# Patient Record
Sex: FEMALE | Race: WHITE | Employment: OTHER | URBAN - METROPOLITAN AREA
[De-identification: names, ages, dates, MRNs, and addresses within clinical notes are randomized per-mention and may not be internally consistent; named-entity substitution may affect disease eponyms.]

---

## 2017-03-31 ENCOUNTER — OFFICE VISIT (OUTPATIENT)
Dept: INTERNAL MEDICINE CLINIC | Age: 72
End: 2017-03-31
Payer: MEDICARE

## 2017-03-31 VITALS
BODY MASS INDEX: 27.75 KG/M2 | WEIGHT: 147 LBS | SYSTOLIC BLOOD PRESSURE: 138 MMHG | DIASTOLIC BLOOD PRESSURE: 88 MMHG | HEIGHT: 61 IN

## 2017-03-31 DIAGNOSIS — Z12.31 ENCOUNTER FOR SCREENING MAMMOGRAM FOR BREAST CANCER: ICD-10-CM

## 2017-03-31 DIAGNOSIS — I21.4 NSTEMI (NON-ST ELEVATED MYOCARDIAL INFARCTION) (HCC): ICD-10-CM

## 2017-03-31 DIAGNOSIS — M81.0 OSTEOPOROSIS: ICD-10-CM

## 2017-03-31 DIAGNOSIS — E78.2 MIXED HYPERLIPIDEMIA: ICD-10-CM

## 2017-03-31 DIAGNOSIS — Z12.11 SCREENING FOR COLON CANCER: ICD-10-CM

## 2017-03-31 DIAGNOSIS — I10 ESSENTIAL HYPERTENSION: Primary | ICD-10-CM

## 2017-03-31 DIAGNOSIS — D12.6 TUBULOVILLOUS ADENOMA OF COLON: ICD-10-CM

## 2017-03-31 PROCEDURE — 99214 OFFICE O/P EST MOD 30 MIN: CPT | Performed by: INTERNAL MEDICINE

## 2017-03-31 RX ORDER — ATORVASTATIN CALCIUM 40 MG/1
40 TABLET, FILM COATED ORAL DAILY
Qty: 90 TABLET | Refills: 0 | Status: SHIPPED | OUTPATIENT
Start: 2017-03-31 | End: 2017-03-31 | Stop reason: CLARIF

## 2017-03-31 RX ORDER — CARVEDILOL 6.25 MG/1
12.5 TABLET ORAL 2 TIMES DAILY
Qty: 180 TABLET | Refills: 3 | Status: SHIPPED | OUTPATIENT
Start: 2017-03-31 | End: 2017-05-30 | Stop reason: SDUPTHER

## 2017-03-31 RX ORDER — ATORVASTATIN CALCIUM 40 MG/1
40 TABLET, FILM COATED ORAL DAILY
Qty: 90 TABLET | Refills: 0 | Status: SHIPPED | OUTPATIENT
Start: 2017-03-31 | End: 2017-03-31 | Stop reason: SDUPTHER

## 2017-03-31 ASSESSMENT — ENCOUNTER SYMPTOMS
RHINORRHEA: 0
FACIAL SWELLING: 0
CHOKING: 0
EYE ITCHING: 0
DIARRHEA: 0
ANAL BLEEDING: 0
SORE THROAT: 0
BACK PAIN: 1
EYE REDNESS: 0
ABDOMINAL PAIN: 0
VOICE CHANGE: 0
CHEST TIGHTNESS: 0
ABDOMINAL DISTENTION: 0
COLOR CHANGE: 0
CONSTIPATION: 0
NAUSEA: 0
VOMITING: 0
SHORTNESS OF BREATH: 0
BLOOD IN STOOL: 0
PHOTOPHOBIA: 0
TROUBLE SWALLOWING: 0
COUGH: 0
RECTAL PAIN: 0
APNEA: 0
EYE DISCHARGE: 0
EYE PAIN: 0
WHEEZING: 0
STRIDOR: 0
SINUS PRESSURE: 0

## 2017-03-31 ASSESSMENT — PATIENT HEALTH QUESTIONNAIRE - PHQ9
SUM OF ALL RESPONSES TO PHQ QUESTIONS 1-9: 0
1. LITTLE INTEREST OR PLEASURE IN DOING THINGS: 0
2. FEELING DOWN, DEPRESSED OR HOPELESS: 0
SUM OF ALL RESPONSES TO PHQ9 QUESTIONS 1 & 2: 0

## 2017-04-03 RX ORDER — CARVEDILOL 12.5 MG/1
12.5 TABLET ORAL 2 TIMES DAILY
Qty: 180 TABLET | Refills: 3 | Status: SHIPPED | OUTPATIENT
Start: 2017-04-03 | End: 2018-03-22 | Stop reason: SDUPTHER

## 2017-04-05 ENCOUNTER — TELEPHONE (OUTPATIENT)
Dept: GASTROENTEROLOGY | Age: 72
End: 2017-04-05

## 2017-04-06 ENCOUNTER — TELEPHONE (OUTPATIENT)
Dept: GASTROENTEROLOGY | Age: 72
End: 2017-04-06

## 2017-04-06 DIAGNOSIS — Z86.010 HX OF COLONIC POLYPS: Primary | ICD-10-CM

## 2017-04-22 ENCOUNTER — HOSPITAL ENCOUNTER (OUTPATIENT)
Age: 72
Setting detail: OUTPATIENT SURGERY
Discharge: HOME | End: 2017-04-22
Attending: INTERNAL MEDICINE | Admitting: INTERNAL MEDICINE
Payer: MEDICARE

## 2017-04-22 VITALS
OXYGEN SATURATION: 99 % | WEIGHT: 145 LBS | RESPIRATION RATE: 16 BRPM | SYSTOLIC BLOOD PRESSURE: 113 MMHG | DIASTOLIC BLOOD PRESSURE: 62 MMHG | TEMPERATURE: 97.5 F | BODY MASS INDEX: 27.38 KG/M2 | HEART RATE: 56 BPM | HEIGHT: 61 IN

## 2017-04-22 PROCEDURE — 7100000011 HC PHASE II RECOVERY - ADDTL 15 MIN: Performed by: INTERNAL MEDICINE

## 2017-04-22 PROCEDURE — 2580000003 HC RX 258: Performed by: INTERNAL MEDICINE

## 2017-04-22 PROCEDURE — 99153 MOD SED SAME PHYS/QHP EA: CPT | Performed by: INTERNAL MEDICINE

## 2017-04-22 PROCEDURE — 7100000010 HC PHASE II RECOVERY - FIRST 15 MIN: Performed by: INTERNAL MEDICINE

## 2017-04-22 PROCEDURE — 3609010400 HC COLONOSCOPY POLYPECTOMY HOT BIOPSY: Performed by: INTERNAL MEDICINE

## 2017-04-22 PROCEDURE — 99152 MOD SED SAME PHYS/QHP 5/>YRS: CPT | Performed by: INTERNAL MEDICINE

## 2017-04-22 PROCEDURE — 88305 TISSUE EXAM BY PATHOLOGIST: CPT

## 2017-04-22 PROCEDURE — 6360000002 HC RX W HCPCS: Performed by: INTERNAL MEDICINE

## 2017-04-22 RX ORDER — FENTANYL CITRATE 50 UG/ML
INJECTION, SOLUTION INTRAMUSCULAR; INTRAVENOUS PRN
Status: DISCONTINUED | OUTPATIENT
Start: 2017-04-22 | End: 2017-04-22 | Stop reason: HOSPADM

## 2017-04-22 RX ORDER — MIDAZOLAM HYDROCHLORIDE 1 MG/ML
INJECTION INTRAMUSCULAR; INTRAVENOUS PRN
Status: DISCONTINUED | OUTPATIENT
Start: 2017-04-22 | End: 2017-04-22 | Stop reason: HOSPADM

## 2017-04-22 RX ORDER — SODIUM CHLORIDE, SODIUM LACTATE, POTASSIUM CHLORIDE, CALCIUM CHLORIDE 600; 310; 30; 20 MG/100ML; MG/100ML; MG/100ML; MG/100ML
INJECTION, SOLUTION INTRAVENOUS CONTINUOUS
Status: DISCONTINUED | OUTPATIENT
Start: 2017-04-22 | End: 2017-04-22 | Stop reason: HOSPADM

## 2017-04-22 RX ADMIN — SODIUM CHLORIDE, POTASSIUM CHLORIDE, SODIUM LACTATE AND CALCIUM CHLORIDE: 600; 310; 30; 20 INJECTION, SOLUTION INTRAVENOUS at 07:05

## 2017-04-22 ASSESSMENT — PAIN SCALES - GENERAL
PAINLEVEL_OUTOF10: 0
PAINLEVEL_OUTOF10: 0

## 2017-04-22 ASSESSMENT — PAIN - FUNCTIONAL ASSESSMENT: PAIN_FUNCTIONAL_ASSESSMENT: 0-10

## 2017-04-27 DIAGNOSIS — M81.0 OSTEOPOROSIS: ICD-10-CM

## 2017-04-27 DIAGNOSIS — Z12.31 ENCOUNTER FOR SCREENING MAMMOGRAM FOR BREAST CANCER: ICD-10-CM

## 2017-04-27 LAB — SURGICAL PATHOLOGY REPORT: NORMAL

## 2017-05-15 PROBLEM — Z86.010 HISTORY OF COLON POLYPS: Status: ACTIVE | Noted: 2017-04-22

## 2017-05-15 PROBLEM — Z86.0100 HISTORY OF COLON POLYPS: Status: ACTIVE | Noted: 2017-04-22

## 2017-05-30 ENCOUNTER — OFFICE VISIT (OUTPATIENT)
Dept: GASTROENTEROLOGY | Age: 72
End: 2017-05-30
Payer: MEDICARE

## 2017-05-30 VITALS
HEIGHT: 61 IN | OXYGEN SATURATION: 96 % | DIASTOLIC BLOOD PRESSURE: 77 MMHG | TEMPERATURE: 97.7 F | SYSTOLIC BLOOD PRESSURE: 132 MMHG | HEART RATE: 67 BPM | WEIGHT: 143.1 LBS | BODY MASS INDEX: 27.02 KG/M2

## 2017-05-30 DIAGNOSIS — Z86.010 HISTORY OF COLON POLYPS: Primary | ICD-10-CM

## 2017-05-30 DIAGNOSIS — K60.2 ANAL FISSURE: ICD-10-CM

## 2017-05-30 PROCEDURE — 99214 OFFICE O/P EST MOD 30 MIN: CPT | Performed by: INTERNAL MEDICINE

## 2017-05-30 RX ORDER — ATORVASTATIN CALCIUM 40 MG/1
20 TABLET, FILM COATED ORAL DAILY
COMMUNITY
Start: 2017-03-31 | End: 2017-07-28 | Stop reason: DRUGHIGH

## 2017-05-30 ASSESSMENT — ENCOUNTER SYMPTOMS
BACK PAIN: 1
RECTAL PAIN: 0
ABDOMINAL PAIN: 0
TROUBLE SWALLOWING: 0
CHOKING: 0
CONSTIPATION: 0
VOICE CHANGE: 0
ALLERGIC/IMMUNOLOGIC NEGATIVE: 1
ANAL BLEEDING: 0
ABDOMINAL DISTENTION: 0
SINUS PRESSURE: 0
DIARRHEA: 0
GASTROINTESTINAL NEGATIVE: 1
NAUSEA: 0
BLOOD IN STOOL: 0
FACIAL SWELLING: 0
SHORTNESS OF BREATH: 0
RHINORRHEA: 0
VOMITING: 0
SORE THROAT: 0
COUGH: 0

## 2017-06-02 ENCOUNTER — OFFICE VISIT (OUTPATIENT)
Dept: INTERNAL MEDICINE CLINIC | Age: 72
End: 2017-06-02
Payer: MEDICARE

## 2017-06-02 ENCOUNTER — TELEPHONE (OUTPATIENT)
Dept: INTERNAL MEDICINE CLINIC | Age: 72
End: 2017-06-02

## 2017-06-02 VITALS
HEIGHT: 61 IN | DIASTOLIC BLOOD PRESSURE: 80 MMHG | WEIGHT: 144 LBS | SYSTOLIC BLOOD PRESSURE: 122 MMHG | BODY MASS INDEX: 27.19 KG/M2

## 2017-06-02 DIAGNOSIS — E78.2 MIXED HYPERLIPIDEMIA: ICD-10-CM

## 2017-06-02 DIAGNOSIS — M81.0 OSTEOPOROSIS: ICD-10-CM

## 2017-06-02 DIAGNOSIS — I25.10 CORONARY ARTERY DISEASE INVOLVING NATIVE CORONARY ARTERY OF NATIVE HEART WITHOUT ANGINA PECTORIS: ICD-10-CM

## 2017-06-02 DIAGNOSIS — I10 ESSENTIAL HYPERTENSION: ICD-10-CM

## 2017-06-02 DIAGNOSIS — D12.6 TUBULOVILLOUS ADENOMA OF COLON: Primary | ICD-10-CM

## 2017-06-02 DIAGNOSIS — L40.9 PSORIASIS: ICD-10-CM

## 2017-06-02 PROCEDURE — 99214 OFFICE O/P EST MOD 30 MIN: CPT | Performed by: INTERNAL MEDICINE

## 2017-06-02 ASSESSMENT — ENCOUNTER SYMPTOMS
CHEST TIGHTNESS: 0
CHOKING: 0
ANAL BLEEDING: 0
TROUBLE SWALLOWING: 0
COLOR CHANGE: 0
EYE REDNESS: 0
EYE PAIN: 0
DIARRHEA: 0
APNEA: 0
FACIAL SWELLING: 0
BLOOD IN STOOL: 0
EYE ITCHING: 0
CONSTIPATION: 0
ABDOMINAL PAIN: 0
EYE DISCHARGE: 0
STRIDOR: 0
NAUSEA: 0
SINUS PRESSURE: 0
SORE THROAT: 0
ABDOMINAL DISTENTION: 0
VOICE CHANGE: 0
PHOTOPHOBIA: 0
RECTAL PAIN: 0
VOMITING: 0
SHORTNESS OF BREATH: 0
WHEEZING: 0
COUGH: 0
BACK PAIN: 0
RHINORRHEA: 0

## 2017-07-19 ENCOUNTER — HOSPITAL ENCOUNTER (OUTPATIENT)
Dept: CARDIAC CATH/INVASIVE PROCEDURES | Age: 72
Discharge: HOME | End: 2017-07-20
Attending: INTERNAL MEDICINE | Admitting: INTERNAL MEDICINE
Payer: MEDICARE

## 2017-07-19 LAB
GFR NON-AFRICAN AMERICAN: >60 ML/MIN
GFR SERPL CREATININE-BSD FRML MDRD: >60 ML/MIN
GFR SERPL CREATININE-BSD FRML MDRD: ABNORMAL ML/MIN/{1.73_M2}
GLUCOSE BLD-MCNC: 97 MG/DL (ref 74–100)
PLATELET # BLD: 219 K/UL (ref 140–450)
POC CHLORIDE: 98 MMOL/L (ref 98–107)
POC CREATININE: 0.47 MG/DL (ref 0.51–1.19)
POC HEMATOCRIT: 40 % (ref 36–46)
POC HEMOGLOBIN: 13.6 G/DL (ref 12–16)
POC POTASSIUM: 3.7 MMOL/L (ref 3.5–4.5)
POC SODIUM: 134 MMOL/L (ref 138–146)
TROPONIN INTERP: NORMAL
TROPONIN INTERP: NORMAL
TROPONIN T: <0.03 NG/ML
TROPONIN T: <0.03 NG/ML

## 2017-07-19 PROCEDURE — C1769 GUIDE WIRE: HCPCS

## 2017-07-19 PROCEDURE — C1725 CATH, TRANSLUMIN NON-LASER: HCPCS

## 2017-07-19 PROCEDURE — 82565 ASSAY OF CREATININE: CPT

## 2017-07-19 PROCEDURE — 36415 COLL VENOUS BLD VENIPUNCTURE: CPT

## 2017-07-19 PROCEDURE — 7100000010 HC PHASE II RECOVERY - FIRST 15 MIN

## 2017-07-19 PROCEDURE — 93455 CORONARY ART/GRFT ANGIO S&I: CPT | Performed by: INTERNAL MEDICINE

## 2017-07-19 PROCEDURE — 7100000011 HC PHASE II RECOVERY - ADDTL 15 MIN

## 2017-07-19 PROCEDURE — 85049 AUTOMATED PLATELET COUNT: CPT

## 2017-07-19 PROCEDURE — 85014 HEMATOCRIT: CPT

## 2017-07-19 PROCEDURE — 6360000002 HC RX W HCPCS

## 2017-07-19 PROCEDURE — 92928 PRQ TCAT PLMT NTRAC ST 1 LES: CPT | Performed by: INTERNAL MEDICINE

## 2017-07-19 PROCEDURE — 2580000003 HC RX 258

## 2017-07-19 PROCEDURE — C1894 INTRO/SHEATH, NON-LASER: HCPCS

## 2017-07-19 PROCEDURE — C1887 CATHETER, GUIDING: HCPCS

## 2017-07-19 PROCEDURE — 84132 ASSAY OF SERUM POTASSIUM: CPT

## 2017-07-19 PROCEDURE — 1200000000 HC SEMI PRIVATE

## 2017-07-19 PROCEDURE — 84484 ASSAY OF TROPONIN QUANT: CPT

## 2017-07-19 PROCEDURE — 6370000000 HC RX 637 (ALT 250 FOR IP)

## 2017-07-19 PROCEDURE — 2580000003 HC RX 258: Performed by: INTERNAL MEDICINE

## 2017-07-19 PROCEDURE — 2500000003 HC RX 250 WO HCPCS

## 2017-07-19 PROCEDURE — C1874 STENT, COATED/COV W/DEL SYS: HCPCS

## 2017-07-19 PROCEDURE — 82947 ASSAY GLUCOSE BLOOD QUANT: CPT

## 2017-07-19 PROCEDURE — 6370000000 HC RX 637 (ALT 250 FOR IP): Performed by: INTERNAL MEDICINE

## 2017-07-19 PROCEDURE — 82435 ASSAY OF BLOOD CHLORIDE: CPT

## 2017-07-19 PROCEDURE — 84295 ASSAY OF SERUM SODIUM: CPT

## 2017-07-19 RX ORDER — SODIUM CHLORIDE 9 MG/ML
INJECTION, SOLUTION INTRAVENOUS CONTINUOUS
Status: DISCONTINUED | OUTPATIENT
Start: 2017-07-19 | End: 2017-07-19

## 2017-07-19 RX ORDER — SODIUM CHLORIDE 0.9 % (FLUSH) 0.9 %
10 SYRINGE (ML) INJECTION EVERY 12 HOURS SCHEDULED
Status: DISCONTINUED | OUTPATIENT
Start: 2017-07-19 | End: 2017-07-20 | Stop reason: HOSPADM

## 2017-07-19 RX ORDER — HYDROCHLOROTHIAZIDE 25 MG/1
25 TABLET ORAL DAILY
Status: DISCONTINUED | OUTPATIENT
Start: 2017-07-19 | End: 2017-07-20 | Stop reason: HOSPADM

## 2017-07-19 RX ORDER — SODIUM CHLORIDE 9 MG/ML
INJECTION, SOLUTION INTRAVENOUS CONTINUOUS
Status: DISCONTINUED | OUTPATIENT
Start: 2017-07-19 | End: 2017-07-20 | Stop reason: HOSPADM

## 2017-07-19 RX ORDER — ATORVASTATIN CALCIUM 40 MG/1
40 TABLET, FILM COATED ORAL DAILY
Status: DISCONTINUED | OUTPATIENT
Start: 2017-07-19 | End: 2017-07-20 | Stop reason: HOSPADM

## 2017-07-19 RX ORDER — ACETAMINOPHEN 325 MG/1
650 TABLET ORAL EVERY 4 HOURS PRN
Status: DISCONTINUED | OUTPATIENT
Start: 2017-07-19 | End: 2017-07-20 | Stop reason: HOSPADM

## 2017-07-19 RX ORDER — MELATONIN
1000 DAILY
Status: DISCONTINUED | OUTPATIENT
Start: 2017-07-19 | End: 2017-07-20 | Stop reason: HOSPADM

## 2017-07-19 RX ORDER — LABETALOL HYDROCHLORIDE 5 MG/ML
10 INJECTION, SOLUTION INTRAVENOUS EVERY 30 MIN PRN
Status: DISCONTINUED | OUTPATIENT
Start: 2017-07-19 | End: 2017-07-20 | Stop reason: HOSPADM

## 2017-07-19 RX ORDER — SODIUM CHLORIDE 0.9 % (FLUSH) 0.9 %
10 SYRINGE (ML) INJECTION PRN
Status: DISCONTINUED | OUTPATIENT
Start: 2017-07-19 | End: 2017-07-20 | Stop reason: HOSPADM

## 2017-07-19 RX ORDER — ONDANSETRON 2 MG/ML
4 INJECTION INTRAMUSCULAR; INTRAVENOUS EVERY 6 HOURS PRN
Status: DISCONTINUED | OUTPATIENT
Start: 2017-07-19 | End: 2017-07-20 | Stop reason: HOSPADM

## 2017-07-19 RX ORDER — ASPIRIN 81 MG/1
81 TABLET ORAL DAILY
Status: DISCONTINUED | OUTPATIENT
Start: 2017-07-19 | End: 2017-07-20 | Stop reason: HOSPADM

## 2017-07-19 RX ORDER — CARVEDILOL 12.5 MG/1
12.5 TABLET ORAL 2 TIMES DAILY
Status: DISCONTINUED | OUTPATIENT
Start: 2017-07-19 | End: 2017-07-20 | Stop reason: HOSPADM

## 2017-07-19 RX ADMIN — Medication 10 ML: at 21:28

## 2017-07-19 RX ADMIN — CARVEDILOL 12.5 MG: 12.5 TABLET, FILM COATED ORAL at 23:43

## 2017-07-19 RX ADMIN — SODIUM CHLORIDE: 9 INJECTION, SOLUTION INTRAVENOUS at 12:55

## 2017-07-19 RX ADMIN — ATORVASTATIN CALCIUM 40 MG: 40 TABLET, FILM COATED ORAL at 23:43

## 2017-07-19 RX ADMIN — SODIUM CHLORIDE: 9 INJECTION, SOLUTION INTRAVENOUS at 21:29

## 2017-07-20 VITALS
OXYGEN SATURATION: 100 % | RESPIRATION RATE: 20 BRPM | HEART RATE: 67 BPM | DIASTOLIC BLOOD PRESSURE: 51 MMHG | SYSTOLIC BLOOD PRESSURE: 113 MMHG | WEIGHT: 141 LBS | TEMPERATURE: 97.9 F | HEIGHT: 61 IN | BODY MASS INDEX: 26.62 KG/M2

## 2017-07-20 PROCEDURE — 6370000000 HC RX 637 (ALT 250 FOR IP): Performed by: INTERNAL MEDICINE

## 2017-07-20 PROCEDURE — 2580000003 HC RX 258: Performed by: INTERNAL MEDICINE

## 2017-07-20 RX ORDER — NITROGLYCERIN 0.4 MG/1
0.4 TABLET SUBLINGUAL EVERY 5 MIN PRN
Qty: 25 TABLET | Refills: 3 | Status: SHIPPED | OUTPATIENT
Start: 2017-07-20 | End: 2018-07-03 | Stop reason: SDUPTHER

## 2017-07-20 RX ADMIN — HYDROCHLOROTHIAZIDE 25 MG: 25 TABLET ORAL at 08:21

## 2017-07-20 RX ADMIN — CARVEDILOL 12.5 MG: 12.5 TABLET, FILM COATED ORAL at 08:23

## 2017-07-20 RX ADMIN — Medication 10 ML: at 08:21

## 2017-07-20 RX ADMIN — TICAGRELOR 90 MG: 90 TABLET ORAL at 08:21

## 2017-07-20 RX ADMIN — ASPIRIN 81 MG: 81 TABLET, COATED ORAL at 08:21

## 2017-07-20 RX ADMIN — VITAMIN D, TAB 1000IU (100/BT) 1000 UNITS: 25 TAB at 08:21

## 2017-07-20 ASSESSMENT — PAIN SCALES - GENERAL: PAINLEVEL_OUTOF10: 0

## 2017-07-21 ENCOUNTER — HOSPITAL ENCOUNTER (OUTPATIENT)
Age: 72
Setting detail: OBSERVATION
Discharge: HOME | End: 2017-07-22
Attending: EMERGENCY MEDICINE | Admitting: INTERNAL MEDICINE
Payer: MEDICARE

## 2017-07-21 ENCOUNTER — APPOINTMENT (OUTPATIENT)
Dept: GENERAL RADIOLOGY | Age: 72
End: 2017-07-21
Payer: MEDICARE

## 2017-07-21 DIAGNOSIS — R55 SYNCOPE AND COLLAPSE: Primary | ICD-10-CM

## 2017-07-21 DIAGNOSIS — Z98.890 HISTORY OF LEFT HEART CATHETERIZATION: ICD-10-CM

## 2017-07-21 PROBLEM — I25.10 CAD (CORONARY ARTERY DISEASE): Status: ACTIVE | Noted: 2017-07-21

## 2017-07-21 PROBLEM — Z95.5 STENTED CORONARY ARTERY: Status: ACTIVE | Noted: 2017-07-21

## 2017-07-21 LAB
-: ABNORMAL
ABSOLUTE EOS #: 0.1 K/UL (ref 0–0.4)
ABSOLUTE LYMPH #: 2 K/UL (ref 1–4.8)
ABSOLUTE MONO #: 0.7 K/UL (ref 0.1–1.3)
ALBUMIN SERPL-MCNC: 4.3 G/DL (ref 3.5–5.2)
ALBUMIN/GLOBULIN RATIO: NORMAL (ref 1–2.5)
ALP BLD-CCNC: 81 U/L (ref 35–104)
ALT SERPL-CCNC: 14 U/L (ref 5–33)
AMORPHOUS: ABNORMAL
ANION GAP SERPL CALCULATED.3IONS-SCNC: 16 MMOL/L (ref 9–17)
ANION GAP SERPL CALCULATED.3IONS-SCNC: 18 MMOL/L (ref 9–17)
AST SERPL-CCNC: 18 U/L
BACTERIA: ABNORMAL
BASOPHILS # BLD: 0 %
BASOPHILS ABSOLUTE: 0 K/UL (ref 0–0.2)
BILIRUB SERPL-MCNC: 0.65 MG/DL (ref 0.3–1.2)
BILIRUBIN DIRECT: 0.13 MG/DL
BILIRUBIN URINE: NEGATIVE
BILIRUBIN, INDIRECT: 0.52 MG/DL (ref 0–1)
BUN BLDV-MCNC: 12 MG/DL (ref 8–23)
BUN BLDV-MCNC: 14 MG/DL (ref 8–23)
BUN/CREAT BLD: ABNORMAL (ref 9–20)
BUN/CREAT BLD: ABNORMAL (ref 9–20)
CALCIUM SERPL-MCNC: 9.5 MG/DL (ref 8.6–10.4)
CALCIUM SERPL-MCNC: 9.6 MG/DL (ref 8.6–10.4)
CASTS UA: ABNORMAL /LPF
CHLORIDE BLD-SCNC: 94 MMOL/L (ref 98–107)
CHLORIDE BLD-SCNC: 97 MMOL/L (ref 98–107)
CO2: 20 MMOL/L (ref 20–31)
CO2: 22 MMOL/L (ref 20–31)
COLOR: YELLOW
COMMENT UA: ABNORMAL
CREAT SERPL-MCNC: 0.55 MG/DL (ref 0.5–0.9)
CREAT SERPL-MCNC: 0.59 MG/DL (ref 0.5–0.9)
CRYSTALS, UA: ABNORMAL /HPF
DIFFERENTIAL TYPE: NORMAL
EKG ATRIAL RATE: 65 BPM
EKG P AXIS: 59 DEGREES
EKG P-R INTERVAL: 226 MS
EKG Q-T INTERVAL: 390 MS
EKG QRS DURATION: 94 MS
EKG QTC CALCULATION (BAZETT): 405 MS
EKG R AXIS: -15 DEGREES
EKG T AXIS: 100 DEGREES
EKG VENTRICULAR RATE: 65 BPM
EOSINOPHILS RELATIVE PERCENT: 1 %
EPITHELIAL CELLS UA: ABNORMAL /HPF
GFR AFRICAN AMERICAN: >60 ML/MIN
GFR AFRICAN AMERICAN: >60 ML/MIN
GFR NON-AFRICAN AMERICAN: >60 ML/MIN
GFR NON-AFRICAN AMERICAN: >60 ML/MIN
GFR SERPL CREATININE-BSD FRML MDRD: ABNORMAL ML/MIN/{1.73_M2}
GLOBULIN: NORMAL G/DL (ref 1.5–3.8)
GLUCOSE BLD-MCNC: 108 MG/DL (ref 70–99)
GLUCOSE BLD-MCNC: 114 MG/DL (ref 70–99)
GLUCOSE URINE: NEGATIVE
HCT VFR BLD CALC: 37.3 % (ref 36–46)
HCT VFR BLD CALC: 38 % (ref 36–46)
HCT VFR BLD CALC: 38.6 % (ref 36–46)
HCT VFR BLD CALC: 40.2 % (ref 36–46)
HEMOGLOBIN: 12.8 G/DL (ref 12–16)
HEMOGLOBIN: 12.9 G/DL (ref 12–16)
HEMOGLOBIN: 13.2 G/DL (ref 12–16)
HEMOGLOBIN: 13.7 G/DL (ref 12–16)
KETONES, URINE: NEGATIVE
LEUKOCYTE ESTERASE, URINE: ABNORMAL
LV EF: 55 %
LVEF MODALITY: NORMAL
LYMPHOCYTES # BLD: 19 %
MAGNESIUM: 2 MG/DL (ref 1.6–2.6)
MCH RBC QN AUTO: 28.4 PG (ref 26–34)
MCH RBC QN AUTO: 28.4 PG (ref 26–34)
MCHC RBC AUTO-ENTMCNC: 34.1 G/DL (ref 31–37)
MCHC RBC AUTO-ENTMCNC: 34.1 G/DL (ref 31–37)
MCV RBC AUTO: 83.2 FL (ref 80–100)
MCV RBC AUTO: 83.4 FL (ref 80–100)
MONOCYTES # BLD: 7 %
MUCUS: ABNORMAL
MYOGLOBIN: 30 NG/ML (ref 25–58)
MYOGLOBIN: 34 NG/ML (ref 25–58)
NITRITE, URINE: NEGATIVE
OTHER OBSERVATIONS UA: ABNORMAL
PDW BLD-RTO: 13.3 % (ref 11.5–14.9)
PDW BLD-RTO: 13.6 % (ref 11.5–14.9)
PH UA: 6.5 (ref 5–8)
PHOSPHORUS: 4.4 MG/DL (ref 2.6–4.5)
PLATELET # BLD: 201 K/UL (ref 150–450)
PLATELET # BLD: 213 K/UL (ref 150–450)
PLATELET ESTIMATE: NORMAL
PMV BLD AUTO: 7.3 FL (ref 6–12)
PMV BLD AUTO: 7.7 FL (ref 6–12)
POTASSIUM SERPL-SCNC: 4.3 MMOL/L (ref 3.7–5.3)
POTASSIUM SERPL-SCNC: 4.5 MMOL/L (ref 3.7–5.3)
PROTEIN UA: NEGATIVE
RBC # BLD: 4.64 M/UL (ref 4–5.2)
RBC # BLD: 4.83 M/UL (ref 4–5.2)
RBC # BLD: NORMAL 10*6/UL
RBC UA: ABNORMAL /HPF
RENAL EPITHELIAL, UA: ABNORMAL /HPF
SEG NEUTROPHILS: 73 %
SEGMENTED NEUTROPHILS ABSOLUTE COUNT: 7.6 K/UL (ref 1.3–9.1)
SODIUM BLD-SCNC: 132 MMOL/L (ref 135–144)
SODIUM BLD-SCNC: 135 MMOL/L (ref 135–144)
SPECIFIC GRAVITY UA: 1.01 (ref 1–1.03)
TOTAL PROTEIN: 7 G/DL (ref 6.4–8.3)
TRICHOMONAS: ABNORMAL
TROPONIN INTERP: NORMAL
TROPONIN INTERP: NORMAL
TROPONIN T: <0.03 NG/ML
TROPONIN T: <0.03 NG/ML
TSH SERPL DL<=0.05 MIU/L-ACNC: 1.14 MIU/L (ref 0.3–5)
TURBIDITY: CLEAR
URINE HGB: NEGATIVE
UROBILINOGEN, URINE: NORMAL
WBC # BLD: 10.4 K/UL (ref 3.5–11)
WBC # BLD: 8.5 K/UL (ref 3.5–11)
WBC # BLD: NORMAL 10*3/UL
WBC UA: ABNORMAL /HPF
YEAST: ABNORMAL

## 2017-07-21 PROCEDURE — G0378 HOSPITAL OBSERVATION PER HR: HCPCS

## 2017-07-21 PROCEDURE — 80076 HEPATIC FUNCTION PANEL: CPT

## 2017-07-21 PROCEDURE — 81001 URINALYSIS AUTO W/SCOPE: CPT

## 2017-07-21 PROCEDURE — 85014 HEMATOCRIT: CPT

## 2017-07-21 PROCEDURE — 83735 ASSAY OF MAGNESIUM: CPT

## 2017-07-21 PROCEDURE — 84100 ASSAY OF PHOSPHORUS: CPT

## 2017-07-21 PROCEDURE — 84443 ASSAY THYROID STIM HORMONE: CPT

## 2017-07-21 PROCEDURE — 93306 TTE W/DOPPLER COMPLETE: CPT

## 2017-07-21 PROCEDURE — 99220 PR INITIAL OBSERVATION CARE/DAY 70 MINUTES: CPT | Performed by: INTERNAL MEDICINE

## 2017-07-21 PROCEDURE — 2580000003 HC RX 258: Performed by: NURSE PRACTITIONER

## 2017-07-21 PROCEDURE — 85027 COMPLETE CBC AUTOMATED: CPT

## 2017-07-21 PROCEDURE — 36415 COLL VENOUS BLD VENIPUNCTURE: CPT

## 2017-07-21 PROCEDURE — 99285 EMERGENCY DEPT VISIT HI MDM: CPT

## 2017-07-21 PROCEDURE — 93926 LOWER EXTREMITY STUDY: CPT

## 2017-07-21 PROCEDURE — 84484 ASSAY OF TROPONIN QUANT: CPT

## 2017-07-21 PROCEDURE — 80048 BASIC METABOLIC PNL TOTAL CA: CPT

## 2017-07-21 PROCEDURE — 85025 COMPLETE CBC W/AUTO DIFF WBC: CPT

## 2017-07-21 PROCEDURE — 85018 HEMOGLOBIN: CPT

## 2017-07-21 PROCEDURE — 71020 XR CHEST STANDARD TWO VW: CPT

## 2017-07-21 PROCEDURE — 87086 URINE CULTURE/COLONY COUNT: CPT

## 2017-07-21 PROCEDURE — 83874 ASSAY OF MYOGLOBIN: CPT

## 2017-07-21 PROCEDURE — 93005 ELECTROCARDIOGRAM TRACING: CPT

## 2017-07-21 RX ORDER — ASCORBIC ACID 500 MG
500 TABLET ORAL DAILY
Status: DISCONTINUED | OUTPATIENT
Start: 2017-07-21 | End: 2017-07-22 | Stop reason: HOSPADM

## 2017-07-21 RX ORDER — CARVEDILOL 12.5 MG/1
12.5 TABLET ORAL 2 TIMES DAILY
Status: DISCONTINUED | OUTPATIENT
Start: 2017-07-21 | End: 2017-07-22 | Stop reason: HOSPADM

## 2017-07-21 RX ORDER — CALCIUM CARBONATE/VITAMIN D3 600 MG-10
1 TABLET ORAL DAILY
Status: DISCONTINUED | OUTPATIENT
Start: 2017-07-21 | End: 2017-07-22 | Stop reason: HOSPADM

## 2017-07-21 RX ORDER — ONDANSETRON 2 MG/ML
4 INJECTION INTRAMUSCULAR; INTRAVENOUS EVERY 6 HOURS PRN
Status: DISCONTINUED | OUTPATIENT
Start: 2017-07-21 | End: 2017-07-22 | Stop reason: HOSPADM

## 2017-07-21 RX ORDER — SODIUM CHLORIDE 0.9 % (FLUSH) 0.9 %
10 SYRINGE (ML) INJECTION PRN
Status: DISCONTINUED | OUTPATIENT
Start: 2017-07-21 | End: 2017-07-22 | Stop reason: HOSPADM

## 2017-07-21 RX ORDER — ACETAMINOPHEN 325 MG/1
650 TABLET ORAL EVERY 4 HOURS PRN
Status: DISCONTINUED | OUTPATIENT
Start: 2017-07-21 | End: 2017-07-22 | Stop reason: HOSPADM

## 2017-07-21 RX ORDER — ATORVASTATIN CALCIUM 40 MG/1
40 TABLET, FILM COATED ORAL DAILY
Status: DISCONTINUED | OUTPATIENT
Start: 2017-07-21 | End: 2017-07-22 | Stop reason: HOSPADM

## 2017-07-21 RX ORDER — POTASSIUM CHLORIDE 7.45 MG/ML
10 INJECTION INTRAVENOUS PRN
Status: DISCONTINUED | OUTPATIENT
Start: 2017-07-21 | End: 2017-07-22 | Stop reason: HOSPADM

## 2017-07-21 RX ORDER — IBUPROFEN 200 MG
CAPSULE ORAL DAILY
Status: DISCONTINUED | OUTPATIENT
Start: 2017-07-21 | End: 2017-07-21 | Stop reason: CLARIF

## 2017-07-21 RX ORDER — POTASSIUM CHLORIDE 20 MEQ/1
40 TABLET, EXTENDED RELEASE ORAL PRN
Status: DISCONTINUED | OUTPATIENT
Start: 2017-07-21 | End: 2017-07-22 | Stop reason: HOSPADM

## 2017-07-21 RX ORDER — BISACODYL 10 MG
10 SUPPOSITORY, RECTAL RECTAL DAILY PRN
Status: DISCONTINUED | OUTPATIENT
Start: 2017-07-21 | End: 2017-07-22 | Stop reason: HOSPADM

## 2017-07-21 RX ORDER — M-VIT,TX,IRON,MINS/CALC/FOLIC 27MG-0.4MG
1 TABLET ORAL DAILY
Status: DISCONTINUED | OUTPATIENT
Start: 2017-07-21 | End: 2017-07-22 | Stop reason: HOSPADM

## 2017-07-21 RX ORDER — SODIUM CHLORIDE 9 MG/ML
INJECTION, SOLUTION INTRAVENOUS CONTINUOUS
Status: DISCONTINUED | OUTPATIENT
Start: 2017-07-21 | End: 2017-07-21

## 2017-07-21 RX ORDER — POTASSIUM CHLORIDE 20MEQ/15ML
40 LIQUID (ML) ORAL PRN
Status: DISCONTINUED | OUTPATIENT
Start: 2017-07-21 | End: 2017-07-22 | Stop reason: HOSPADM

## 2017-07-21 RX ORDER — MELATONIN
1000 DAILY
Status: DISCONTINUED | OUTPATIENT
Start: 2017-07-21 | End: 2017-07-22 | Stop reason: HOSPADM

## 2017-07-21 RX ORDER — ASPIRIN 81 MG/1
81 TABLET ORAL DAILY
Status: DISCONTINUED | OUTPATIENT
Start: 2017-07-21 | End: 2017-07-22 | Stop reason: HOSPADM

## 2017-07-21 RX ORDER — SODIUM CHLORIDE 0.9 % (FLUSH) 0.9 %
10 SYRINGE (ML) INJECTION EVERY 12 HOURS SCHEDULED
Status: DISCONTINUED | OUTPATIENT
Start: 2017-07-21 | End: 2017-07-22 | Stop reason: HOSPADM

## 2017-07-21 RX ORDER — NITROGLYCERIN 0.4 MG/1
0.4 TABLET SUBLINGUAL EVERY 5 MIN PRN
Status: DISCONTINUED | OUTPATIENT
Start: 2017-07-21 | End: 2017-07-22 | Stop reason: HOSPADM

## 2017-07-21 RX ADMIN — Medication 10 ML: at 21:01

## 2017-07-21 RX ADMIN — ATORVASTATIN CALCIUM 40 MG: 40 TABLET, FILM COATED ORAL at 21:00

## 2017-07-21 RX ADMIN — Medication 500 MG: at 09:34

## 2017-07-21 RX ADMIN — SODIUM CHLORIDE: 9 INJECTION, SOLUTION INTRAVENOUS at 07:52

## 2017-07-21 RX ADMIN — CARVEDILOL 12.5 MG: 12.5 TABLET ORAL at 21:00

## 2017-07-21 RX ADMIN — Medication 1 TABLET: at 09:32

## 2017-07-21 RX ADMIN — MELATONIN 1000 UNITS: at 09:33

## 2017-07-21 RX ADMIN — CARVEDILOL 12.5 MG: 12.5 TABLET ORAL at 09:33

## 2017-07-21 RX ADMIN — Medication 1 TABLET: at 09:31

## 2017-07-21 RX ADMIN — Medication 10 ML: at 09:31

## 2017-07-21 RX ADMIN — ASPIRIN 81 MG: 81 TABLET ORAL at 09:34

## 2017-07-21 ASSESSMENT — ENCOUNTER SYMPTOMS
BLURRED VISION: 0
NAUSEA: 0
ABDOMINAL PAIN: 0
DOUBLE VISION: 0
SORE THROAT: 0
BACK PAIN: 0
COUGH: 0
VOMITING: 0
CONSTIPATION: 0
ORTHOPNEA: 0
DIARRHEA: 0
SPUTUM PRODUCTION: 0
EYE PAIN: 0
WHEEZING: 0
SHORTNESS OF BREATH: 0

## 2017-07-22 VITALS
TEMPERATURE: 97.3 F | SYSTOLIC BLOOD PRESSURE: 117 MMHG | WEIGHT: 140 LBS | RESPIRATION RATE: 18 BRPM | DIASTOLIC BLOOD PRESSURE: 67 MMHG | HEIGHT: 61 IN | HEART RATE: 82 BPM | OXYGEN SATURATION: 92 % | BODY MASS INDEX: 26.43 KG/M2

## 2017-07-22 LAB
ANION GAP SERPL CALCULATED.3IONS-SCNC: 13 MMOL/L (ref 9–17)
BUN BLDV-MCNC: 12 MG/DL (ref 8–23)
BUN/CREAT BLD: NORMAL (ref 9–20)
CALCIUM SERPL-MCNC: 9.4 MG/DL (ref 8.6–10.4)
CHLORIDE BLD-SCNC: 100 MMOL/L (ref 98–107)
CO2: 24 MMOL/L (ref 20–31)
CREAT SERPL-MCNC: 0.64 MG/DL (ref 0.5–0.9)
CULTURE: NORMAL
CULTURE: NORMAL
GFR AFRICAN AMERICAN: >60 ML/MIN
GFR NON-AFRICAN AMERICAN: >60 ML/MIN
GFR SERPL CREATININE-BSD FRML MDRD: NORMAL ML/MIN/{1.73_M2}
GFR SERPL CREATININE-BSD FRML MDRD: NORMAL ML/MIN/{1.73_M2}
GLUCOSE BLD-MCNC: 96 MG/DL (ref 70–99)
HCT VFR BLD CALC: 38 % (ref 36–46)
HCT VFR BLD CALC: 38.2 % (ref 36–46)
HEMOGLOBIN: 13.1 G/DL (ref 12–16)
HEMOGLOBIN: 13.1 G/DL (ref 12–16)
Lab: NORMAL
MCH RBC QN AUTO: 29.1 PG (ref 26–34)
MCHC RBC AUTO-ENTMCNC: 34.5 G/DL (ref 31–37)
MCV RBC AUTO: 84.5 FL (ref 80–100)
PDW BLD-RTO: 13.5 % (ref 11.5–14.9)
PLATELET # BLD: 194 K/UL (ref 150–450)
PMV BLD AUTO: 7.8 FL (ref 6–12)
POTASSIUM SERPL-SCNC: 4.6 MMOL/L (ref 3.7–5.3)
RBC # BLD: 4.5 M/UL (ref 4–5.2)
SODIUM BLD-SCNC: 137 MMOL/L (ref 135–144)
SPECIMEN DESCRIPTION: NORMAL
SPECIMEN DESCRIPTION: NORMAL
STATUS: NORMAL
WBC # BLD: 7.5 K/UL (ref 3.5–11)

## 2017-07-22 PROCEDURE — 80048 BASIC METABOLIC PNL TOTAL CA: CPT

## 2017-07-22 PROCEDURE — 36415 COLL VENOUS BLD VENIPUNCTURE: CPT

## 2017-07-22 PROCEDURE — 99217 PR OBSERVATION CARE DISCHARGE MANAGEMENT: CPT | Performed by: INTERNAL MEDICINE

## 2017-07-22 PROCEDURE — 85027 COMPLETE CBC AUTOMATED: CPT

## 2017-07-22 PROCEDURE — 2580000003 HC RX 258: Performed by: NURSE PRACTITIONER

## 2017-07-22 PROCEDURE — G0378 HOSPITAL OBSERVATION PER HR: HCPCS

## 2017-07-22 RX ADMIN — Medication 1 TABLET: at 09:32

## 2017-07-22 RX ADMIN — CARVEDILOL 12.5 MG: 12.5 TABLET ORAL at 09:32

## 2017-07-22 RX ADMIN — Medication 10 ML: at 09:39

## 2017-07-22 RX ADMIN — Medication 500 MG: at 09:32

## 2017-07-22 RX ADMIN — ASPIRIN 81 MG: 81 TABLET ORAL at 09:31

## 2017-07-22 RX ADMIN — MELATONIN 1000 UNITS: at 09:32

## 2017-07-22 ASSESSMENT — ENCOUNTER SYMPTOMS
BACK PAIN: 0
SHORTNESS OF BREATH: 0
VOMITING: 0
SORE THROAT: 0
WHEEZING: 0
SPUTUM PRODUCTION: 0
ABDOMINAL PAIN: 0
DIARRHEA: 0
ORTHOPNEA: 0
COUGH: 0
DOUBLE VISION: 0
BLURRED VISION: 0
NAUSEA: 0
CONSTIPATION: 0

## 2017-07-28 ENCOUNTER — OFFICE VISIT (OUTPATIENT)
Dept: INTERNAL MEDICINE CLINIC | Age: 72
End: 2017-07-28
Payer: MEDICARE

## 2017-07-28 VITALS
SYSTOLIC BLOOD PRESSURE: 120 MMHG | WEIGHT: 140 LBS | BODY MASS INDEX: 26.43 KG/M2 | HEIGHT: 61 IN | DIASTOLIC BLOOD PRESSURE: 68 MMHG

## 2017-07-28 DIAGNOSIS — I21.4 NSTEMI (NON-ST ELEVATED MYOCARDIAL INFARCTION) (HCC): Primary | ICD-10-CM

## 2017-07-28 DIAGNOSIS — R55 SYNCOPE AND COLLAPSE: ICD-10-CM

## 2017-07-28 DIAGNOSIS — I10 ESSENTIAL HYPERTENSION: ICD-10-CM

## 2017-07-28 DIAGNOSIS — E78.2 MIXED HYPERLIPIDEMIA: ICD-10-CM

## 2017-07-28 PROCEDURE — 99214 OFFICE O/P EST MOD 30 MIN: CPT | Performed by: INTERNAL MEDICINE

## 2017-07-28 RX ORDER — ATORVASTATIN CALCIUM 40 MG/1
20 TABLET, FILM COATED ORAL DAILY
Qty: 30 TABLET | Status: SHIPPED | COMMUNITY
Start: 2017-07-28 | End: 2018-01-15 | Stop reason: SDUPTHER

## 2017-07-28 ASSESSMENT — ENCOUNTER SYMPTOMS
SORE THROAT: 0
COUGH: 0
APNEA: 0
FACIAL SWELLING: 0
NAUSEA: 0
ABDOMINAL DISTENTION: 0
EYE PAIN: 0
RECTAL PAIN: 0
WHEEZING: 0
RHINORRHEA: 0
DIARRHEA: 0
EYE ITCHING: 0
SHORTNESS OF BREATH: 0
CHEST TIGHTNESS: 0
ANAL BLEEDING: 0
CONSTIPATION: 0
ABDOMINAL PAIN: 0
VOICE CHANGE: 0
CHOKING: 0
COLOR CHANGE: 0
BLOOD IN STOOL: 0
TROUBLE SWALLOWING: 0
STRIDOR: 0
BACK PAIN: 1
PHOTOPHOBIA: 0
EYE DISCHARGE: 0
VOMITING: 0
EYE REDNESS: 0
SINUS PRESSURE: 0

## 2017-09-15 ENCOUNTER — OFFICE VISIT (OUTPATIENT)
Dept: INTERNAL MEDICINE CLINIC | Age: 72
End: 2017-09-15
Payer: MEDICARE

## 2017-09-15 VITALS
WEIGHT: 141 LBS | DIASTOLIC BLOOD PRESSURE: 78 MMHG | HEIGHT: 61 IN | SYSTOLIC BLOOD PRESSURE: 120 MMHG | BODY MASS INDEX: 26.62 KG/M2

## 2017-09-15 DIAGNOSIS — E78.2 MIXED HYPERLIPIDEMIA: ICD-10-CM

## 2017-09-15 DIAGNOSIS — I10 ESSENTIAL HYPERTENSION: ICD-10-CM

## 2017-09-15 DIAGNOSIS — I25.10 CORONARY ARTERY DISEASE INVOLVING NATIVE CORONARY ARTERY OF NATIVE HEART WITHOUT ANGINA PECTORIS: Primary | ICD-10-CM

## 2017-09-15 PROCEDURE — 99213 OFFICE O/P EST LOW 20 MIN: CPT | Performed by: INTERNAL MEDICINE

## 2017-09-15 ASSESSMENT — ENCOUNTER SYMPTOMS
CHEST TIGHTNESS: 0
SINUS PRESSURE: 0
FACIAL SWELLING: 0
DIARRHEA: 0
EYE ITCHING: 0
CHOKING: 0
BLOOD IN STOOL: 0
STRIDOR: 0
RHINORRHEA: 0
EYE PAIN: 0
VOICE CHANGE: 0
BACK PAIN: 0
APNEA: 0
ANAL BLEEDING: 0
SORE THROAT: 0
RECTAL PAIN: 0
EYE REDNESS: 0
VOMITING: 0
TROUBLE SWALLOWING: 0
SHORTNESS OF BREATH: 0
ABDOMINAL DISTENTION: 0
CONSTIPATION: 0
PHOTOPHOBIA: 0
COLOR CHANGE: 0
EYE DISCHARGE: 0
COUGH: 0
WHEEZING: 0
NAUSEA: 0
ABDOMINAL PAIN: 0

## 2017-10-17 ENCOUNTER — HOSPITAL ENCOUNTER (OUTPATIENT)
Age: 72
Setting detail: SPECIMEN
Discharge: HOME | End: 2017-10-17
Payer: MEDICARE

## 2017-10-17 DIAGNOSIS — E78.2 MIXED HYPERLIPIDEMIA: ICD-10-CM

## 2017-10-17 LAB
CHOLESTEROL/HDL RATIO: 5.3
CHOLESTEROL: 208 MG/DL
HDLC SERPL-MCNC: 39 MG/DL
LDL CHOLESTEROL DIRECT: 84 MG/DL
LDL CHOLESTEROL: ABNORMAL MG/DL (ref 0–130)
TRIGL SERPL-MCNC: 516 MG/DL
VLDLC SERPL CALC-MCNC: ABNORMAL MG/DL (ref 1–30)

## 2017-11-13 ENCOUNTER — HOSPITAL ENCOUNTER (OUTPATIENT)
Dept: GENERAL RADIOLOGY | Facility: CLINIC | Age: 72
Discharge: HOME | End: 2017-11-13
Payer: MEDICARE

## 2017-11-13 ENCOUNTER — HOSPITAL ENCOUNTER (OUTPATIENT)
Facility: CLINIC | Age: 72
Discharge: HOME | End: 2017-11-13
Payer: MEDICARE

## 2017-11-13 ENCOUNTER — OFFICE VISIT (OUTPATIENT)
Dept: INTERNAL MEDICINE CLINIC | Age: 72
End: 2017-11-13
Payer: MEDICARE

## 2017-11-13 VITALS
SYSTOLIC BLOOD PRESSURE: 130 MMHG | BODY MASS INDEX: 26.62 KG/M2 | WEIGHT: 141 LBS | HEIGHT: 61 IN | DIASTOLIC BLOOD PRESSURE: 90 MMHG

## 2017-11-13 DIAGNOSIS — G89.29 CHRONIC BILATERAL THORACIC BACK PAIN: ICD-10-CM

## 2017-11-13 DIAGNOSIS — I25.10 CORONARY ARTERY DISEASE INVOLVING NATIVE CORONARY ARTERY OF NATIVE HEART WITHOUT ANGINA PECTORIS: ICD-10-CM

## 2017-11-13 DIAGNOSIS — M54.6 CHRONIC BILATERAL THORACIC BACK PAIN: Primary | ICD-10-CM

## 2017-11-13 DIAGNOSIS — I10 ESSENTIAL HYPERTENSION: ICD-10-CM

## 2017-11-13 DIAGNOSIS — M54.6 CHRONIC BILATERAL THORACIC BACK PAIN: ICD-10-CM

## 2017-11-13 DIAGNOSIS — G89.29 CHRONIC BILATERAL THORACIC BACK PAIN: Primary | ICD-10-CM

## 2017-11-13 PROCEDURE — 72072 X-RAY EXAM THORAC SPINE 3VWS: CPT

## 2017-11-13 PROCEDURE — 99214 OFFICE O/P EST MOD 30 MIN: CPT | Performed by: INTERNAL MEDICINE

## 2017-11-13 PROCEDURE — 71020 XR CHEST STANDARD TWO VW: CPT

## 2017-11-13 RX ORDER — MELOXICAM 15 MG/1
15 TABLET ORAL DAILY
Qty: 30 TABLET | Refills: 3 | Status: SHIPPED | OUTPATIENT
Start: 2017-11-13 | End: 2018-03-27

## 2017-11-13 ASSESSMENT — ENCOUNTER SYMPTOMS
EYE ITCHING: 0
RHINORRHEA: 0
NAUSEA: 0
RECTAL PAIN: 0
EYE DISCHARGE: 0
DIARRHEA: 0
APNEA: 0
SHORTNESS OF BREATH: 0
ANAL BLEEDING: 0
WHEEZING: 0
STRIDOR: 0
COLOR CHANGE: 0
PHOTOPHOBIA: 0
EYE REDNESS: 0
VOMITING: 0
CONSTIPATION: 0
ABDOMINAL PAIN: 0
SINUS PRESSURE: 0
CHOKING: 0
TROUBLE SWALLOWING: 0
EYE PAIN: 0
ABDOMINAL DISTENTION: 0
VOICE CHANGE: 0
BLOOD IN STOOL: 0
CHEST TIGHTNESS: 0
BACK PAIN: 1
COUGH: 0
FACIAL SWELLING: 0
SORE THROAT: 0

## 2017-11-13 NOTE — PROGRESS NOTES
Subjective: Maritza Hinojosa is a 74 y.o. female who presents today for follow up and management of her chronic medical conditions as noted below. HPI:    Maritza Hinojosa is c/o of   Chief Complaint   Patient presents with    Back Pain     mid back pain that is moving around -started a few weeks ago under her left breast     Shortness of Breath     comes and goes    . Lt back    tender   no worsening with inspiration         Past Medical History:   Diagnosis Date    Diverticulosis of colon     Full dentures     History of colon polyps 04/22/2017    Hyperlipidemia     Hypertension     Osteoarthritis     Osteoarthritis of hand     Osteoarthritis of lower leg, localized     Osteoporosis     Psoriasis     ON TOP & BACK OF  HEAD    Sinus congestion     IN AM    Tubulovillous adenoma of colon     Wears glasses        Past Surgical History:   Procedure Laterality Date    COLONOSCOPY  6/14/2013    diverticulosis, tubulovillous adenoma, questionable prominent mucosa anorectal area, focus of superficial denudation consistent with focal active colitis    COLONOSCOPY  10/25/2013    residual polyp lower right colon, another polyp in the cecum, biopsy benign    COLONOSCOPY  04/22/2017    Total colonoscopy and biopsy and cauterization of polyps, pathology-sessile serrated adenoma x3    CORONARY ARTERY BYPASS GRAFT  12/22/15    OP CABG X 5- Dr Nathan Finch Bilateral     knee, LT. KNEE X 2     KNEE ARTHROPLASTY Right 12/8/14    FL COLSC FLX W/REMOVAL LESION BY HOT BX FORCEPS N/A 4/22/2017    COLONOSCOPY POLYPECTOMY HOT BIOPSY performed by Errol Bumpers, MD at [address] [place] [address] ([facility])       Family History   Problem Relation Age of Onset    Cancer Mother      lymphoma    Prostate Cancer Brother     Breast Cancer Sister        Social History     Social History    Marital status:       Spouse name: N/A    Number of children: N/A deviation present. No thyromegaly present. Cardiovascular: Normal rate, regular rhythm, normal heart sounds and intact distal pulses. Exam reveals no gallop and no friction rub. No murmur heard. Pulmonary/Chest: Effort normal and breath sounds normal. No respiratory distress. She has no wheezes. She has no rales. She exhibits no tenderness. Abdominal: Soft. Bowel sounds are normal. She exhibits no distension and no mass. There is no tenderness. There is no rebound and no guarding. Genitourinary: Rectal exam shows guaiac negative stool. No vaginal discharge found. Musculoskeletal: She exhibits tenderness. She exhibits no edema. Thoracic back: She exhibits tenderness. Lymphadenopathy:     She has no cervical adenopathy. Neurological: She is alert and oriented to person, place, and time. She has normal reflexes. No cranial nerve deficit. She exhibits normal muscle tone. Coordination normal.   Skin: Skin is warm and dry. No rash noted. She is not diaphoretic. No erythema. No pallor. Psychiatric: She has a normal mood and affect. Her behavior is normal. Judgment and thought content normal.         Results for orders placed or performed during the hospital encounter of 10/17/17   Lipid Panel   Result Value Ref Range    Cholesterol 208 (H) <200 mg/dL    HDL 39 (L) >40 mg/dL    LDL Cholesterol      0 - 130 mg/dL    Chol/HDL Ratio 5.3 (H) <5    Triglycerides 516 (H) <150 mg/dL    VLDL NOT REPORTED 1 - 30 mg/dL   LDL Cholesterol, Direct   Result Value Ref Range    LDL Direct 84 <100 mg/dL     No results found. Assessment:     1. Chronic bilateral thoracic back pain  XR CHEST STANDARD (2 VW)  Musculoskeletal  Check cxr  mobic        XR Spine Thoracic 3 VW   2. Essential hypertension   elevated from pain will follow cont same   3.  Coronary artery disease involving native coronary artery of native heart without angina pectoris   no angina      ddylipemia tg 500 t chol at goal    Plan:     Orders Placed This Encounter   Procedures    XR CHEST STANDARD (2 VW)     Standing Status:   Future     Standing Expiration Date:   11/13/2018     Order Specific Question:   Reason for exam:     Answer:   chest pain    XR Spine Thoracic 3 VW     Standing Status:   Future     Standing Expiration Date:   11/13/2018     Order Specific Question:   Reason for exam:     Answer:   pain       Orders Placed This Encounter   Medications    meloxicam (MOBIC) 15 MG tablet     Sig: Take 1 tablet by mouth daily     Dispense:  30 tablet     Refill:  3             Dulce received counseling on the following healthy behaviors: medication adherence  Reviewed prior labs and health maintenance. Continue current medications, diet and exercise. Discussed use, benefit, and side effects of prescribed medications. Barriers to medication compliance addressed. Patient given educational materials - see patient instructions. All patient questions answered. Patient voiced understanding. 1.  Patient given educational materials - see patient instructions  2. Discussed use, benefit, and side effects of prescribed medications. Barriers to medication compliance addressed. All patient questions answered. Pt voiced understanding. 3.  Reviewed prior labs and health maintenance  4. Continue current medications, diet and exercise.   5.  4 weeks

## 2017-11-13 NOTE — PROGRESS NOTES
Chronic Disease Visit Information    BP Readings from Last 3 Encounters:   09/15/17 120/78   07/28/17 120/68   07/22/17 117/67          Hemoglobin A1C (%)   Date Value   12/19/2015 5.7     LDL Cholesterol (mg/dL)   Date Value   10/17/2017          HDL (mg/dL)   Date Value   10/17/2017 39 (L)     BUN (mg/dL)   Date Value   07/22/2017 12     CREATININE (mg/dL)   Date Value   07/22/2017 0.64     Glucose (mg/dL)   Date Value   07/22/2017 96   03/06/2012 95            Have you changed or started any medications since your last visit including any over-the-counter medicines, vitamins, or herbal medicines? no   Are you having any side effects from any of your medications? -  no  Have you stopped taking any of your medications? Is so, why? -  no    Have you seen any other physician or provider since your last visit? No  Have you had any other diagnostic tests since your last visit? No  Have you been seen in the emergency room and/or had an admission to a hospital since we last saw you? No  Have you had your annual diabetic retinal (eye) exam? No  Have you had your routine dental cleaning in the past 6 months? no    Have you activated your [employer] account? If not, what are your barriers?  No:      Patient Care Team:  Fely Allen MD as PCP - Ricardo Miller MD as PCP - S Attributed Provider  Margarita Parham MD (Gastroenterology)  Dustin Hernandez MD as Referring Physician (Cardiology)  Jerrod Flynn MD as Surgeon (Cardiothoracic Surgery)         Medical History Review  Past Medical, Family, and Social History reviewed and does contribute to the patient presenting condition  Chief Complaint   Patient presents with    Back Pain     mid back pain that is moving around -started a few weeks ago under her left breast      Health Maintenance   Topic Date Due    DTaP/Tdap/Td vaccine (1 - Tdap) 02/17/1962    Flu vaccine (1) 09/01/2017    Pneumococcal low/med risk (1 of 2 - PCV13) 06/29/2018 (Originally 2/17/2008)    Colon cancer screen colonoscopy  04/22/2018    Breast cancer screen  04/25/2018    Lipid screen  10/17/2022    Zostavax vaccine  Addressed    DEXA (modify frequency per FRAX score)  Addressed

## 2018-01-15 RX ORDER — ATORVASTATIN CALCIUM 40 MG/1
20 TABLET, FILM COATED ORAL DAILY
Qty: 30 TABLET | Refills: 5 | Status: SHIPPED | OUTPATIENT
Start: 2018-01-15 | End: 2019-01-02 | Stop reason: SDUPTHER

## 2018-01-15 NOTE — TELEPHONE ENCOUNTER
Medication: atrovastatin  Last visit: 11/13/2017  Next visit: Visit date not found  Last refill: 7/28/2017  Pharmacy: [facility]

## 2018-03-22 RX ORDER — CARVEDILOL 12.5 MG/1
12.5 TABLET ORAL 2 TIMES DAILY
Qty: 180 TABLET | Refills: 3 | Status: SHIPPED | OUTPATIENT
Start: 2018-03-22 | End: 2019-01-10 | Stop reason: SDUPTHER

## 2018-03-27 ENCOUNTER — OFFICE VISIT (OUTPATIENT)
Dept: INTERNAL MEDICINE CLINIC | Age: 73
End: 2018-03-27
Payer: MEDICARE

## 2018-03-27 VITALS
SYSTOLIC BLOOD PRESSURE: 139 MMHG | DIASTOLIC BLOOD PRESSURE: 75 MMHG | WEIGHT: 147 LBS | HEIGHT: 61 IN | HEART RATE: 62 BPM | BODY MASS INDEX: 27.75 KG/M2

## 2018-03-27 DIAGNOSIS — I10 ESSENTIAL HYPERTENSION: ICD-10-CM

## 2018-03-27 DIAGNOSIS — I25.10 CORONARY ARTERY DISEASE INVOLVING NATIVE CORONARY ARTERY OF NATIVE HEART WITHOUT ANGINA PECTORIS: ICD-10-CM

## 2018-03-27 DIAGNOSIS — M47.816 SPONDYLOSIS OF LUMBAR REGION WITHOUT MYELOPATHY OR RADICULOPATHY: ICD-10-CM

## 2018-03-27 DIAGNOSIS — E78.2 MIXED HYPERLIPIDEMIA: Primary | ICD-10-CM

## 2018-03-27 DIAGNOSIS — Z12.11 SCREENING FOR COLON CANCER: ICD-10-CM

## 2018-03-27 PROCEDURE — 99214 OFFICE O/P EST MOD 30 MIN: CPT | Performed by: INTERNAL MEDICINE

## 2018-03-27 ASSESSMENT — ENCOUNTER SYMPTOMS
SORE THROAT: 0
EYE ITCHING: 0
CHEST TIGHTNESS: 0
VOICE CHANGE: 0
ABDOMINAL DISTENTION: 0
COUGH: 0
CONSTIPATION: 0
DIARRHEA: 0
PHOTOPHOBIA: 0
VOMITING: 0
RHINORRHEA: 0
TROUBLE SWALLOWING: 0
BLOOD IN STOOL: 0
COLOR CHANGE: 0
EYE PAIN: 0
WHEEZING: 0
FACIAL SWELLING: 0
RECTAL PAIN: 0
CHOKING: 0
BACK PAIN: 1
ANAL BLEEDING: 0
APNEA: 0
SHORTNESS OF BREATH: 0
EYE DISCHARGE: 0
SINUS PRESSURE: 0
EYE REDNESS: 0
ABDOMINAL PAIN: 0
STRIDOR: 0
NAUSEA: 0

## 2018-03-27 NOTE — PROGRESS NOTES
Cardiovascular: Negative for chest pain, palpitations and leg swelling. Gastrointestinal: Negative for abdominal distention, abdominal pain, anal bleeding, blood in stool, constipation, diarrhea, nausea, rectal pain and vomiting. Endocrine: Negative for cold intolerance, heat intolerance, polydipsia, polyphagia and polyuria. Genitourinary: Negative for decreased urine volume, difficulty urinating, dyspareunia, dysuria, enuresis, flank pain, frequency, genital sores, hematuria, menstrual problem, pelvic pain, urgency, vaginal bleeding and vaginal discharge. Musculoskeletal: Positive for back pain. Negative for arthralgias, gait problem, joint swelling, myalgias, neck pain and neck stiffness. Skin: Negative for color change, pallor, rash and wound. Neurological: Negative for dizziness, tremors, seizures, syncope, facial asymmetry, speech difficulty, weakness, light-headedness, numbness and headaches. Hematological: Negative for adenopathy. Does not bruise/bleed easily. Psychiatric/Behavioral: Positive for sleep disturbance. Negative for agitation, behavioral problems, confusion, decreased concentration and dysphoric mood. The patient is not nervous/anxious. Objective:     Physical Exam:      Physical Exam   Constitutional: She is oriented to person, place, and time. She appears well-developed and well-nourished. No distress. HENT:   Head: Normocephalic and atraumatic. Right Ear: External ear normal.   Left Ear: External ear normal.   Nose: Nose normal.   Mouth/Throat: Oropharynx is clear and moist. No oropharyngeal exudate. Eyes: Conjunctivae and EOM are normal. Pupils are equal, round, and reactive to light. Right eye exhibits no discharge. Left eye exhibits no discharge. No scleral icterus. Neck: Normal range of motion. Neck supple. No JVD present. No tracheal deviation present. No thyromegaly present. Cardiovascular: Normal rate, regular rhythm and intact distal pulses.   Exam reveals no gallop and no friction rub. Murmur heard. Pulmonary/Chest: Effort normal and breath sounds normal. No respiratory distress. She has no wheezes. She has no rales. She exhibits no tenderness. Abdominal: Soft. Bowel sounds are normal. She exhibits no distension and no mass. There is no tenderness. There is no rebound and no guarding. Genitourinary: Rectal exam shows guaiac negative stool. No vaginal discharge found. Musculoskeletal: She exhibits tenderness. She exhibits no edema. Lymphadenopathy:     She has no cervical adenopathy. Neurological: She is alert and oriented to person, place, and time. She has normal reflexes. No cranial nerve deficit. She exhibits normal muscle tone. Coordination normal.   Skin: Skin is warm and dry. No rash noted. She is not diaphoretic. No erythema. No pallor. Psychiatric: She has a normal mood and affect. Her behavior is normal. Judgment and thought content normal.         Results for orders placed or performed during the hospital encounter of 10/17/17   Lipid Panel   Result Value Ref Range    Cholesterol 208 (H) <200 mg/dL    HDL 39 (L) >40 mg/dL    LDL Cholesterol      0 - 130 mg/dL    Chol/HDL Ratio 5.3 (H) <5    Triglycerides 516 (H) <150 mg/dL    VLDL NOT REPORTED 1 - 30 mg/dL   LDL Cholesterol, Direct   Result Value Ref Range    LDL Direct 84 <100 mg/dL     No results found. Assessment:     1. Mixed hyperlipidemia   th > 500 on lipitor   Diet control rcheck 4 weeks if still > 200 add tricor   2. Essential hypertension   controlled cont same dcr nl    3. Coronary artery disease involving native coronary artery of native heart without angina pectoris   no angina,  Has stent   4.  Spondylosis of lumbar region without myelopathy or radiculopathy   stable cont same          Plan:      recheck lipids   ordered   Orders Placed This Encounter   Procedures    POCT Fecal Immunochemical Test (FIT)     Standing Status:   Future     Standing Expiration Date:

## 2018-04-05 DIAGNOSIS — Z12.11 SCREENING FOR COLON CANCER: ICD-10-CM

## 2018-04-05 LAB
CONTROL: PRESENT
HEMOCCULT STL QL: POSITIVE

## 2018-04-05 PROCEDURE — 82274 ASSAY TEST FOR BLOOD FECAL: CPT | Performed by: INTERNAL MEDICINE

## 2018-04-13 ENCOUNTER — TELEPHONE (OUTPATIENT)
Dept: INTERNAL MEDICINE CLINIC | Age: 73
End: 2018-04-13

## 2018-04-19 ENCOUNTER — HOSPITAL ENCOUNTER (OUTPATIENT)
Age: 73
Setting detail: SPECIMEN
Discharge: HOME | End: 2018-04-19
Payer: MEDICARE

## 2018-04-19 LAB
ALBUMIN SERPL-MCNC: 4.6 G/DL (ref 3.5–5.2)
ALBUMIN/GLOBULIN RATIO: 1.5 (ref 1–2.5)
ALP BLD-CCNC: 115 U/L (ref 35–104)
ALT SERPL-CCNC: 23 U/L (ref 5–33)
AST SERPL-CCNC: 24 U/L
BILIRUB SERPL-MCNC: 0.51 MG/DL (ref 0.3–1.2)
BILIRUBIN DIRECT: 0.11 MG/DL
BILIRUBIN, INDIRECT: 0.4 MG/DL (ref 0–1)
CHOLESTEROL/HDL RATIO: 4.3
CHOLESTEROL: 179 MG/DL
GLOBULIN: ABNORMAL G/DL (ref 1.5–3.8)
HDLC SERPL-MCNC: 42 MG/DL
LDL CHOLESTEROL: 71 MG/DL (ref 0–130)
TOTAL CK: 83 U/L (ref 26–192)
TOTAL PROTEIN: 7.7 G/DL (ref 6.4–8.3)
TRIGL SERPL-MCNC: 328 MG/DL
VLDLC SERPL CALC-MCNC: ABNORMAL MG/DL (ref 1–30)

## 2018-05-17 ENCOUNTER — TELEPHONE (OUTPATIENT)
Dept: GASTROENTEROLOGY | Age: 73
End: 2018-05-17

## 2018-05-17 DIAGNOSIS — Z86.010 HISTORY OF COLON POLYPS: ICD-10-CM

## 2018-05-17 DIAGNOSIS — D12.6 TUBULOVILLOUS ADENOMA OF COLON: Primary | ICD-10-CM

## 2018-06-12 ENCOUNTER — TELEPHONE (OUTPATIENT)
Dept: GASTROENTEROLOGY | Age: 73
End: 2018-06-12

## 2018-06-16 ENCOUNTER — HOSPITAL ENCOUNTER (OUTPATIENT)
Age: 73
Setting detail: OUTPATIENT SURGERY
Discharge: HOME | End: 2018-06-16
Attending: INTERNAL MEDICINE | Admitting: INTERNAL MEDICINE
Payer: MEDICARE

## 2018-06-16 VITALS
TEMPERATURE: 97.5 F | WEIGHT: 145 LBS | OXYGEN SATURATION: 98 % | RESPIRATION RATE: 16 BRPM | HEART RATE: 52 BPM | BODY MASS INDEX: 27.38 KG/M2 | HEIGHT: 61 IN | DIASTOLIC BLOOD PRESSURE: 47 MMHG | SYSTOLIC BLOOD PRESSURE: 110 MMHG

## 2018-06-16 PROCEDURE — 99152 MOD SED SAME PHYS/QHP 5/>YRS: CPT | Performed by: INTERNAL MEDICINE

## 2018-06-16 PROCEDURE — 7100000010 HC PHASE II RECOVERY - FIRST 15 MIN: Performed by: INTERNAL MEDICINE

## 2018-06-16 PROCEDURE — 88305 TISSUE EXAM BY PATHOLOGIST: CPT

## 2018-06-16 PROCEDURE — 99153 MOD SED SAME PHYS/QHP EA: CPT | Performed by: INTERNAL MEDICINE

## 2018-06-16 PROCEDURE — 7100000011 HC PHASE II RECOVERY - ADDTL 15 MIN: Performed by: INTERNAL MEDICINE

## 2018-06-16 PROCEDURE — 6360000002 HC RX W HCPCS: Performed by: INTERNAL MEDICINE

## 2018-06-16 PROCEDURE — 3609010400 HC COLONOSCOPY POLYPECTOMY HOT BIOPSY: Performed by: INTERNAL MEDICINE

## 2018-06-16 PROCEDURE — C1773 RET DEV, INSERTABLE: HCPCS | Performed by: INTERNAL MEDICINE

## 2018-06-16 PROCEDURE — 2580000003 HC RX 258: Performed by: INTERNAL MEDICINE

## 2018-06-16 RX ORDER — FENTANYL CITRATE 50 UG/ML
INJECTION, SOLUTION INTRAMUSCULAR; INTRAVENOUS PRN
Status: DISCONTINUED | OUTPATIENT
Start: 2018-06-16 | End: 2018-06-16 | Stop reason: HOSPADM

## 2018-06-16 RX ORDER — MIDAZOLAM HYDROCHLORIDE 1 MG/ML
INJECTION INTRAMUSCULAR; INTRAVENOUS PRN
Status: DISCONTINUED | OUTPATIENT
Start: 2018-06-16 | End: 2018-06-16 | Stop reason: HOSPADM

## 2018-06-16 RX ORDER — SODIUM CHLORIDE 9 MG/ML
INJECTION, SOLUTION INTRAVENOUS CONTINUOUS
Status: DISCONTINUED | OUTPATIENT
Start: 2018-06-16 | End: 2018-06-16 | Stop reason: HOSPADM

## 2018-06-16 RX ADMIN — SODIUM CHLORIDE: 9 INJECTION, SOLUTION INTRAVENOUS at 09:07

## 2018-06-16 ASSESSMENT — PAIN SCALES - GENERAL: PAINLEVEL_OUTOF10: 0

## 2018-06-16 ASSESSMENT — PAIN - FUNCTIONAL ASSESSMENT: PAIN_FUNCTIONAL_ASSESSMENT: 0-10

## 2018-06-19 LAB — SURGICAL PATHOLOGY REPORT: NORMAL

## 2018-07-03 ENCOUNTER — OFFICE VISIT (OUTPATIENT)
Dept: INTERNAL MEDICINE CLINIC | Age: 73
End: 2018-07-03
Payer: MEDICARE

## 2018-07-03 VITALS
BODY MASS INDEX: 26.62 KG/M2 | WEIGHT: 141 LBS | DIASTOLIC BLOOD PRESSURE: 70 MMHG | SYSTOLIC BLOOD PRESSURE: 132 MMHG | HEIGHT: 61 IN

## 2018-07-03 DIAGNOSIS — M47.814 OSTEOARTHRITIS OF THORACIC SPINE, UNSPECIFIED SPINAL OSTEOARTHRITIS COMPLICATION STATUS: ICD-10-CM

## 2018-07-03 DIAGNOSIS — I10 ESSENTIAL HYPERTENSION: Primary | ICD-10-CM

## 2018-07-03 DIAGNOSIS — E78.2 MIXED HYPERLIPIDEMIA: ICD-10-CM

## 2018-07-03 DIAGNOSIS — I21.4 NSTEMI (NON-ST ELEVATED MYOCARDIAL INFARCTION) (HCC): ICD-10-CM

## 2018-07-03 PROCEDURE — 99214 OFFICE O/P EST MOD 30 MIN: CPT | Performed by: INTERNAL MEDICINE

## 2018-07-03 RX ORDER — NITROGLYCERIN 0.4 MG/1
0.4 TABLET SUBLINGUAL EVERY 5 MIN PRN
Qty: 25 TABLET | Refills: 3 | Status: SHIPPED | OUTPATIENT
Start: 2018-07-03

## 2018-07-03 ASSESSMENT — ENCOUNTER SYMPTOMS
BACK PAIN: 1
ABDOMINAL DISTENTION: 0
APNEA: 0
RECTAL PAIN: 0
CONSTIPATION: 0
STRIDOR: 0
ABDOMINAL PAIN: 0
SORE THROAT: 0
RHINORRHEA: 0
WHEEZING: 0
SHORTNESS OF BREATH: 1
VOMITING: 0
PHOTOPHOBIA: 0
CHOKING: 0
ANAL BLEEDING: 0
EYE DISCHARGE: 0
VOICE CHANGE: 0
FACIAL SWELLING: 0
DIARRHEA: 0
CHEST TIGHTNESS: 0
TROUBLE SWALLOWING: 0
COUGH: 0
NAUSEA: 0
SINUS PRESSURE: 0
EYE ITCHING: 0
BLOOD IN STOOL: 0
EYE PAIN: 0
EYE REDNESS: 0
COLOR CHANGE: 0

## 2018-07-03 ASSESSMENT — PATIENT HEALTH QUESTIONNAIRE - PHQ9
2. FEELING DOWN, DEPRESSED OR HOPELESS: 0
SUM OF ALL RESPONSES TO PHQ9 QUESTIONS 1 & 2: 0
SUM OF ALL RESPONSES TO PHQ QUESTIONS 1-9: 0
1. LITTLE INTEREST OR PLEASURE IN DOING THINGS: 0

## 2018-07-03 NOTE — PROGRESS NOTES
Chronic Disease Visit Information    BP Readings from Last 3 Encounters:   07/03/18 132/70   06/16/18 (!) 110/47   03/27/18 139/75          Hemoglobin A1C (%)   Date Value   12/19/2015 5.7     LDL Cholesterol (mg/dL)   Date Value   04/19/2018 71     HDL (mg/dL)   Date Value   04/19/2018 42     BUN (mg/dL)   Date Value   07/22/2017 12     CREATININE (mg/dL)   Date Value   07/22/2017 0.64     Glucose (mg/dL)   Date Value   07/22/2017 96   03/06/2012 95            Have you changed or started any medications since your last visit including any over-the-counter medicines, vitamins, or herbal medicines? no   Are you having any side effects from any of your medications? -  no  Have you stopped taking any of your medications? Is so, why? -  no    Have you seen any other physician or provider since your last visit? Yes - Records Obtained  Have you had any other diagnostic tests since your last visit? No  Have you been seen in the emergency room and/or had an admission to a hospital since we last saw you? No  Have you had your annual diabetic retinal (eye) exam? No  Have you had your routine dental cleaning in the past 6 months? no    Have you activated your [employer] account? If not, what are your barriers?  Yes     Patient Care Team:  Scarlet Shore MD as PCP - Xochitl Amaral MD as PCP - S Attributed Provider  Alvino Steinberg MD (Gastroenterology)  Omer Gutierrez MD as Referring Physician (Cardiology)  Caterina Costa MD as Surgeon (Cardiothoracic Surgery)         Medical History Review  Past Medical, Family, and Social History reviewed and does contribute to the patient presenting condition  Chief Complaint   Patient presents with    Hypertension     management    Coronary Artery Disease     no cp sob with exertion      Health Maintenance   Topic Date Due    DTaP/Tdap/Td vaccine (1 - Tdap) 02/17/1962    Shingles Vaccine (1 of 2 - 2 Dose Series) 02/17/1993    Pneumococcal low/med risk (1 of 2 - PCV13) 02/17/2008    A1C test (Diabetic or Prediabetic)  12/19/2016    Flu vaccine (1) 09/01/2018    Colon cancer screen colonoscopy  06/16/2019    Lipid screen  04/19/2023    DEXA (modify frequency per FRAX score)  Addressed

## 2018-07-03 NOTE — PROGRESS NOTES
Subjective: Michele Bryson is a 74 y.o. female who presents today for follow up and management of her chronic medical conditions as noted below. HPI:    Michele Bryson is c/o of   Chief Complaint   Patient presents with    Hypertension     management    Coronary Artery Disease     no cp sob with exertion    .  No cp   sob   jt pains better   no fatigue    Past Medical History:   Diagnosis Date    Diverticulosis of colon     Full dentures     History of colon polyps 04/22/2017    Hyperlipidemia     Hypertension     Osteoarthritis     Osteoarthritis of hand     Osteoarthritis of lower leg, localized     Osteoporosis     Psoriasis     ON TOP & BACK OF  HEAD    Sinus congestion     IN AM    Tubulovillous adenoma of colon     Wears glasses        Past Surgical History:   Procedure Laterality Date    CARDIAC CATHETERIZATION      2 stents    COLONOSCOPY  6/14/2013    diverticulosis, tubulovillous adenoma, questionable prominent mucosa anorectal area, focus of superficial denudation consistent with focal active colitis    COLONOSCOPY  10/25/2013    residual polyp lower right colon, another polyp in the cecum, biopsy benign    COLONOSCOPY  04/22/2017    Total colonoscopy and biopsy and cauterization of polyps, pathology-sessile serrated adenoma x3    COLONOSCOPY N/A 6/16/2018    COLONOSCOPY POLYPECTOMY HOT BIOPSY performed by Francheska Bruner MD at [facility]  12/22/15    OP CABG X 5- Dr Patricio Whitney Bilateral     knee, LT. KNEE X 2     KNEE ARTHROPLASTY Right 12/8/14    MD COLSC FLX W/REMOVAL LESION BY HOT BX FORCEPS N/A 4/22/2017    COLONOSCOPY POLYPECTOMY HOT BIOPSY performed by Francheska Bruner MD at [facility] ([facility])       Family History   Problem Relation Age of Onset    Cancer Mother         lymphoma    Prostate Cancer Brother     Breast Cancer Sister        Social History Social History    Marital status:      Spouse name: N/A    Number of children: N/A    Years of education: N/A     Social History Main Topics    Smoking status: Never Smoker    Smokeless tobacco: Never Used    Alcohol use No    Drug use: No    Sexual activity: Not Asked     Other Topics Concern    None     Social History Narrative    None       Current Outpatient Prescriptions   Medication Sig Dispense Refill    carvedilol (COREG) 12.5 MG tablet Take 1 tablet by mouth 2 times daily 180 tablet 3    atorvastatin (LIPITOR) 40 MG tablet Take 0.5 tablets by mouth daily 30 tablet 5    ticagrelor (BRILINTA) 90 MG TABS tablet Take 1 tablet by mouth 2 times daily 60 tablet 12    nitroGLYCERIN (NITROSTAT) 0.4 MG SL tablet Place 1 tablet under the tongue every 5 minutes as needed for Chest pain up to max of 3 total doses. If no relief after 1 dose, call 911. 25 tablet 3    aspirin 81 MG EC tablet Take 1 tablet by mouth daily 30 tablet 3    Vitamin D (CHOLECALCIFEROL) 1000 UNITS CAPS capsule Take 1,000 Units by mouth daily.  Multiple Vitamins-Minerals (THERAPEUTIC MULTIVITAMIN-MINERALS) tablet Take 1 tablet by mouth daily.  Calcium Carbonate-Vitamin D (CALCIUM + D PO) Take 500 mg by mouth daily.  ascorbic acid (VITAMIN C) 500 MG tablet Take 500 mg by mouth daily.  acetaminophen (TYLENOL) 325 MG tablet Take 650 mg by mouth every 6 hours as needed. No current facility-administered medications for this visit. Review of Systems:    Review of Systems   Constitutional: Negative for activity change, appetite change, chills, diaphoresis, fatigue and fever. HENT: Negative for congestion, dental problem, drooling, ear discharge, ear pain, facial swelling, hearing loss, mouth sores, nosebleeds, postnasal drip, rhinorrhea, sinus pressure, sneezing, sore throat, tinnitus, trouble swallowing and voice change.     Eyes: Negative for photophobia, pain, discharge, redness, itching and visual disturbance. Respiratory: Positive for shortness of breath. Negative for apnea, cough, choking, chest tightness, wheezing and stridor. Cardiovascular: Negative for chest pain, palpitations and leg swelling. Gastrointestinal: Negative for abdominal distention, abdominal pain, anal bleeding, blood in stool, constipation, diarrhea, nausea, rectal pain and vomiting. Endocrine: Negative for cold intolerance, heat intolerance, polydipsia, polyphagia and polyuria. Genitourinary: Negative for decreased urine volume, difficulty urinating, dyspareunia, dysuria, enuresis, flank pain, frequency, genital sores, hematuria, menstrual problem, pelvic pain, urgency, vaginal bleeding and vaginal discharge. Musculoskeletal: Positive for arthralgias and back pain. Negative for gait problem, joint swelling, myalgias, neck pain and neck stiffness. Skin: Negative for color change, pallor, rash and wound. Neurological: Negative for dizziness, tremors, seizures, syncope, facial asymmetry, speech difficulty, weakness, light-headedness, numbness and headaches. Hematological: Negative for adenopathy. Does not bruise/bleed easily. Psychiatric/Behavioral: Positive for sleep disturbance. Negative for agitation, behavioral problems, confusion, decreased concentration and dysphoric mood. The patient is not nervous/anxious. Objective:     Physical Exam:      Physical Exam   Constitutional: She is oriented to person, place, and time. She appears well-developed and well-nourished. No distress. HENT:   Head: Normocephalic and atraumatic. Right Ear: External ear normal.   Left Ear: External ear normal.   Nose: Nose normal.   Mouth/Throat: Oropharynx is clear and moist. No oropharyngeal exudate. Eyes: Conjunctivae and EOM are normal. Pupils are equal, round, and reactive to light. Right eye exhibits no discharge. Left eye exhibits no discharge. No scleral icterus. Neck: Normal range of motion.  Neck supple. No JVD present. No tracheal deviation present. No thyromegaly present. Cardiovascular: Normal rate, regular rhythm and intact distal pulses. Exam reveals no gallop and no friction rub. Murmur heard. Pulmonary/Chest: Effort normal and breath sounds normal. No respiratory distress. She has no wheezes. She has no rales. She exhibits no tenderness. Abdominal: Soft. Bowel sounds are normal. She exhibits no distension and no mass. There is no tenderness. There is no rebound and no guarding. Genitourinary: Rectal exam shows guaiac negative stool. No vaginal discharge found. Musculoskeletal: She exhibits tenderness. She exhibits no edema. Lymphadenopathy:     She has no cervical adenopathy. Neurological: She is alert and oriented to person, place, and time. She has normal reflexes. No cranial nerve deficit. She exhibits normal muscle tone. Coordination normal.   Skin: Skin is warm and dry. No rash noted. She is not diaphoretic. No erythema. No pallor. Psychiatric: She has a normal mood and affect. Her behavior is normal. Judgment and thought content normal.         Results for orders placed or performed during the hospital encounter of 06/16/18   Surgical Pathology   Result Value Ref Range    Surgical Pathology Report       (NOTE)  [address] [facility]  [address]  [phone]  Fax: [phone]  SURGICAL PATHOLOGY REPORT    Patient Name: Santa Block  MR#: [id number]  Specimen #[id number]      Final Diagnosis  SPECIMEN \"A\":  RIGHT COLON, BIOPSY:        TUBULOVILLOUS ADENOMA    SPECIMEN \"B\":  TRANSVERSE COLON, BIOPSY:        TUBULAR ADENOMA      Marisa Biggs D.O.  **Electronically Signed Out**         tc/6/19/2018    Clinical Information  History colon polyps, tubulovillous adenoma colon; colonoscopy  polypectomy hot biopsy, diverticulosis, colon polyps; formalin time:   A-B) No formalin time on surgical slip    Source:  A: Right colon questions answered. Patient voiced understanding. 1.  Patient given educational materials - see patient instructions  2. Discussed use, benefit, and side effects of prescribed medications. Barriers to medication compliance addressed. All patient questions answered. Pt voiced understanding. 3.  Reviewed prior labs and health maintenance  4. Continue current medications, diet and exercise.   5.  3 m

## 2018-07-16 ENCOUNTER — OFFICE VISIT (OUTPATIENT)
Dept: GASTROENTEROLOGY | Age: 73
End: 2018-07-16
Payer: MEDICARE

## 2018-07-16 VITALS
SYSTOLIC BLOOD PRESSURE: 125 MMHG | HEART RATE: 58 BPM | WEIGHT: 142 LBS | DIASTOLIC BLOOD PRESSURE: 65 MMHG | BODY MASS INDEX: 26.83 KG/M2

## 2018-07-16 DIAGNOSIS — Z86.010 HISTORY OF COLON POLYPS: Primary | ICD-10-CM

## 2018-07-16 PROCEDURE — 99213 OFFICE O/P EST LOW 20 MIN: CPT | Performed by: INTERNAL MEDICINE

## 2018-07-16 ASSESSMENT — ENCOUNTER SYMPTOMS
ABDOMINAL PAIN: 0
SHORTNESS OF BREATH: 0
ANAL BLEEDING: 0
ALLERGIC/IMMUNOLOGIC NEGATIVE: 1
CHOKING: 0
BACK PAIN: 0
VOICE CHANGE: 0
COUGH: 0
NAUSEA: 0
RECTAL PAIN: 0
RHINORRHEA: 0
CONSTIPATION: 0
FACIAL SWELLING: 0
SORE THROAT: 0
ABDOMINAL DISTENTION: 0
SINUS PRESSURE: 0
GASTROINTESTINAL NEGATIVE: 1
DIARRHEA: 0
TROUBLE SWALLOWING: 0
VOMITING: 0
BLOOD IN STOOL: 0

## 2018-10-04 ENCOUNTER — OFFICE VISIT (OUTPATIENT)
Dept: INTERNAL MEDICINE CLINIC | Age: 73
End: 2018-10-04
Payer: MEDICARE

## 2018-10-04 ENCOUNTER — TELEPHONE (OUTPATIENT)
Dept: INTERNAL MEDICINE CLINIC | Age: 73
End: 2018-10-04

## 2018-10-04 VITALS
HEART RATE: 62 BPM | BODY MASS INDEX: 27 KG/M2 | DIASTOLIC BLOOD PRESSURE: 64 MMHG | SYSTOLIC BLOOD PRESSURE: 137 MMHG | HEIGHT: 61 IN | WEIGHT: 143 LBS

## 2018-10-04 DIAGNOSIS — E87.6 HYPOKALEMIA: ICD-10-CM

## 2018-10-04 DIAGNOSIS — I10 ESSENTIAL HYPERTENSION: Primary | ICD-10-CM

## 2018-10-04 DIAGNOSIS — I25.10 CORONARY ARTERY DISEASE INVOLVING NATIVE CORONARY ARTERY OF NATIVE HEART WITHOUT ANGINA PECTORIS: ICD-10-CM

## 2018-10-04 DIAGNOSIS — E78.2 MIXED HYPERLIPIDEMIA: ICD-10-CM

## 2018-10-04 PROCEDURE — 99214 OFFICE O/P EST MOD 30 MIN: CPT | Performed by: INTERNAL MEDICINE

## 2018-10-04 RX ORDER — TICAGRELOR 60 MG/1
1 TABLET ORAL DAILY
COMMUNITY
Start: 2018-09-25 | End: 2018-10-04

## 2018-10-04 RX ORDER — FENOFIBRATE 48 MG/1
1 TABLET, COATED ORAL DAILY
COMMUNITY
Start: 2018-09-06 | End: 2020-09-04 | Stop reason: SDUPTHER

## 2018-10-04 ASSESSMENT — ENCOUNTER SYMPTOMS
NAUSEA: 0
RECTAL PAIN: 0
TROUBLE SWALLOWING: 0
PHOTOPHOBIA: 0
BLOOD IN STOOL: 0
CHOKING: 0
EYE DISCHARGE: 0
STRIDOR: 0
VOICE CHANGE: 0
SHORTNESS OF BREATH: 0
CONSTIPATION: 0
VOMITING: 0
EYE PAIN: 0
FACIAL SWELLING: 0
EYE ITCHING: 0
SORE THROAT: 0
ABDOMINAL PAIN: 0
ABDOMINAL DISTENTION: 0
ANAL BLEEDING: 0
DIARRHEA: 0
CHEST TIGHTNESS: 0
SINUS PRESSURE: 0
RHINORRHEA: 0
COUGH: 0
BACK PAIN: 0
APNEA: 0
WHEEZING: 0
COLOR CHANGE: 0
EYE REDNESS: 0

## 2018-10-04 NOTE — PROGRESS NOTES
Negative for photophobia, pain, discharge, redness, itching and visual disturbance. Respiratory: Negative for apnea, cough, choking, chest tightness, shortness of breath, wheezing and stridor. Cardiovascular: Negative for chest pain, palpitations and leg swelling. Gastrointestinal: Negative for abdominal distention, abdominal pain, anal bleeding, blood in stool, constipation, diarrhea, nausea, rectal pain and vomiting. Endocrine: Negative for cold intolerance, heat intolerance, polydipsia, polyphagia and polyuria. Genitourinary: Negative for decreased urine volume, difficulty urinating, dyspareunia, dysuria, enuresis, flank pain, frequency, genital sores, hematuria, menstrual problem, pelvic pain, urgency, vaginal bleeding and vaginal discharge. Musculoskeletal: Positive for arthralgias. Negative for back pain, gait problem, joint swelling, myalgias, neck pain and neck stiffness. Skin: Negative for color change, pallor, rash and wound. Neurological: Negative for dizziness, tremors, seizures, syncope, facial asymmetry, speech difficulty, weakness, light-headedness, numbness and headaches. Hematological: Negative for adenopathy. Does not bruise/bleed easily. Psychiatric/Behavioral: Positive for sleep disturbance. Negative for agitation, behavioral problems, confusion, decreased concentration and dysphoric mood. The patient is not nervous/anxious. Objective:     Physical Exam:      Physical Exam   Constitutional: She is oriented to person, place, and time. She appears well-developed and well-nourished. No distress. HENT:   Head: Normocephalic and atraumatic. Right Ear: External ear normal.   Left Ear: External ear normal.   Nose: Nose normal.   Mouth/Throat: Oropharynx is clear and moist. No oropharyngeal exudate. Eyes: Pupils are equal, round, and reactive to light. Conjunctivae and EOM are normal. Right eye exhibits no discharge. Left eye exhibits no discharge.  No scleral

## 2018-10-04 NOTE — TELEPHONE ENCOUNTER
Patient contacted office in regards to Dr Nadine Lopez has her taking the Brilinta 60mg bid, please change

## 2018-11-06 ENCOUNTER — HOSPITAL ENCOUNTER (OUTPATIENT)
Age: 73
Setting detail: SPECIMEN
Discharge: HOME | End: 2018-11-06
Payer: MEDICARE

## 2018-11-06 LAB
ALBUMIN SERPL-MCNC: 4.7 G/DL (ref 3.5–5.2)
ALBUMIN/GLOBULIN RATIO: 1.7 (ref 1–2.5)
ALP BLD-CCNC: 73 U/L (ref 35–104)
ALT SERPL-CCNC: 19 U/L (ref 5–33)
AST SERPL-CCNC: 26 U/L
BILIRUB SERPL-MCNC: 0.4 MG/DL (ref 0.3–1.2)
BILIRUBIN DIRECT: 0.1 MG/DL
BILIRUBIN, INDIRECT: 0.3 MG/DL (ref 0–1)
CHOLESTEROL, FASTING: 169 MG/DL
CHOLESTEROL/HDL RATIO: 3.4
GLOBULIN: NORMAL G/DL (ref 1.5–3.8)
HDLC SERPL-MCNC: 49 MG/DL
LDL CHOLESTEROL: 86 MG/DL (ref 0–130)
TOTAL CK: 112 U/L (ref 26–192)
TOTAL PROTEIN: 7.5 G/DL (ref 6.4–8.3)
TRIGLYCERIDE, FASTING: 171 MG/DL
VLDLC SERPL CALC-MCNC: ABNORMAL MG/DL (ref 1–30)

## 2019-01-02 RX ORDER — ATORVASTATIN CALCIUM 20 MG/1
20 TABLET, FILM COATED ORAL DAILY
Qty: 90 TABLET | Refills: 3 | Status: SHIPPED | OUTPATIENT
Start: 2019-01-02 | End: 2019-10-08 | Stop reason: SDUPTHER

## 2019-01-10 DIAGNOSIS — I10 ESSENTIAL HYPERTENSION: Primary | ICD-10-CM

## 2019-01-10 RX ORDER — CARVEDILOL 12.5 MG/1
TABLET ORAL
Qty: 180 TABLET | Refills: 0 | Status: SHIPPED | OUTPATIENT
Start: 2019-01-10 | End: 2019-03-21 | Stop reason: SDUPTHER

## 2019-01-14 RX ORDER — ATORVASTATIN CALCIUM 40 MG/1
TABLET, FILM COATED ORAL
Qty: 30 TABLET | Refills: 5 | Status: SHIPPED | OUTPATIENT
Start: 2019-01-14 | End: 2019-06-03

## 2019-03-21 DIAGNOSIS — I10 ESSENTIAL HYPERTENSION: ICD-10-CM

## 2019-03-21 RX ORDER — CARVEDILOL 12.5 MG/1
12.5 TABLET ORAL 2 TIMES DAILY
Qty: 180 TABLET | Refills: 3 | Status: SHIPPED | OUTPATIENT
Start: 2019-03-21 | End: 2020-04-10 | Stop reason: SDUPTHER

## 2019-03-26 ENCOUNTER — HOSPITAL ENCOUNTER (OUTPATIENT)
Age: 74
Setting detail: SPECIMEN
Discharge: HOME | End: 2019-03-26
Payer: MEDICARE

## 2019-03-26 LAB
ALT SERPL-CCNC: 20 U/L (ref 5–33)
AST SERPL-CCNC: 22 U/L
CHOLESTEROL, FASTING: 152 MG/DL
CHOLESTEROL/HDL RATIO: 3.2
HDLC SERPL-MCNC: 48 MG/DL
LDL CHOLESTEROL: 74 MG/DL (ref 0–130)
TRIGLYCERIDE, FASTING: 148 MG/DL
VLDLC SERPL CALC-MCNC: NORMAL MG/DL (ref 1–30)

## 2019-06-03 ENCOUNTER — OFFICE VISIT (OUTPATIENT)
Dept: INTERNAL MEDICINE CLINIC | Age: 74
End: 2019-06-03
Payer: MEDICARE

## 2019-06-03 VITALS
SYSTOLIC BLOOD PRESSURE: 110 MMHG | HEIGHT: 61 IN | BODY MASS INDEX: 27.38 KG/M2 | DIASTOLIC BLOOD PRESSURE: 60 MMHG | WEIGHT: 145 LBS

## 2019-06-03 DIAGNOSIS — E78.2 MIXED HYPERLIPIDEMIA: ICD-10-CM

## 2019-06-03 DIAGNOSIS — M54.50 ACUTE BILATERAL LOW BACK PAIN WITHOUT SCIATICA: ICD-10-CM

## 2019-06-03 DIAGNOSIS — I10 ESSENTIAL HYPERTENSION: ICD-10-CM

## 2019-06-03 DIAGNOSIS — I25.10 CORONARY ARTERY DISEASE INVOLVING NATIVE CORONARY ARTERY OF NATIVE HEART WITHOUT ANGINA PECTORIS: Primary | ICD-10-CM

## 2019-06-03 PROCEDURE — 99214 OFFICE O/P EST MOD 30 MIN: CPT | Performed by: INTERNAL MEDICINE

## 2019-06-03 RX ORDER — TIZANIDINE 4 MG/1
4 TABLET ORAL 3 TIMES DAILY
Qty: 30 TABLET | Refills: 1 | Status: SHIPPED | OUTPATIENT
Start: 2019-06-03 | End: 2019-10-08 | Stop reason: ALTCHOICE

## 2019-06-03 RX ORDER — METHYLPREDNISOLONE 4 MG/1
TABLET ORAL
Qty: 1 KIT | Refills: 0 | Status: SHIPPED | OUTPATIENT
Start: 2019-06-03 | End: 2019-06-09

## 2019-06-03 RX ORDER — MELOXICAM 15 MG/1
15 TABLET ORAL DAILY
Qty: 30 TABLET | Refills: 3 | Status: SHIPPED | OUTPATIENT
Start: 2019-06-03 | End: 2019-10-08 | Stop reason: ALTCHOICE

## 2019-06-03 ASSESSMENT — ENCOUNTER SYMPTOMS
SORE THROAT: 0
ABDOMINAL PAIN: 0
CHEST TIGHTNESS: 0
EYE ITCHING: 0
ANAL BLEEDING: 0
PHOTOPHOBIA: 0
SINUS PRESSURE: 0
CHOKING: 0
VOMITING: 0
SHORTNESS OF BREATH: 0
DIARRHEA: 0
COUGH: 0
RHINORRHEA: 0
EYE DISCHARGE: 0
CONSTIPATION: 0
RECTAL PAIN: 0
FACIAL SWELLING: 0
BACK PAIN: 1
COLOR CHANGE: 0
EYE PAIN: 0
APNEA: 0
NAUSEA: 0
VOICE CHANGE: 0
STRIDOR: 0
TROUBLE SWALLOWING: 0
WHEEZING: 0
ABDOMINAL DISTENTION: 0
EYE REDNESS: 0
BLOOD IN STOOL: 0

## 2019-06-03 ASSESSMENT — PATIENT HEALTH QUESTIONNAIRE - PHQ9
2. FEELING DOWN, DEPRESSED OR HOPELESS: 0
1. LITTLE INTEREST OR PLEASURE IN DOING THINGS: 0
SUM OF ALL RESPONSES TO PHQ QUESTIONS 1-9: 0
SUM OF ALL RESPONSES TO PHQ QUESTIONS 1-9: 0
SUM OF ALL RESPONSES TO PHQ9 QUESTIONS 1 & 2: 0

## 2019-06-03 NOTE — PROGRESS NOTES
Socioeconomic History    Marital status:      Spouse name: None    Number of children: None    Years of education: None    Highest education level: None   Occupational History    None   Social Needs    Financial resource strain: None    Food insecurity:     Worry: None     Inability: None    Transportation needs:     Medical: None     Non-medical: None   Tobacco Use    Smoking status: Never Smoker    Smokeless tobacco: Never Used   Substance and Sexual Activity    Alcohol use: No     Alcohol/week: 0.0 oz    Drug use: No    Sexual activity: None   Lifestyle    Physical activity:     Days per week: None     Minutes per session: None    Stress: None   Relationships    Social connections:     Talks on phone: None     Gets together: None     Attends Judaism service: None     Active member of club or organization: None     Attends meetings of clubs or organizations: None     Relationship status: None    Intimate partner violence:     Fear of current or ex partner: None     Emotionally abused: None     Physically abused: None     Forced sexual activity: None   Other Topics Concern    None   Social History Narrative    None       Current Outpatient Medications   Medication Sig Dispense Refill    meloxicam (MOBIC) 15 MG tablet Take 1 tablet by mouth daily 30 tablet 3    tiZANidine (ZANAFLEX) 4 MG tablet Take 1 tablet by mouth 3 times daily 30 tablet 1    methylPREDNISolone (MEDROL, JO,) 4 MG tablet Take as directed 1 kit 0    carvedilol (COREG) 12.5 MG tablet Take 1 tablet by mouth 2 times daily 180 tablet 3    atorvastatin (LIPITOR) 20 MG tablet Take 1 tablet by mouth daily 90 tablet 3    fenofibrate (TRICOR) 48 MG tablet Take 1 tablet by mouth daily      ticagrelor (BRILINTA) 60 MG TABS tablet Take 60 mg by mouth 2 times daily      nitroGLYCERIN (NITROSTAT) 0.4 MG SL tablet Place 1 tablet under the tongue every 5 minutes as needed for Chest pain up to max of 3 total doses.  If no exhibits normal muscle tone. Coordination normal.   Skin: Skin is warm and dry. No rash noted. She is not diaphoretic. No erythema. No pallor. Psychiatric: She has a normal mood and affect. Her behavior is normal. Judgment and thought content normal.         Results for orders placed or performed during the hospital encounter of 03/26/19   ALT   Result Value Ref Range    ALT 20 5 - 33 U/L   AST   Result Value Ref Range    AST 22 <32 U/L   Lipid, Fasting   Result Value Ref Range    Cholesterol, Fasting 152 <200 mg/dL    HDL 48 >40 mg/dL    LDL Cholesterol 74 0 - 130 mg/dL    Chol/HDL Ratio 3.2 <5    Triglyceride, Fasting 148 <150 mg/dL    VLDL NOT REPORTED 1 - 30 mg/dL     No results found. Assessment:      Diagnosis Orders   1. Coronary artery disease involving native coronary artery of native heart without angina pectoris   stable no angina   2. Essential hypertension  Controlled cr nl conty same   3. Mixed hyperlipidemia   at goal    4. Acute bilateral low back pain without sciatica     Was lifting   on mobic/zanaflex/ medrol      hr 60  ? Vagal from pain    F/y 4 weeks    Plan:     No orders of the defined types were placed in this encounter. Orders Placed This Encounter   Medications    meloxicam (MOBIC) 15 MG tablet     Sig: Take 1 tablet by mouth daily     Dispense:  30 tablet     Refill:  3    tiZANidine (ZANAFLEX) 4 MG tablet     Sig: Take 1 tablet by mouth 3 times daily     Dispense:  30 tablet     Refill:  1    methylPREDNISolone (MEDROL, JO,) 4 MG tablet     Sig: Take as directed     Dispense:  1 kit     Refill:  0             Dulce received counseling on the following healthy behaviors: nutrition, exercise and medication adherence  Reviewed prior labs and health maintenance. Continue current medications, diet and exercise. Discussed use, benefit, and side effects of prescribed medications. Barriers to medication compliance addressed.    Patient given educational materials - see patient

## 2019-06-03 NOTE — PROGRESS NOTES
Chronic Disease Visit Information    BP Readings from Last 3 Encounters:   10/04/18 137/64   07/16/18 125/65   07/03/18 132/70          Hemoglobin A1C (%)   Date Value   12/19/2015 5.7     LDL Cholesterol (mg/dL)   Date Value   03/26/2019 74     HDL (mg/dL)   Date Value   03/26/2019 48     BUN (mg/dL)   Date Value   07/22/2017 12     CREATININE (mg/dL)   Date Value   07/22/2017 0.64     Glucose (mg/dL)   Date Value   07/22/2017 96   03/06/2012 95            Have you changed or started any medications since your last visit including any over-the-counter medicines, vitamins, or herbal medicines? no   Are you having any side effects from any of your medications? -  no  Have you stopped taking any of your medications? Is so, why? -  no    Have you seen any other physician or provider since your last visit? No  Have you had any other diagnostic tests since your last visit? Yes - Records Obtained  Have you been seen in the emergency room and/or had an admission to a hospital since we last saw you? No  Have you had your annual diabetic retinal (eye) exam? No  Have you had your routine dental cleaning in the past 6 months? no    Have you activated your [employer] account? If not, what are your barriers?  Yes     Patient Care Team:  Sheri Tiwari MD as PCP - Suki Perez MD as PCP - [facility]  Hussein Patel MD (Gastroenterology)  Lowell Henry MD as Referring Physician (Cardiology)  Jolie Marino MD as Surgeon (Cardiothoracic Surgery)         Medical History Review  Past Medical, Family, and Social History reviewed and does contribute to the patient presenting condition  Chief Complaint   Patient presents with    Coronary Artery Disease     sees cardiologist     Hypertension     management      Health Maintenance   Topic Date Due    DTaP/Tdap/Td vaccine (1 - Tdap) 02/17/1962    Shingles Vaccine (1 of 2) 02/17/1993    Pneumococcal 65+ years Vaccine (1 of 2 - PCV13) 02/17/2008    Flu vaccine (Season Ended) 09/01/2019    DEXA (modify frequency per FRAX score)  Addressed

## 2019-07-08 ENCOUNTER — OFFICE VISIT (OUTPATIENT)
Dept: INTERNAL MEDICINE CLINIC | Age: 74
End: 2019-07-08
Payer: MEDICARE

## 2019-07-08 ENCOUNTER — HOSPITAL ENCOUNTER (OUTPATIENT)
Age: 74
Setting detail: SPECIMEN
Discharge: HOME | End: 2019-07-08
Payer: MEDICARE

## 2019-07-08 VITALS
HEIGHT: 61 IN | DIASTOLIC BLOOD PRESSURE: 80 MMHG | HEART RATE: 63 BPM | SYSTOLIC BLOOD PRESSURE: 134 MMHG | RESPIRATION RATE: 16 BRPM | BODY MASS INDEX: 27.75 KG/M2 | WEIGHT: 147 LBS

## 2019-07-08 DIAGNOSIS — I10 ESSENTIAL HYPERTENSION: Primary | ICD-10-CM

## 2019-07-08 DIAGNOSIS — M54.50 ACUTE BILATERAL LOW BACK PAIN WITHOUT SCIATICA: ICD-10-CM

## 2019-07-08 DIAGNOSIS — I10 ESSENTIAL HYPERTENSION: ICD-10-CM

## 2019-07-08 DIAGNOSIS — E78.2 MIXED HYPERLIPIDEMIA: ICD-10-CM

## 2019-07-08 DIAGNOSIS — I25.10 CORONARY ARTERY DISEASE INVOLVING NATIVE CORONARY ARTERY OF NATIVE HEART WITHOUT ANGINA PECTORIS: ICD-10-CM

## 2019-07-08 LAB
-: NORMAL
ABSOLUTE EOS #: 0.18 K/UL (ref 0–0.44)
ABSOLUTE IMMATURE GRANULOCYTE: <0.03 K/UL (ref 0–0.3)
ABSOLUTE LYMPH #: 2.26 K/UL (ref 1.1–3.7)
ABSOLUTE MONO #: 0.5 K/UL (ref 0.1–1.2)
ALBUMIN SERPL-MCNC: 4.5 G/DL (ref 3.5–5.2)
ALBUMIN/GLOBULIN RATIO: 1.7 (ref 1–2.5)
ALP BLD-CCNC: 57 U/L (ref 35–104)
ALT SERPL-CCNC: 22 U/L (ref 5–33)
AMORPHOUS: NORMAL
ANION GAP SERPL CALCULATED.3IONS-SCNC: 13 MMOL/L (ref 9–17)
AST SERPL-CCNC: 26 U/L
BACTERIA: NORMAL
BASOPHILS # BLD: 0 % (ref 0–2)
BASOPHILS ABSOLUTE: <0.03 K/UL (ref 0–0.2)
BILIRUB SERPL-MCNC: 0.28 MG/DL (ref 0.3–1.2)
BILIRUBIN URINE: NEGATIVE
BUN BLDV-MCNC: 18 MG/DL (ref 8–23)
BUN/CREAT BLD: ABNORMAL (ref 9–20)
CALCIUM SERPL-MCNC: 10.3 MG/DL (ref 8.6–10.4)
CASTS UA: NORMAL /LPF (ref 0–8)
CHLORIDE BLD-SCNC: 103 MMOL/L (ref 98–107)
CO2: 24 MMOL/L (ref 20–31)
COLOR: YELLOW
COMMENT UA: ABNORMAL
CREAT SERPL-MCNC: 0.74 MG/DL (ref 0.5–0.9)
CRYSTALS, UA: NORMAL /HPF
DIFFERENTIAL TYPE: NORMAL
EOSINOPHILS RELATIVE PERCENT: 3 % (ref 1–4)
EPITHELIAL CELLS UA: NORMAL /HPF (ref 0–5)
GFR AFRICAN AMERICAN: >60 ML/MIN
GFR NON-AFRICAN AMERICAN: >60 ML/MIN
GFR SERPL CREATININE-BSD FRML MDRD: ABNORMAL ML/MIN/{1.73_M2}
GFR SERPL CREATININE-BSD FRML MDRD: ABNORMAL ML/MIN/{1.73_M2}
GLUCOSE BLD-MCNC: 106 MG/DL (ref 70–99)
GLUCOSE URINE: NEGATIVE
HCT VFR BLD CALC: 41.5 % (ref 36.3–47.1)
HEMOGLOBIN: 12.8 G/DL (ref 11.9–15.1)
IMMATURE GRANULOCYTES: 0 %
KETONES, URINE: NEGATIVE
LEUKOCYTE ESTERASE, URINE: ABNORMAL
LYMPHOCYTES # BLD: 36 % (ref 24–43)
MCH RBC QN AUTO: 27.7 PG (ref 25.2–33.5)
MCHC RBC AUTO-ENTMCNC: 30.8 G/DL (ref 28.4–34.8)
MCV RBC AUTO: 89.8 FL (ref 82.6–102.9)
MONOCYTES # BLD: 8 % (ref 3–12)
MUCUS: NORMAL
NITRITE, URINE: NEGATIVE
NRBC AUTOMATED: 0 PER 100 WBC
OTHER OBSERVATIONS UA: NORMAL
PDW BLD-RTO: 13.2 % (ref 11.8–14.4)
PH UA: 6.5 (ref 5–8)
PLATELET # BLD: 312 K/UL (ref 138–453)
PLATELET ESTIMATE: NORMAL
PMV BLD AUTO: 10.2 FL (ref 8.1–13.5)
POTASSIUM SERPL-SCNC: 4.3 MMOL/L (ref 3.7–5.3)
PROTEIN UA: NEGATIVE
RBC # BLD: 4.62 M/UL (ref 3.95–5.11)
RBC # BLD: NORMAL 10*6/UL
RBC UA: NORMAL /HPF (ref 0–4)
RENAL EPITHELIAL, UA: NORMAL /HPF
SEG NEUTROPHILS: 53 % (ref 36–65)
SEGMENTED NEUTROPHILS ABSOLUTE COUNT: 3.28 K/UL (ref 1.5–8.1)
SODIUM BLD-SCNC: 140 MMOL/L (ref 135–144)
SPECIFIC GRAVITY UA: 1.01 (ref 1–1.03)
TOTAL PROTEIN: 7.2 G/DL (ref 6.4–8.3)
TRICHOMONAS: NORMAL
TSH SERPL DL<=0.05 MIU/L-ACNC: 0.82 MIU/L (ref 0.3–5)
TURBIDITY: CLEAR
URINE HGB: NEGATIVE
UROBILINOGEN, URINE: NORMAL
WBC # BLD: 6.3 K/UL (ref 3.5–11.3)
WBC # BLD: NORMAL 10*3/UL
WBC UA: NORMAL /HPF (ref 0–5)
YEAST: NORMAL

## 2019-07-08 PROCEDURE — 99214 OFFICE O/P EST MOD 30 MIN: CPT | Performed by: INTERNAL MEDICINE

## 2019-07-08 ASSESSMENT — ENCOUNTER SYMPTOMS
VOICE CHANGE: 0
CHOKING: 0
DIARRHEA: 0
APNEA: 0
BLOOD IN STOOL: 0
ABDOMINAL DISTENTION: 0
RHINORRHEA: 0
RECTAL PAIN: 0
SHORTNESS OF BREATH: 0
COUGH: 0
CONSTIPATION: 0
CHEST TIGHTNESS: 0
VOMITING: 0
EYE PAIN: 0
EYE REDNESS: 0
TROUBLE SWALLOWING: 0
EYE ITCHING: 0
SORE THROAT: 0
ANAL BLEEDING: 0
PHOTOPHOBIA: 0
STRIDOR: 0
NAUSEA: 0
WHEEZING: 0
BACK PAIN: 1
EYE DISCHARGE: 0
SINUS PRESSURE: 0
FACIAL SWELLING: 0
COLOR CHANGE: 0
ABDOMINAL PAIN: 0

## 2019-07-08 NOTE — PROGRESS NOTES
Subjective: Chapo Montana is a 66 y.o. female who presents today for follow up and management of her chronic medical conditions as noted below. HPI:    Chapo Montana is c/o of   Chief Complaint   Patient presents with    Follow-up    Hypertension   . Back pain better   no cp   sob     Past Medical History:   Diagnosis Date    Diverticulosis of colon     Full dentures     History of colon polyps 04/22/2017    Hyperlipidemia     Hypertension     Osteoarthritis     Osteoarthritis of hand     Osteoarthritis of lower leg, localized     Osteoporosis     Psoriasis     ON TOP & BACK OF  HEAD    Sinus congestion     IN AM    Tubulovillous adenoma of colon     Wears glasses        Past Surgical History:   Procedure Laterality Date    CARDIAC CATHETERIZATION      2 stents    COLONOSCOPY  6/14/2013    diverticulosis, tubulovillous adenoma, questionable prominent mucosa anorectal area, focus of superficial denudation consistent with focal active colitis    COLONOSCOPY  10/25/2013    residual polyp lower right colon, another polyp in the cecum, biopsy benign    COLONOSCOPY  04/22/2017    Total colonoscopy and biopsy and cauterization of polyps, pathology-sessile serrated adenoma x3    COLONOSCOPY N/A 6/16/2018    COLONOSCOPY POLYPECTOMY HOT BIOPSY performed by Darnell Augustine MD at [address] [place] [address]  12/22/15    OP CABG X 5- Dr Heri Arnold Bilateral     knee, LT. KNEE X 2     KNEE ARTHROPLASTY Right 12/8/14    PA COLSC FLX W/REMOVAL LESION BY HOT BX FORCEPS N/A 4/22/2017    COLONOSCOPY POLYPECTOMY HOT BIOPSY performed by Darnell Augustine MD at [address] [place] [address] (BENIGN)       Family History   Problem Relation Age of Onset    Cancer Mother         lymphoma    Prostate Cancer Brother     Breast Cancer Sister        Social History     Socioeconomic History    Marital status:       Spouse name: None    Number of children: None    Years of education: None    Highest education level: None   Occupational History    None   Social Needs    Financial resource strain: None    Food insecurity:     Worry: None     Inability: None    Transportation needs:     Medical: None     Non-medical: None   Tobacco Use    Smoking status: Never Smoker    Smokeless tobacco: Never Used   Substance and Sexual Activity    Alcohol use: No     Alcohol/week: 0.0 oz    Drug use: No    Sexual activity: None   Lifestyle    Physical activity:     Days per week: None     Minutes per session: None    Stress: None   Relationships    Social connections:     Talks on phone: None     Gets together: None     Attends [employer]ism service: None     Active member of club or organization: None     Attends meetings of clubs or organizations: None     Relationship status: None    Intimate partner violence:     Fear of current or ex partner: None     Emotionally abused: None     Physically abused: None     Forced sexual activity: None   Other Topics Concern    None   Social History Narrative    None       Current Outpatient Medications   Medication Sig Dispense Refill    meloxicam (MOBIC) 15 MG tablet Take 1 tablet by mouth daily 30 tablet 3    tiZANidine (ZANAFLEX) 4 MG tablet Take 1 tablet by mouth 3 times daily 30 tablet 1    carvedilol (COREG) 12.5 MG tablet Take 1 tablet by mouth 2 times daily 180 tablet 3    atorvastatin (LIPITOR) 20 MG tablet Take 1 tablet by mouth daily 90 tablet 3    fenofibrate (TRICOR) 48 MG tablet Take 1 tablet by mouth daily      ticagrelor (BRILINTA) 60 MG TABS tablet Take 60 mg by mouth 2 times daily      nitroGLYCERIN (NITROSTAT) 0.4 MG SL tablet Place 1 tablet under the tongue every 5 minutes as needed for Chest pain up to max of 3 total doses.  If no relief after 1 dose, call 911. 25 tablet 3    aspirin 81 MG EC tablet Take 1 tablet by mouth daily 30 tablet 3    Vitamin D (CHOLECALCIFEROL) 1000 Psychiatric: She has a normal mood and affect. Her behavior is normal. Judgment and thought content normal.         Results for orders placed or performed during the hospital encounter of 03/26/19   ALT   Result Value Ref Range    ALT 20 5 - 33 U/L   AST   Result Value Ref Range    AST 22 <32 U/L   Lipid, Fasting   Result Value Ref Range    Cholesterol, Fasting 152 <200 mg/dL    HDL 48 >40 mg/dL    LDL Cholesterol 74 0 - 130 mg/dL    Chol/HDL Ratio 3.2 <5    Triglyceride, Fasting 148 <150 mg/dL    VLDL NOT REPORTED 1 - 30 mg/dL     No results found. Assessment:      Diagnosis Orders   1. Essential hypertension  CBC Auto Differential controlled check cr /k    Comprehensive Metabolic Panel    TSH without Reflex    Urinalysis   2. Mixed hyperlipidemia  At goal cont same   3. Coronary artery disease involving native coronary artery of native heart without angina pectoris   no angina  better   4. Acute bilateral low back pain without sciatica   mobic helping no see check cr          Plan:     Orders Placed This Encounter   Procedures    CBC Auto Differential     Standing Status:   Future     Standing Expiration Date:   7/7/2020    Comprehensive Metabolic Panel     Standing Status:   Future     Standing Expiration Date:   7/7/2020    TSH without Reflex     Standing Status:   Future     Standing Expiration Date:   7/7/2020    Urinalysis     Standing Status:   Future     Standing Expiration Date:   7/7/2020     Order Specific Question:   SPECIFY(EX-CATH,MIDSTREAM,CYSTO,ETC)? Answer:   midstream       No orders of the defined types were placed in this encounter. Cont same meds                 Page Quale received counseling on the following healthy behaviors: nutrition, exercise and medication adherence  Reviewed prior labs and health maintenance. Continue current medications, diet and exercise. Discussed use, benefit, and side effects of prescribed medications. Barriers to medication compliance addressed.

## 2019-07-08 NOTE — PROGRESS NOTES
Chronic Disease Visit Information    BP Readings from Last 3 Encounters:   06/03/19 110/60   10/04/18 137/64   07/16/18 125/65          Hemoglobin A1C (%)   Date Value   12/19/2015 5.7     LDL Cholesterol (mg/dL)   Date Value   03/26/2019 74     HDL (mg/dL)   Date Value   03/26/2019 48     BUN (mg/dL)   Date Value   07/22/2017 12     CREATININE (mg/dL)   Date Value   07/22/2017 0.64     Glucose (mg/dL)   Date Value   07/22/2017 96   03/06/2012 95            Have you changed or started any medications since your last visit including any over-the-counter medicines, vitamins, or herbal medicines? no   Are you having any side effects from any of your medications? -  no  Have you stopped taking any of your medications? Is so, why? -  no    Have you seen any other physician or provider since your last visit? No  Have you had any other diagnostic tests since your last visit? No  Have you been seen in the emergency room and/or had an admission to a hospital since we last saw you? No  Have you had your annual diabetic retinal (eye) exam? No  Have you had your routine dental cleaning in the past 6 months? no    Have you activated your [employer] account? If not, what are your barriers?  Yes     Patient Care Team:  Dedra Wesley MD as PCP - Cricket Duque MD as PCP - [facility]  Jeff Faulkner MD (Gastroenterology)  Moreno Mendiola MD as Referring Physician (Cardiology)  Tamar Vazquez MD as Surgeon (Cardiothoracic Surgery)         Medical History Review  Past Medical, Family, and Social History reviewed and does contribute to the patient presenting condition    Health Maintenance   Topic Date Due    DTaP/Tdap/Td vaccine (1 - Tdap) 02/17/1962    Shingles Vaccine (1 of 2) 02/17/1993    Annual Wellness Visit (AWV)  02/17/2006    Pneumococcal 65+ years Vaccine (1 of 2 - PCV13) 06/26/2020 (Originally 2/17/2008)    Flu vaccine (1) 09/01/2019    DEXA (modify frequency per FRAX score)  Addressed

## 2019-10-08 ENCOUNTER — OFFICE VISIT (OUTPATIENT)
Dept: INTERNAL MEDICINE CLINIC | Age: 74
End: 2019-10-08
Payer: MEDICARE

## 2019-10-08 VITALS
HEIGHT: 61 IN | SYSTOLIC BLOOD PRESSURE: 140 MMHG | DIASTOLIC BLOOD PRESSURE: 78 MMHG | WEIGHT: 144 LBS | BODY MASS INDEX: 27.19 KG/M2

## 2019-10-08 DIAGNOSIS — M54.50 ACUTE BILATERAL LOW BACK PAIN WITHOUT SCIATICA: ICD-10-CM

## 2019-10-08 DIAGNOSIS — E78.2 MIXED HYPERLIPIDEMIA: ICD-10-CM

## 2019-10-08 DIAGNOSIS — I10 ESSENTIAL HYPERTENSION: Primary | ICD-10-CM

## 2019-10-08 DIAGNOSIS — I21.4 NSTEMI (NON-ST ELEVATED MYOCARDIAL INFARCTION) (HCC): ICD-10-CM

## 2019-10-08 PROCEDURE — 99213 OFFICE O/P EST LOW 20 MIN: CPT | Performed by: INTERNAL MEDICINE

## 2019-10-08 RX ORDER — ATORVASTATIN CALCIUM 20 MG/1
20 TABLET, FILM COATED ORAL DAILY
Qty: 90 TABLET | Refills: 1 | Status: SHIPPED | OUTPATIENT
Start: 2019-10-08

## 2019-10-08 RX ORDER — ATORVASTATIN CALCIUM 20 MG/1
20 TABLET, FILM COATED ORAL DAILY
Qty: 90 TABLET | Refills: 0 | Status: SHIPPED | OUTPATIENT
Start: 2019-12-08 | End: 2020-04-10 | Stop reason: SDUPTHER

## 2019-10-08 ASSESSMENT — ENCOUNTER SYMPTOMS
EYE ITCHING: 0
SINUS PRESSURE: 0
ANAL BLEEDING: 0
ABDOMINAL PAIN: 0
PHOTOPHOBIA: 0
CHEST TIGHTNESS: 0
VOMITING: 0
CHOKING: 0
SORE THROAT: 0
APNEA: 0
ABDOMINAL DISTENTION: 0
COUGH: 0
VOICE CHANGE: 0
RECTAL PAIN: 0
DIARRHEA: 0
BACK PAIN: 1
TROUBLE SWALLOWING: 0
STRIDOR: 0
EYE REDNESS: 0
BLOOD IN STOOL: 0
FACIAL SWELLING: 0
SHORTNESS OF BREATH: 1
CONSTIPATION: 0
EYE PAIN: 0
NAUSEA: 0
WHEEZING: 0
EYE DISCHARGE: 0
COLOR CHANGE: 0
RHINORRHEA: 0

## 2020-03-02 ENCOUNTER — TELEPHONE (OUTPATIENT)
Dept: FAMILY MEDICINE CLINIC | Age: 75
End: 2020-03-02

## 2020-03-02 NOTE — TELEPHONE ENCOUNTER
Telephone Outcome: Other - not a patient of [facility], patient sees Dr Dyllan Joel at [facility]

## 2020-04-13 RX ORDER — CARVEDILOL 12.5 MG/1
12.5 TABLET ORAL 2 TIMES DAILY
Qty: 180 TABLET | Refills: 0 | Status: SHIPPED | OUTPATIENT
Start: 2020-04-13 | End: 2021-07-02 | Stop reason: SDUPTHER

## 2020-04-13 RX ORDER — ATORVASTATIN CALCIUM 20 MG/1
20 TABLET, FILM COATED ORAL DAILY
Qty: 30 TABLET | Refills: 0 | Status: SHIPPED | OUTPATIENT
Start: 2020-04-13 | End: 2020-07-17

## 2020-05-19 ENCOUNTER — HOSPITAL ENCOUNTER (OUTPATIENT)
Age: 75
Setting detail: SPECIMEN
Discharge: HOME | End: 2020-05-19
Payer: MEDICARE

## 2020-05-19 LAB
ALT SERPL-CCNC: 23 U/L (ref 5–33)
AST SERPL-CCNC: 26 U/L
CHOLESTEROL, FASTING: 183 MG/DL
CHOLESTEROL/HDL RATIO: 4.2
HDLC SERPL-MCNC: 44 MG/DL
LDL CHOLESTEROL: 89 MG/DL (ref 0–130)
TRIGLYCERIDE, FASTING: 249 MG/DL
VLDLC SERPL CALC-MCNC: ABNORMAL MG/DL (ref 1–30)

## 2020-07-17 ENCOUNTER — OFFICE VISIT (OUTPATIENT)
Dept: INTERNAL MEDICINE CLINIC | Age: 75
End: 2020-07-17
Payer: MEDICARE

## 2020-07-17 VITALS
HEART RATE: 80 BPM | DIASTOLIC BLOOD PRESSURE: 88 MMHG | OXYGEN SATURATION: 95 % | SYSTOLIC BLOOD PRESSURE: 138 MMHG | TEMPERATURE: 97.1 F | BODY MASS INDEX: 28.89 KG/M2 | WEIGHT: 153 LBS | HEIGHT: 61 IN

## 2020-07-17 PROCEDURE — 99214 OFFICE O/P EST MOD 30 MIN: CPT | Performed by: INTERNAL MEDICINE

## 2020-07-17 RX ORDER — CLOPIDOGREL BISULFATE 75 MG/1
75 TABLET ORAL DAILY
Status: ON HOLD | COMMUNITY
End: 2020-12-24 | Stop reason: HOSPADM

## 2020-07-17 RX ORDER — HYDROCHLOROTHIAZIDE 12.5 MG/1
12.5 TABLET ORAL DAILY
COMMUNITY

## 2020-07-17 ASSESSMENT — PATIENT HEALTH QUESTIONNAIRE - PHQ9
SUM OF ALL RESPONSES TO PHQ QUESTIONS 1-9: 0
1. LITTLE INTEREST OR PLEASURE IN DOING THINGS: 0
2. FEELING DOWN, DEPRESSED OR HOPELESS: 0
SUM OF ALL RESPONSES TO PHQ QUESTIONS 1-9: 0
SUM OF ALL RESPONSES TO PHQ9 QUESTIONS 1 & 2: 0

## 2020-07-17 NOTE — PROGRESS NOTES
status post stent. Patient is compliant with diet and medication,she does not have any complains     Review of Systems   Constitutional: Negative for activity change, appetite change, chills, diaphoresis, fatigue and fever. HENT: Negative for congestion, dental problem, drooling and ear discharge. Eyes: Negative for pain, discharge, redness and itching. Respiratory: Negative for apnea, cough, choking, chest tightness and shortness of breath. Cardiovascular: Negative for chest pain and leg swelling. Gastrointestinal: Negative for abdominal distention, abdominal pain, blood in stool, constipation and diarrhea. Endocrine: Negative for cold intolerance and heat intolerance. Genitourinary: Negative for difficulty urinating, dysuria, enuresis, flank pain and frequency. Musculoskeletal: Negative for arthralgias, back pain, gait problem and joint swelling. Skin: Negative for color change, pallor and rash. Neurological: Negative for dizziness, facial asymmetry, light-headedness, numbness and headaches. Psychiatric/Behavioral: Negative for agitation, behavioral problems, confusion, decreased concentration and dysphoric mood. Objective:   Physical Exam  Constitutional:       Appearance: She is well-developed. She is not diaphoretic. HENT:      Head: Normocephalic and atraumatic. Mouth/Throat:      Pharynx: No oropharyngeal exudate. Eyes:      General: No scleral icterus. Right eye: No discharge. Left eye: No discharge. Conjunctiva/sclera: Conjunctivae normal.      Pupils: Pupils are equal, round, and reactive to light. Neck:      Musculoskeletal: Normal range of motion and neck supple. Thyroid: No thyromegaly. Vascular: No JVD. Trachea: No tracheal deviation. Cardiovascular:      Rate and Rhythm: Normal rate. Heart sounds: Normal heart sounds. No murmur. No gallop. Pulmonary:      Effort: Pulmonary effort is normal. No respiratory distress. Breath sounds: Normal breath sounds. No stridor. No wheezing or rales. Chest:      Chest wall: No tenderness. Abdominal:      General: Bowel sounds are normal. There is no distension. Palpations: Abdomen is soft. Tenderness: There is no abdominal tenderness. There is no guarding or rebound. Musculoskeletal: Normal range of motion. Neurological:      Mental Status: She is alert and oriented to person, place, and time. Assessment / Plan:     Social History     Socioeconomic History    Marital status:       Spouse name: Not on file    Number of children: Not on file    Years of education: Not on file    Highest education level: Not on file   Occupational History    Not on file   Social Needs    Financial resource strain: Not on file    Food insecurity     Worry: Not on file     Inability: Not on file    Transportation needs     Medical: Not on file     Non-medical: Not on file   Tobacco Use    Smoking status: Never Smoker    Smokeless tobacco: Never Used   Substance and Sexual Activity    Alcohol use: No     Alcohol/week: 0.0 standard drinks    Drug use: No    Sexual activity: Not on file   Lifestyle    Physical activity     Days per week: Not on file     Minutes per session: Not on file    Stress: Not on file   Relationships    Social connections     Talks on phone: Not on file     Gets together: Not on file     Attends Judaism service: Not on file     Active member of club or organization: Not on file     Attends meetings of clubs or organizations: Not on file     Relationship status: Not on file    Intimate partner violence     Fear of current or ex partner: Not on file     Emotionally abused: Not on file     Physically abused: Not on file     Forced sexual activity: Not on file   Other Topics Concern    Not on file   Social History Narrative    Not on file        Family History   Problem Relation Age of Onset    Cancer Mother         lymphoma    Prostate Cancer Brother     Breast Cancer Sister       1. Essential hypertension  Controlled   - Comprehensive Metabolic Panel; Future  - CBC; Future    2. Mixed hyperlipidemia  On Lipitor, TriCor    3. Coronary artery disease involving native coronary artery of native heart without angina pectoris  - Comprehensive Metabolic Panel; Future    4. Age related osteoporosis, unspecified pathological fracture presence  - DEXA BONE DENSITY 2 SITES; Future      · Return in about 4 months (around 11/17/2020). · Reviewed prior labs and health maintenance. · Discussed use, benefit, and side effects of prescribed medications. Barriers to medication compliance addressed. All patient questions answered. Pt voiced understanding. Savannah Evans MD  [facility]  7/23/2020, 8:49 PM    Please note that this chart was generated using voice recognition Dragon dictation software. Although every effort was made to ensure the accuracy of this automated transcription, some errors in transcription may have occurred.

## 2020-07-20 ENCOUNTER — HOSPITAL ENCOUNTER (OUTPATIENT)
Age: 75
Setting detail: SPECIMEN
Discharge: HOME | End: 2020-07-20
Payer: MEDICARE

## 2020-07-20 LAB
ALBUMIN SERPL-MCNC: 4.5 G/DL (ref 3.5–5.2)
ALBUMIN/GLOBULIN RATIO: 1.9 (ref 1–2.5)
ALP BLD-CCNC: 51 U/L (ref 35–104)
ALT SERPL-CCNC: 19 U/L (ref 5–33)
ANION GAP SERPL CALCULATED.3IONS-SCNC: 15 MMOL/L (ref 9–17)
AST SERPL-CCNC: 24 U/L
BILIRUB SERPL-MCNC: 0.42 MG/DL (ref 0.3–1.2)
BUN BLDV-MCNC: 14 MG/DL (ref 8–23)
BUN/CREAT BLD: ABNORMAL (ref 9–20)
CALCIUM SERPL-MCNC: 10.5 MG/DL (ref 8.6–10.4)
CHLORIDE BLD-SCNC: 96 MMOL/L (ref 98–107)
CO2: 24 MMOL/L (ref 20–31)
CREAT SERPL-MCNC: 0.88 MG/DL (ref 0.5–0.9)
GFR AFRICAN AMERICAN: >60 ML/MIN
GFR NON-AFRICAN AMERICAN: >60 ML/MIN
GFR SERPL CREATININE-BSD FRML MDRD: ABNORMAL ML/MIN/{1.73_M2}
GFR SERPL CREATININE-BSD FRML MDRD: ABNORMAL ML/MIN/{1.73_M2}
GLUCOSE BLD-MCNC: 78 MG/DL (ref 70–99)
HCT VFR BLD CALC: 40.9 % (ref 36.3–47.1)
HEMOGLOBIN: 13.3 G/DL (ref 11.9–15.1)
MCH RBC QN AUTO: 28.4 PG (ref 25.2–33.5)
MCHC RBC AUTO-ENTMCNC: 32.5 G/DL (ref 28.4–34.8)
MCV RBC AUTO: 87.2 FL (ref 82.6–102.9)
NRBC AUTOMATED: 0 PER 100 WBC
PDW BLD-RTO: 12.6 % (ref 11.8–14.4)
PLATELET # BLD: 272 K/UL (ref 138–453)
PMV BLD AUTO: 10.3 FL (ref 8.1–13.5)
POTASSIUM SERPL-SCNC: 4.1 MMOL/L (ref 3.7–5.3)
RBC # BLD: 4.69 M/UL (ref 3.95–5.11)
SODIUM BLD-SCNC: 135 MMOL/L (ref 135–144)
TOTAL PROTEIN: 6.9 G/DL (ref 6.4–8.3)
WBC # BLD: 7.6 K/UL (ref 3.5–11.3)

## 2020-07-23 ASSESSMENT — ENCOUNTER SYMPTOMS
ABDOMINAL PAIN: 0
BLOOD IN STOOL: 0
COUGH: 0
EYE REDNESS: 0
COLOR CHANGE: 0
SHORTNESS OF BREATH: 0
CHOKING: 0
ABDOMINAL DISTENTION: 0
CHEST TIGHTNESS: 0
CONSTIPATION: 0
EYE DISCHARGE: 0
DIARRHEA: 0
EYE PAIN: 0
BACK PAIN: 0
APNEA: 0
EYE ITCHING: 0

## 2020-08-05 ENCOUNTER — TELEPHONE (OUTPATIENT)
Dept: INTERNAL MEDICINE CLINIC | Age: 75
End: 2020-08-05

## 2020-08-05 ENCOUNTER — HOSPITAL ENCOUNTER (OUTPATIENT)
Dept: WOMENS IMAGING | Age: 75
Discharge: HOME | End: 2020-08-07
Payer: MEDICARE

## 2020-08-05 PROCEDURE — 77080 DXA BONE DENSITY AXIAL: CPT

## 2020-08-05 RX ORDER — ALENDRONATE SODIUM 70 MG/1
70 TABLET ORAL
Qty: 4 TABLET | Refills: 3 | Status: SHIPPED | OUTPATIENT
Start: 2020-08-05 | End: 2020-11-23

## 2020-08-05 NOTE — TELEPHONE ENCOUNTER
Pt return Felisa's phone call regarding dexca scan results. Please clarify directions for fosamax  Current directions are to   Take 1 tablet by mouth every 7 days w/ 4 refills. Please advise.

## 2020-08-05 NOTE — TELEPHONE ENCOUNTER
Yes, this medication need to be taking once a week  We should send 4 tablets with 3 refills  Patient need to be sitting at least half a hour after taking this medication

## 2020-09-04 RX ORDER — FENOFIBRATE 48 MG/1
48 TABLET, COATED ORAL DAILY
Qty: 30 TABLET | Refills: 0 | Status: SHIPPED | OUTPATIENT
Start: 2020-09-04 | End: 2020-10-05

## 2020-09-04 NOTE — TELEPHONE ENCOUNTER
Medication:  Fenofibrate  Last visit: 7/17/2020  Next visit: 11/30/2020  Last refill: 9/6/18  [facility]

## 2020-10-05 RX ORDER — FENOFIBRATE 48 MG/1
TABLET, COATED ORAL
Qty: 30 TABLET | Refills: 0 | Status: SHIPPED | OUTPATIENT
Start: 2020-10-05

## 2020-11-23 RX ORDER — ALENDRONATE SODIUM 70 MG/1
TABLET ORAL
Qty: 12 TABLET | Refills: 2 | Status: SHIPPED | OUTPATIENT
Start: 2020-11-23 | End: 2021-08-02

## 2020-11-30 ENCOUNTER — OFFICE VISIT (OUTPATIENT)
Dept: INTERNAL MEDICINE CLINIC | Age: 75
End: 2020-11-30
Payer: MEDICARE

## 2020-11-30 VITALS
RESPIRATION RATE: 14 BRPM | SYSTOLIC BLOOD PRESSURE: 136 MMHG | DIASTOLIC BLOOD PRESSURE: 80 MMHG | WEIGHT: 156 LBS | BODY MASS INDEX: 30.63 KG/M2 | HEIGHT: 60 IN | TEMPERATURE: 96.4 F | HEART RATE: 76 BPM

## 2020-11-30 PROCEDURE — 99214 OFFICE O/P EST MOD 30 MIN: CPT | Performed by: INTERNAL MEDICINE

## 2020-11-30 ASSESSMENT — PATIENT HEALTH QUESTIONNAIRE - PHQ9
SUM OF ALL RESPONSES TO PHQ QUESTIONS 1-9: 0
SUM OF ALL RESPONSES TO PHQ QUESTIONS 1-9: 0
2. FEELING DOWN, DEPRESSED OR HOPELESS: 0
SUM OF ALL RESPONSES TO PHQ QUESTIONS 1-9: 0
1. LITTLE INTEREST OR PLEASURE IN DOING THINGS: 0
SUM OF ALL RESPONSES TO PHQ9 QUESTIONS 1 & 2: 0

## 2020-11-30 NOTE — PROGRESS NOTES
Subjective:      Patient ID: Giuliano Garcia is a 66 y.o. female. HPI  Visit Information    Have you changed or started any medications since your last visit including any over-the-counter medicines, vitamins, or herbal medicines? no   Are you having any side effects from any of your medications? -  no  Have you stopped taking any of your medications? Is so, why? -  no    Have you seen any other physician or provider since your last visit? yes  Have you had any other diagnostic tests since your last visit? No  Have you been seen in the emergency room and/or had an admission to a hospital since we last saw you? No  Have you had your routine dental cleaning in the past 6 months? yes     Have you activated your [employer] account? If not, what are your barriers? Yes     Patient Care Team:  Rl Ortiz MD as PCP - General (Internal Medicine)  Rl Ortiz MD as PCP - [facility]  Chel Gates MD (Gastroenterology)  Srikanth Ley MD as Referring Physician (Cardiology)  Jose A Mancera MD as Surgeon (Cardiothoracic Surgery)    Medical History Review  Past Medical, Family, and Social History reviewed and does contribute to the patient presenting condition    Health Maintenance   Topic Date Due    DTaP/Tdap/Td vaccine (1 - Tdap) 02/17/1962    Shingles Vaccine (1 of 2) 02/17/1993    Pneumococcal 65+ years Vaccine (1 of 1 - PPSV23) 02/17/2008    Annual Wellness Visit (AWV)  06/03/2019    Flu vaccine (1) 09/01/2020    Lipid screen  05/19/2021    Potassium monitoring  07/20/2021    Creatinine monitoring  07/20/2021    DEXA (modify frequency per FRAX score)  Addressed    Hepatitis A vaccine  Aged Out    Hepatitis B vaccine  Aged Out    Hib vaccine  Aged Out    Meningococcal (ACWY) vaccine  Aged Out     Chief Complaint   Patient presents with    Follow-up     Pt is here for 4 month follow up and denies any other concerns at this time.      Patient is here for evaluation multiple medical problems. She has hypertension, hyperlipidemia, coronary artery disease status post stent. Patient is compliant with diet and medication,she does not have any complains . STARTED ON fosamax recently with osteopenia , FRAX score was 11 percent for major fracture , her DEXA scan is worse from 2017       Review of Systems   Constitutional: Negative for activity change, appetite change, chills, diaphoresis, fatigue and fever. HENT: Negative for congestion, dental problem, drooling and ear discharge. Eyes: Negative for pain, discharge, redness and itching. Respiratory: Negative for apnea, cough, choking, chest tightness and shortness of breath. Cardiovascular: Negative for chest pain and leg swelling. Gastrointestinal: Negative for abdominal distention, abdominal pain, blood in stool, constipation and diarrhea. Endocrine: Negative for cold intolerance and heat intolerance. Genitourinary: Negative for difficulty urinating, dysuria, enuresis, flank pain and frequency. Musculoskeletal: Positive for arthralgias (both Knees ). Negative for back pain, gait problem and joint swelling. Skin: Negative for color change, pallor and rash. Neurological: Negative for dizziness, facial asymmetry, light-headedness, numbness and headaches. Psychiatric/Behavioral: Negative for agitation, behavioral problems, confusion, decreased concentration and dysphoric mood. Objective:   Physical Exam  Constitutional:       Appearance: She is well-developed. She is not diaphoretic. HENT:      Head: Normocephalic and atraumatic. Mouth/Throat:      Pharynx: No oropharyngeal exudate. Eyes:      General: No scleral icterus. Right eye: No discharge. Left eye: No discharge. Conjunctiva/sclera: Conjunctivae normal.      Pupils: Pupils are equal, round, and reactive to light. Neck:      Musculoskeletal: Normal range of motion and neck supple. Thyroid: No thyromegaly. Vascular: No JVD. Trachea: No tracheal deviation. Cardiovascular:      Rate and Rhythm: Normal rate. Heart sounds: Normal heart sounds. No murmur. No gallop. Pulmonary:      Effort: Pulmonary effort is normal. No respiratory distress. Breath sounds: Normal breath sounds. No stridor. No wheezing or rales. Chest:      Chest wall: No tenderness. Abdominal:      General: Bowel sounds are normal. There is no distension. Palpations: Abdomen is soft. Tenderness: There is no abdominal tenderness. There is no guarding or rebound. Musculoskeletal: Normal range of motion. Neurological:      Mental Status: She is alert and oriented to person, place, and time. Assessment / Plan:   1. Follow-up exam, 3-6 months since previous exam      2. NSTEMI (non-ST elevated myocardial infarction) (HCC)  Stable   On PLavix, lipitor, Coreg     3. Failure of total knee replacement, subsequent encounter    4. Healthcare maintenance      5. Need for influenza vaccination    6. Osteoporosis - on Fosamax, Calcium/Vitamin D     7 . HTN - Controlled       · No follow-ups on file. · Reviewed prior labs and health maintenance. · Discussed use, benefit, and side effects of prescribed medications. Barriers to medication compliance addressed. All patient questions answered. Pt voiced understanding. MD JOCELYN Da Silva [facility]  12/8/2020, 1:32 PM    Please note that this chart was generated using voice recognition Dragon dictation software. Although every effort was made to ensure the accuracy of this automated transcription, some errors in transcription may have occurred.

## 2020-12-08 ASSESSMENT — ENCOUNTER SYMPTOMS
BLOOD IN STOOL: 0
CHOKING: 0
EYE DISCHARGE: 0
COUGH: 0
APNEA: 0
EYE ITCHING: 0
CONSTIPATION: 0
SHORTNESS OF BREATH: 0
DIARRHEA: 0
ABDOMINAL DISTENTION: 0
CHEST TIGHTNESS: 0
EYE PAIN: 0
ABDOMINAL PAIN: 0
EYE REDNESS: 0
BACK PAIN: 0
COLOR CHANGE: 0

## 2020-12-23 ENCOUNTER — HOSPITAL ENCOUNTER (INPATIENT)
Age: 75
LOS: 1 days | Discharge: HOME | DRG: 084 | End: 2020-12-24
Attending: EMERGENCY MEDICINE | Admitting: SURGERY
Payer: MEDICARE

## 2020-12-23 ENCOUNTER — HOSPITAL ENCOUNTER (EMERGENCY)
Age: 75
Discharge: ANOTHER HOSPITAL | End: 2020-12-23
Attending: EMERGENCY MEDICINE
Payer: MEDICARE

## 2020-12-23 ENCOUNTER — APPOINTMENT (OUTPATIENT)
Dept: CT IMAGING | Age: 75
End: 2020-12-23
Payer: MEDICARE

## 2020-12-23 ENCOUNTER — APPOINTMENT (OUTPATIENT)
Dept: GENERAL RADIOLOGY | Age: 75
End: 2020-12-23
Payer: MEDICARE

## 2020-12-23 VITALS
RESPIRATION RATE: 22 BRPM | HEART RATE: 71 BPM | DIASTOLIC BLOOD PRESSURE: 79 MMHG | HEIGHT: 61 IN | SYSTOLIC BLOOD PRESSURE: 158 MMHG | BODY MASS INDEX: 29.64 KG/M2 | OXYGEN SATURATION: 96 % | WEIGHT: 157 LBS

## 2020-12-23 PROBLEM — I60.9 SAH (SUBARACHNOID HEMORRHAGE) (HCC): Status: ACTIVE | Noted: 2020-12-23

## 2020-12-23 LAB
ABSOLUTE EOS #: 0.1 K/UL (ref 0–0.4)
ABSOLUTE IMMATURE GRANULOCYTE: ABNORMAL K/UL (ref 0–0.3)
ABSOLUTE LYMPH #: 2 K/UL (ref 1–4.8)
ABSOLUTE MONO #: 0.8 K/UL (ref 0.1–1.3)
ALBUMIN SERPL-MCNC: 4.4 G/DL (ref 3.5–5.2)
ALBUMIN/GLOBULIN RATIO: ABNORMAL (ref 1–2.5)
ALP BLD-CCNC: 56 U/L (ref 35–104)
ALT SERPL-CCNC: 16 U/L (ref 5–33)
ANION GAP SERPL CALCULATED.3IONS-SCNC: 11 MMOL/L (ref 9–17)
AST SERPL-CCNC: 21 U/L
BASOPHILS # BLD: 1 % (ref 0–2)
BASOPHILS ABSOLUTE: 0 K/UL (ref 0–0.2)
BILIRUB SERPL-MCNC: 0.22 MG/DL (ref 0.3–1.2)
BUN BLDV-MCNC: 17 MG/DL (ref 8–23)
BUN/CREAT BLD: ABNORMAL (ref 9–20)
CALCIUM SERPL-MCNC: 10.9 MG/DL (ref 8.6–10.4)
CHLORIDE BLD-SCNC: 102 MMOL/L (ref 98–107)
CO2: 27 MMOL/L (ref 20–31)
CREAT SERPL-MCNC: 0.83 MG/DL (ref 0.5–0.9)
DIFFERENTIAL TYPE: ABNORMAL
EOSINOPHILS RELATIVE PERCENT: 1 % (ref 0–4)
GFR AFRICAN AMERICAN: >60 ML/MIN
GFR NON-AFRICAN AMERICAN: >60 ML/MIN
GFR SERPL CREATININE-BSD FRML MDRD: ABNORMAL ML/MIN/{1.73_M2}
GFR SERPL CREATININE-BSD FRML MDRD: ABNORMAL ML/MIN/{1.73_M2}
GLUCOSE BLD-MCNC: 111 MG/DL (ref 70–99)
HCT VFR BLD CALC: 38.6 % (ref 36–46)
HEMOGLOBIN: 13.1 G/DL (ref 12–16)
IMMATURE GRANULOCYTES: ABNORMAL %
INR BLD: 1
LYMPHOCYTES # BLD: 19 % (ref 24–44)
MCH RBC QN AUTO: 28.7 PG (ref 26–34)
MCHC RBC AUTO-ENTMCNC: 34 G/DL (ref 31–37)
MCV RBC AUTO: 84.5 FL (ref 80–100)
MONOCYTES # BLD: 8 % (ref 1–7)
NRBC AUTOMATED: ABNORMAL PER 100 WBC
PARTIAL THROMBOPLASTIN TIME: 33 SEC (ref 24–36)
PDW BLD-RTO: 13.2 % (ref 11.5–14.9)
PLATELET # BLD: 271 K/UL (ref 150–450)
PLATELET ESTIMATE: ABNORMAL
PMV BLD AUTO: 7.7 FL (ref 6–12)
POTASSIUM SERPL-SCNC: 3.9 MMOL/L (ref 3.7–5.3)
PROTHROMBIN TIME: 12.6 SEC (ref 11.8–14.6)
RBC # BLD: 4.56 M/UL (ref 4–5.2)
RBC # BLD: ABNORMAL 10*6/UL
SARS-COV-2, RAPID: NOT DETECTED
SARS-COV-2: NORMAL
SARS-COV-2: NORMAL
SEG NEUTROPHILS: 71 % (ref 36–66)
SEGMENTED NEUTROPHILS ABSOLUTE COUNT: 7.5 K/UL (ref 1.3–9.1)
SODIUM BLD-SCNC: 140 MMOL/L (ref 135–144)
SOURCE: NORMAL
TOTAL PROTEIN: 7.5 G/DL (ref 6.4–8.3)
WBC # BLD: 10.4 K/UL (ref 3.5–11)
WBC # BLD: ABNORMAL 10*3/UL

## 2020-12-23 PROCEDURE — G0378 HOSPITAL OBSERVATION PER HR: HCPCS

## 2020-12-23 PROCEDURE — 85025 COMPLETE CBC W/AUTO DIFF WBC: CPT

## 2020-12-23 PROCEDURE — 99285 EMERGENCY DEPT VISIT HI MDM: CPT

## 2020-12-23 PROCEDURE — 2060000000 HC ICU INTERMEDIATE R&B

## 2020-12-23 PROCEDURE — 93005 ELECTROCARDIOGRAM TRACING: CPT | Performed by: STUDENT IN AN ORGANIZED HEALTH CARE EDUCATION/TRAINING PROGRAM

## 2020-12-23 PROCEDURE — 80053 COMPREHEN METABOLIC PANEL: CPT

## 2020-12-23 PROCEDURE — 85610 PROTHROMBIN TIME: CPT

## 2020-12-23 PROCEDURE — U0003 INFECTIOUS AGENT DETECTION BY NUCLEIC ACID (DNA OR RNA); SEVERE ACUTE RESPIRATORY SYNDROME CORONAVIRUS 2 (SARS-COV-2) (CORONAVIRUS DISEASE [COVID-19]), AMPLIFIED PROBE TECHNIQUE, MAKING USE OF HIGH THROUGHPUT TECHNOLOGIES AS DESCRIBED BY CMS-2020-01-R: HCPCS

## 2020-12-23 PROCEDURE — 85730 THROMBOPLASTIN TIME PARTIAL: CPT

## 2020-12-23 PROCEDURE — U0002 COVID-19 LAB TEST NON-CDC: HCPCS

## 2020-12-23 PROCEDURE — 2580000003 HC RX 258: Performed by: STUDENT IN AN ORGANIZED HEALTH CARE EDUCATION/TRAINING PROGRAM

## 2020-12-23 PROCEDURE — 36415 COLL VENOUS BLD VENIPUNCTURE: CPT

## 2020-12-23 PROCEDURE — 70450 CT HEAD/BRAIN W/O DYE: CPT

## 2020-12-23 PROCEDURE — 73502 X-RAY EXAM HIP UNI 2-3 VIEWS: CPT

## 2020-12-23 PROCEDURE — 6370000000 HC RX 637 (ALT 250 FOR IP): Performed by: STUDENT IN AN ORGANIZED HEALTH CARE EDUCATION/TRAINING PROGRAM

## 2020-12-23 RX ORDER — CARVEDILOL 12.5 MG/1
12.5 TABLET ORAL 2 TIMES DAILY
Status: DISCONTINUED | OUTPATIENT
Start: 2020-12-23 | End: 2020-12-24 | Stop reason: HOSPADM

## 2020-12-23 RX ORDER — ATORVASTATIN CALCIUM 20 MG/1
20 TABLET, FILM COATED ORAL DAILY
Status: DISCONTINUED | OUTPATIENT
Start: 2020-12-23 | End: 2020-12-24 | Stop reason: HOSPADM

## 2020-12-23 RX ORDER — SODIUM CHLORIDE 0.9 % (FLUSH) 0.9 %
10 SYRINGE (ML) INJECTION EVERY 12 HOURS SCHEDULED
Status: DISCONTINUED | OUTPATIENT
Start: 2020-12-23 | End: 2020-12-24 | Stop reason: HOSPADM

## 2020-12-23 RX ORDER — ONDANSETRON 2 MG/ML
4 INJECTION INTRAMUSCULAR; INTRAVENOUS EVERY 6 HOURS PRN
Status: DISCONTINUED | OUTPATIENT
Start: 2020-12-23 | End: 2020-12-24 | Stop reason: HOSPADM

## 2020-12-23 RX ORDER — POLYETHYLENE GLYCOL 3350 17 G/17G
17 POWDER, FOR SOLUTION ORAL DAILY
Status: DISCONTINUED | OUTPATIENT
Start: 2020-12-23 | End: 2020-12-24 | Stop reason: HOSPADM

## 2020-12-23 RX ORDER — SODIUM CHLORIDE 0.9 % (FLUSH) 0.9 %
10 SYRINGE (ML) INJECTION PRN
Status: DISCONTINUED | OUTPATIENT
Start: 2020-12-23 | End: 2020-12-24 | Stop reason: HOSPADM

## 2020-12-23 RX ORDER — ACETAMINOPHEN 325 MG/1
650 TABLET ORAL EVERY 4 HOURS PRN
Status: DISCONTINUED | OUTPATIENT
Start: 2020-12-23 | End: 2020-12-24 | Stop reason: HOSPADM

## 2020-12-23 RX ORDER — DOCUSATE SODIUM 100 MG/1
100 CAPSULE, LIQUID FILLED ORAL 2 TIMES DAILY
Status: DISCONTINUED | OUTPATIENT
Start: 2020-12-23 | End: 2020-12-24 | Stop reason: HOSPADM

## 2020-12-23 RX ADMIN — ATORVASTATIN CALCIUM 20 MG: 20 TABLET, FILM COATED ORAL at 20:42

## 2020-12-23 RX ADMIN — Medication 10 ML: at 20:43

## 2020-12-23 RX ADMIN — CARVEDILOL 12.5 MG: 12.5 TABLET, FILM COATED ORAL at 20:42

## 2020-12-23 RX ADMIN — ACETAMINOPHEN 650 MG: 325 TABLET ORAL at 22:17

## 2020-12-23 ASSESSMENT — PAIN DESCRIPTION - LOCATION: LOCATION: HIP

## 2020-12-23 ASSESSMENT — ENCOUNTER SYMPTOMS
DIARRHEA: 0
EYE PAIN: 0
SHORTNESS OF BREATH: 0
BACK PAIN: 0
CONSTIPATION: 0
BACK PAIN: 1
ABDOMINAL PAIN: 0
COLOR CHANGE: 0
ABDOMINAL PAIN: 0
EYE PAIN: 0
SORE THROAT: 0
COUGH: 0
RHINORRHEA: 0
SHORTNESS OF BREATH: 0

## 2020-12-23 ASSESSMENT — PAIN SCALES - GENERAL
PAINLEVEL_OUTOF10: 7
PAINLEVEL_OUTOF10: 8

## 2020-12-23 ASSESSMENT — PAIN DESCRIPTION - ORIENTATION: ORIENTATION: RIGHT

## 2020-12-23 NOTE — ED NOTES
.. 12/23/2020 5:46 PM    Patient: Frida Richards, 66 y.o., female Race: Caucasion  Patient Address: [address]    Sending Facility:  [] [facility]  [] [facility]  [x] [facility]  [] [facility]  [] [facility]  [] Other:    Sending Physician: Dr. Wen Tesfaye     Patient Phone #: [phone]   Insurance: Payor: [employer] /  /  /   Wander Rae:  []  Yes   [x]  No  Emergency Contact: Extended Emergency Contact Information  Primary Emergency Contact: [address] [place] [address] Phone: [phone]  Mobile Phone: [phone]  Relation: Child  MRN: [id number]    YOB: 1945  Primary Care Physician: Steve Whitman MD  Advance Directive: [x] Full Code   []DNR-CC   []DNR-CCA    [facility] Crew: [facility], [address]    Receiving Facility:  [x] [facility]  [] [facility]  [] [facility]  [] Other:  Receiving Physician: Dr. Oumou Parisi    Reason for transfer:   [x]   Speciality care required, not available at sending facility   []   Pt/family request   []   Physician request    []   Other:    Response Code   [] 2  [x] 3  []   Change  [] 2  [] 3   Transport Code   [] 2  [x] 3  []   Change  [] 2  [] 3     Call Received 12/23/2020 1608   Dispatched 12/23/2020 [phone] 12/23/2020 1621   Arrived Scene 12/23/2020 1622   At Patient 12/23/2020 1628   [facility] 12/23/2020 1653   Departed Hospital 12/23/2020 1718   In Service 12/23/2020 1718     Mileage:  [facility] [id number]   Total Miles 6.0       Allergies  has No Known Allergies. Medications  Prior to Admission medications    Medication Sig Start Date End Date Taking? Authorizing Provider   alendronate (FOSAMAX) 70 MG tablet TAKE 1 TABLET BY MOUTH ONCE WEEKLY ON AN EMPTY STOMACH BEFORE BREAKFAST.  REMAIN UPRIGHT FOR 30 MINUTES & TAKE WITH 8 OUNCES OF WATER 11/23/20   Steve Whitman MD   fenofibrate (TRICOR) 48 MG tablet TAKE ONE TABLET BY MOUTH DAILY 10/5/20   Allan Emmanuel Biggs MD   clopidogrel (PLAVIX) 75 MG tablet Take 75 mg by mouth daily    Historical Provider, MD   hydrochlorothiazide (HYDRODIURIL) 12.5 MG tablet Take 12.5 mg by mouth daily    Historical Provider, MD   carvedilol (COREG) 12.5 MG tablet Take 1 tablet by mouth 2 times daily 4/13/20   Gianna Ontiveros MD   atorvastatin (LIPITOR) 20 MG tablet Take 1 tablet by mouth daily 10/8/19   Shelly Vora MD   nitroGLYCERIN (NITROSTAT) 0.4 MG SL tablet Place 1 tablet under the tongue every 5 minutes as needed for Chest pain up to max of 3 total doses. If no relief after 1 dose, call 911. 7/3/18   Shelly Vora MD   aspirin 81 MG EC tablet Take 1 tablet by mouth daily 12/25/15   Galina Caceres MD   Vitamin D (CHOLECALCIFEROL) 1000 UNITS CAPS capsule Take 1,000 Units by mouth daily. Historical Provider, MD   Multiple Vitamins-Minerals (THERAPEUTIC MULTIVITAMIN-MINERALS) tablet Take 1 tablet by mouth daily. Historical Provider, MD   Calcium Carbonate-Vitamin D (CALCIUM + D PO) Take 500 mg by mouth daily. Historical Provider, MD   ascorbic acid (VITAMIN C) 500 MG tablet Take 500 mg by mouth daily. Historical Provider, MD   acetaminophen (TYLENOL) 325 MG tablet Take 650 mg by mouth every 6 hours as needed. Historical Provider, MD    Scheduled Meds:  Continuous Infusions:  PRN Meds:.  Past Medical History   has a past medical history of Diverticulosis of colon, Full dentures, History of colon polyps, Hyperlipidemia, Hypertension, Osteoarthritis, Osteoarthritis of hand, Osteoarthritis of lower leg, localized, Osteoporosis, Psoriasis, Sinus congestion, Tubulovillous adenoma of colon, and Wears glasses.     Patient Weight: 170 lbs   [x]   Estimated   []   Stated        VITALS          Time BP Pulse   Resp  Rate N-normal  S-shallow  D-deep Monitor SpO2 O2    LPM BGL   Temp Pain   1640 154/86 67 23 N Irregular 97% RA  96.7 3   1650 156/89 68 24 N Irregular 98% RA                                                Pupils GCS   Time R L E  4 V  5 M  6 T  15   1640 3 3 4 5 6 15                                         NIH - record on all stroke transports and Q15 min after tPA administration    NIHSS       Time 1640      1a. LOC - Arousal Status 0      1b. LOC - Questions 0      1c. LOC - Commands 0      2. Eye Movements (gaze) 0      3. Visual Fields 0      4. Facial Palsy 0      5a. Motor Left Arm 0      5b. Motor Right Arm 0      6a. Motor Left Leg 0      6b. Motor Right Leg 0      7. Limb Ataxia 0      8. Sensory 0      9. Language/Aphasia 0      10. Dysarthria 0      11. Neglect 0      TOTAL 0        Research:    RACE Score: 0 Time: 1640  StrokeVAN Score: NEG Time: 1640    Time Last Known Well: 1100 12/23/2020    Narrative:    Dispatched to inter-facility transfer by [facility]. Arrived to find Aida Guillen, 66 y.o., female c/o fall from standing position at 11am today. Report received from Dr. Rj Ford at patients side. HPI - At time of [facility] arrival, pt in restroom. Pt ambulates to cot without assist using her cane. Pt awake alert and oriented times 4. Pt is fully clothed. Pt reports feeling a little dizzy after ambulation but denies blurry vision. Pt reports she was carrying her groceries into her home around 11am this morning when she was walking up her 2 front steps on her porch. She leaned down to grab her bag and fell backwards hitting her head on the concrete. Pt states she is unsure of loss of consciousness but only recalls waking and being on the ground. Pt was able to get self to standing position and get self into house. She made herself lunch and then called her daughter. Her daughter states she did not get a phone call until approx 1pm. Pt had a lump to the right side of her head. No laceration noted. No Neuro deficits noted. Pt also c/o right hip pain. She walks with a cane but states the pain to her hip is new. Xrays were done to right hip and per physician report neg for fracture.  Unable to visualize area due to pt wearing sweat pants. CT scan done of head showed SAH. Per ER physician, cervical spine cleared as pt did not c/o neck tenderness. Pt is being transferred to [facility] for Neurosurgery Consult that is not available at referring facility. Pt is being transported by [facility] due to no other transport services available. Patient received the following medications prior to [facility] arrival: NONE  Patient has the following lines/drains prior to [facility] arrival: 18g IV RT [facility]  Patient has the following airway placed prior to [facility] arrival: NONE    Patient was transferred to cot via 4 person sheet lift and secured with straps x3. Zoll ECG, SpO2, and NIBP applied and monitored throughout encounter. HOB maintained at 30 degrees. Patient is being transported to [facility] for tertiary Neuro and Trauma care unavailable at sending hospital.   During transport patient rests comfortably. Cabin temp maintained at 70-72 °F throughout transport. Patient was transferred to bed 26 via 4 person sheet lift. Report, care, and all paperwork from sending facility turned over to [employer] at bedside. Physical assessment performed:    Neuro: No neuro deficits noted.     Resp: lungs clear to ascultation bilaterally, normal effort  Cardiac: irregular rate, no murmur  Muscular/skeletal/peripheral vascular: no edema, no redness or tenderness in the calves, pulses present in 4 extremities   Abd/GI/: abd flat, soft, non tender  Skin: normal color, warm, dry      IV Lines:  Time Type Site/Route Size # Attempts Performed By   PTA INT RT AC 18g UNKNOWN [facility] ED                             Total Amount of IV Fluids Infused: Mariatal 2 Notification:    Report called to -  [x] [facility]  [] [facility]  [] [facility]  [] Other:     [x]    Med Channel:     []    Cell Phone     Med Control Orders Received:   [x] No  []  Yes:     Med Control Physician (if on line medical direction received)  -      Bedside Report Given To:  Hudson Johnson  [ramy]  DOMINIC   []

## 2020-12-23 NOTE — H&P
TRAUMA HISTORY AND PHYSICAL EXAMINATION  (V 2.0)    PATIENT NAME: Colt Clifford  YOB: 1945  MEDICAL RECORD NO. [id number]   DATE: 12/23/2020  PRIMARY CARE PHYSICIAN: Sebastian Campuzano MD  PATIENT EVALUATED AT THE REQUEST OF :   Aneta Workman    ACTIVATION   []Trauma Alert     [] Trauma Priority     [x]Trauma Consult. IMPRESSION:     Patient Active Problem List   Diagnosis    Hypertension    Hyperlipidemia    Tubulovillous adenoma of colon    Psoriasis    Osteoporosis    Osteoarthritis of hand    Osteoarthritis of lower leg, localized    Failure of total knee arthroplasty ([facility] [address]    S/P total knee replacement    Osteoarthritis of lumbar spine    DJD (degenerative joint disease) of thoracic spine    Thyroid nodule    Hypokalemia    NSTEMI (non-ST elevated myocardial infarction) (HCC)    History of colon polyps    Syncope and collapse    CAD (coronary artery disease)    Stented coronary artery    Acute bilateral low back pain without sciatica     · Fall from standing height  · [facility]    MEDICAL DECISION MAKING AND PLAN:     · Admit to stepdown unit  1. Neurologically intact  2. Can have a general diet  3. Not endorsing much pain, PRN tylenol, can add additional PRNs if needed  4. Hold AC/AP  5. Repeat CTH in AM  6. Follow up EKG, orthostatics    CONSULT SERVICES    [x] Neurosurgery     [] Orthopedic Surgery    [] Cardiothoracic     [] Facial Trauma    [] Plastic Surgery (Burn)    [] Pediatric Surgery     [] Internal Medicine    [] Pulmonary Medicine    [] Other:       HISTORY:     SOURCE OF INFORMATION  Patient information was obtained from patient. History/Exam limitations: none. INJURY SUMMARY  SAH 9x4mm    GENERAL DATA  Age 66 y.o.  female   Patient information was obtained from patient. History/Exam limitations: none.   Patient presented to the Emergency Department by ambulance  Injury Date: 12/23/2020   Approximate Injury Time:        Transport mode:   [x]Ambulance      [] Helicopter []Car       [] Other  Referring Hospital: [address] , (e.g., home, farm, industry, street)  Specific Details of Location (e.g., bedroom, kitchen, garage): [address]  []Motor Vehicle Collision  Specific vehicle type involved (e.g., sedan, minivan, SUV, pickup truck):   Collision with (e.g., type of vehicle, building, barn, ditch, tree):   []Single Vehicle Collision     []           []Fatality in Same Vehicle            []Passenger:      []Front Seat        []Rear Seat    []Unrestrained   []Lap Belt Only Restrained   [] Shoulder Belt Only Restrained  [] 3 Point Restrained    []Front Air Bag  []Side Air Bag  []Other Air Bag []Air Bag Not Deployed    []Ejected     []Rollover     []Extricated       CHILD:  []Booster Seat  []Infant Car Seat  [] Child Car Seat   []Motorcycle Collision Wearing Helmet     []Yes     []No    []Unknown  []Bicycle Collision Wearing Helmet     []Yes     []No    []Unknown  []Pedestrian Struck      [x]Fall    [x]From Standing     []From Height   Ft     []Down Stairs  []Assault  []Gunshot  Specify caliber / type of gun: ____________________________  []Stabbing  Specify weapon type, size: _____________________________  []Burn     []Flame   []Scald   []Electrical   []Chemical           []Contact   []Inhalation   []House fire  []Other ______________________________________________________  []Other protective devices used / worn ___________________________    HISTORY:   Got home from the store today and was picking up a container of laundry detergent and then she fell backwards striking her head. She stated she was not sure if she had a loss of consciousness but remembers getting up from the ground. Present[facility] to [facility], [facility] showed small SAH and she was transferred to [facility]. She is not complaining of any current headache or spine tenderness. Complaining of some mild right hip pain, xray negative for any acute pathology.  She is on ASA and Plavix allergies. PAST MEDICAL HISTORY: []  None   []   Information not available due to exam limitations documented above    has a past medical history of Diverticulosis of colon, Full dentures, History of colon polyps, Hyperlipidemia, Hypertension, Osteoarthritis, Osteoarthritis of hand, Osteoarthritis of lower leg, localized, Osteoporosis, Psoriasis, Sinus congestion, Tubulovillous adenoma of colon, and Wears glasses. has a past surgical history that includes Colonoscopy (6/14/2013); Colonoscopy (10/25/2013); joint replacement (Bilateral); Hysterectomy; skin biopsy; Knee Arthroplasty (Right, 12/8/14); Coronary artery bypass graft (12/22/15); pr colsc flx w/removal lesion by hot bx forceps (N/A, 4/22/2017); Colonoscopy (04/22/2017); Cardiac catheterization; and Colonoscopy (N/A, 6/16/2018). FAMILY HISTORY   []   Information not available due to exam limitations documented above    family history includes Breast Cancer in her sister; Cancer in her mother; Prostate Cancer in her brother. SOCIAL HISTORY  []   Information not available due to exam limitations documented above     reports that she has never smoked. She has never used smokeless tobacco.   reports no history of alcohol use. reports no history of drug use. PERTINENT SYSTEMIC REVIEW:  []   Information not available due to exam limitations documented above    General: Denies fever, chills, night sweats, weight loss, malaise, fatigue  HEENT: Denies sore throat, sinus problems, allergic rhinosinusitis  Card: Denies chest pain, palpitations, orthopnea/PND. Denies h/o murmurs  Pulm: Denies cough, shortness of breath, ABDI  GI:  per HPI; denies history of constipation, diarrhea, hematochezia or melena  : Denies polyuria, dysuria, hematuria  Endo: Denies diabetes, thyroid problems. Heme: Denies anemia, h/o bleeding or clotting problems. Neuro: Denies h/o CVA, TIA  Skin: Denies rashes, ulcers  Musculoskeletal: Complaining of R hip pain.       PHYSICAL EXAMINATION:   GLASCOW COMA SCALE  NEUROMUSCULAR BLOCKADE PRIOR TO ARRIVAL     []No        [x]Yes      Variable  Score   Variable  Score  Eye opening [x]Spontaneous 4 Verbal  [x]Oriented  5     []To voice  3   []Confused  4    []To pain  2   []Inapp words  3    []None  1   []Incomp words 2       []None  1   Motor   [x]Obeys  6    []Localizes pain 5    []Withdraws(pain) 4    []Flexion(pain) 3  []Extension(pain) 2    []None  1     GCS Total = 15    PHYSICAL EXAMINATION  VITAL SIGNS:   Vitals:    12/23/20 1732   BP: 132/68   Pulse: 68   Resp: 20   Temp:    SpO2: 94%       General Appearance: alert and oriented to person, place and time, well developed and well- nourished, in no acute distress  Skin: warm and dry, no rash or erythema  Head: hematoma on right side of scalp  Eyes: pupils equal, round, and reactive to light, extraocular eye movements intact, conjunctivae normal  ENT: tympanic membrane, external ear and ear canal normal bilaterally, nose without deformity, nasal mucosa and turbinates normal without polyps  Neck: supple and non-tender without mass, no thyromegaly or thyroid nodules, no cervical lymphadenopathy  Pulmonary/Chest: Equal breath sounds b/l  Cardiovascular: S1S2  Abdomen: soft, non-tender, non-distended  Pelvis:  Stable  Back:  No abrasions/lesions. No obvious deformities. No TTP. No step-offs  Rectal: Sphincter tone intact, No gross blood  Extremities: palpable radial, femoral, and pedal pulses b/l  Musculoskeletal: decreased ROM and tenderness of right hip  Neurologic: reflexes normal and symmetric, no cranial nerve deficit, gait, coordination and speech normal    FOCUSED ABDOMINAL SONOGRAM FOR TRAUMA (FAST): A good  quality examination was performed by Dr. Jerome Crum and representative images were obtained.   [x] No free fluid in the abdomen       RADIOLOGY  PLAIN FILMS  Ordered Findings  []CHEST []Normal  []PELVIS  []Normal    EXTREMITIES      []RUE []Normal   _____________________    []LUE []Normal   _____________________    [x]RLE [x]Normal   _____________________    []LLE  []Normal   _____________________  []OTHER []Normal   _____________________    CT SCANS  Ordered  [x]HEAD  []Normal     []CHEST  []Normal     []ABD/PELVIS[]Normal   []FACE []Normal     []C-SPINE  []Normal      []T-SPINE  []Normal   []L-SPINE  []Normal     [facility]  []Normal    LABS  Labs Reviewed   COVID-19       PROCEDURES (SEE SEPARATE OPERATIVE REPORTS)  []CENTRAL LINE  []OROTRACHEAL INTUBATION  []CHEST TUBE  []ARTERIAL LINE  []OTHER    Gerson Baum DO  12/23/20, 6:01 PM               Trauma Attending Attestation      I have reviewed the above GCS note(s) and confirmed the key elements of the medical history and physical exam. I have seen and examined the pt. I have discussed the findings, established the care plan and recommendations with Resident, GCS RN, bedside nurse.         Aakash Lofton DO  12/23/2020  6:45 PM

## 2020-12-23 NOTE — ED NOTES
Bed: 26  Expected date:   Expected time:   Means of arrival:   Comments:  [address], RN  12/23/20 3047

## 2020-12-23 NOTE — ED NOTES
Pt. Resting on stretcher, eyes open, RR even and non-labored  Pt.  Updated on POC  Will continue to monitor        Valerio Rizvi RN  12/23/20 6191

## 2020-12-23 NOTE — ED PROVIDER NOTES
[facility]     Emergency Department     Faculty Attestation    I performed a history and physical examination of the patient and discussed management with the resident. I reviewed the residents note and agree with the documented findings and plan of care. Any areas of disagreement are noted on the chart. I was personally present for the key portions of any procedures. I have documented in the chart those procedures where I was not present during the key portions. I have reviewed the emergency nurses triage note. I agree with the chief complaint, past medical history, past surgical history, allergies, medications, social and family history as documented unless otherwise noted below. For Physician Assistant/ Nurse Practitioner cases/documentation I have personally evaluated this patient and have completed at least one if not all key elements of the E/M (history, physical exam, and MDM). Additional findings are as noted. This patient was evaluated in the Emergency Department for symptoms described in the history of present illness. He/she was evaluated in the context of the global COVID-19 pandemic, which necessitated consideration that the patient might be at risk for infection with the SARS-CoV-2 virus that causes COVID-19. Institutional protocols and algorithms that pertain to the evaluation of patients at risk for COVID-19 are in a state of rapid change based on information released by regulatory bodies including the CDC and federal and state organizations. These policies and algorithms were followed during the patient's care in the ED. Trauma transfer from [facility] for traumatic subarachnoid. Open fall falling backwards hitting her head. She is on aspirin and Plavix. On arrival awake alert oriented normal pupils normal speech normal mental status. Denies severe headache at this time.   No deficits on exam.  Will call trauma team neurosurgery admit    Ct Head Wo Contrast Result Date: 12/23/2020  EXAMINATION: CT OF THE HEAD WITHOUT CONTRAST  12/23/2020 3:56 pm     1. Small focus of subarachnoid hemorrhage in the right para falcine area frontal region of 9.2 x 4.1 mm without mass effect. No intraparenchymal hemorrhage. 2.  Scalp soft tissue laceration edema and hematoma high right posterior parietal scalp of 2.6 x 5.4 cm. 3.  Senescent changes including chronic microvascular change. 4.  No acute calvarial abnormality.       Critical Care    none    Angelika Alaniz MD, Jewell Grimes  Attending Emergency  Physician             Angelika Alaniz MD  12/23/20 [id number]

## 2020-12-23 NOTE — ED TRIAGE NOTES
Pt. Arrives to ED via [facility] from [facility] for c/o fall at 11AM today. Pt. Was putting away groceries and fell backwards striking her head on concrete. Pt is on ASA and plavix. Pt is unsure of LOC. Pt. Was found to have [facility]. Pt transferred here for trauma consult and neurosurgery. Pt. Ambulated self to bathroom with steady gait at [facility] but was dizzy afterwards.

## 2020-12-23 NOTE — ED NOTES
Mode of arrival (squad #, walk in, police, etc) : walk in from home         Chief complaint(s): fall        Arrival Note (brief scenario, treatment PTA, etc). : pt arrives due to fall at home. Pt states she was walking up stairs when she fell back and hit her posterior head. Pt states she takes plavix daily. Pt states she was on the second step when she fell. Pt c/o right hip pain. Pt denies numbness and tingling to extremities. Pt denies dizziness, CP, SHB, abdominal pain. PERRLA intact. Pt ambulates with cane. Pt attached to cardiac monitor and continuous pulse oximetry. Pt is A&Ox4, eupneic, PWD. GCS=15. Call light in reach. C= \"Have you ever felt that you should Cut down on your drinking? \"  No  A= \"Have people Annoyed you by criticizing your drinking? \"  No  G= \"Have you ever felt bad or Guilty about your drinking? \"  No  E= \"Have you ever had a drink as an Eye-opener first thing in the morning to steady your nerves or to help a hangover? \"  No      Deferred []      Reason for deferring: N/A    *If yes to two or more: probable alcohol abuse. Sebastian Saucedo RN  12/23/20 1842

## 2020-12-23 NOTE — ED NOTES
Bed: 15  Expected date:   Expected time:   Means of arrival:   Comments:     Sreedhar Ken RN  12/23/20 [id number]

## 2020-12-23 NOTE — ED NOTES
Dr. Sukhdev Castano at bedside      [facility], [address] [place] [address]  12/23/20 Bob Rollins

## 2020-12-23 NOTE — ED NOTES
RAPID Covid 19 swab taken from right nare, labeled, placed in red dot bag, and handed off to second healthcare worker outside of room for transport to laboratory per hospital policy and procedure. Patient tolerated procedure fairly well.      Phyllis Villalta RN  12/23/20 [id number]

## 2020-12-23 NOTE — Clinical Note
Patient Class: Inpatient [101]   REQUIRED: Diagnosis: SAH ([facility]arachnoid hemorrhage) [facility] [[id number]]   Estimated Length of Stay: Estimated stay of more than 2 midnights   Telemetry Bed Required?: Yes

## 2020-12-23 NOTE — ED NOTES
Mode of arrival (squad #, walk in, police, etc) : walk in        Chief complaint(s): fall        Arrival Note (brief scenario, treatment PTA, etc). : Pt states she was carrying in bags when she missed a step and fell- striking back of head- landing on rt hip. Pt reports \"lump\" at back of head. Pt takes plavix. Denies HA/LOC/neck pain. Pt c/o right hip pain      C= \"Have you ever felt that you should Cut down on your drinking? \"  No  A= \"Have people Annoyed you by criticizing your drinking? \"  No  G= \"Have you ever felt bad or Guilty about your drinking? \"  No  E= \"Have you ever had a drink as an Eye-opener first thing in the morning to steady your nerves or to help a hangover? \"  No      Deferred []      Reason for deferring: N/A    *If yes to two or more: probable alcohol abuse. Kajal De La Vega RN  12/23/20 [id number]

## 2020-12-23 NOTE — CONSULTS
Neurosurgery Consult Note    Reason for Consult:  [facility]    Attending Physician: Philip Louise    History Obtained From:  patient, electronic medical record    CHIEF COMPLAINT:       Fall    HISTORY OF PRESENT ILLNESS:       The patient is a 66 y.o. female presented after falling from standing position on her back and hit her head. No LOC. No symptoms prior to the event. No neck pain. Has soft tissue swelling on the back of her head. No obvious bleeding. No blurriness of vision.      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      PAST MEDICAL HISTORY :       Past Medical History:        Diagnosis Date    Diverticulosis of colon     Full dentures     History of colon polyps 04/22/2017    Hyperlipidemia     Hypertension     Osteoarthritis     Osteoarthritis of hand     Osteoarthritis of lower leg, localized     Osteoporosis     Psoriasis     ON TOP & BACK OF  HEAD    Sinus congestion     IN AM    Tubulovillous adenoma of colon     Wears glasses        Past Surgical History:        Procedure Laterality Date    CARDIAC CATHETERIZATION      2 stents    COLONOSCOPY  6/14/2013    diverticulosis, tubulovillous adenoma, questionable prominent mucosa anorectal area, focus of superficial denudation consistent with focal active colitis    COLONOSCOPY  10/25/2013    residual polyp lower right colon, another polyp in the cecum, biopsy benign    COLONOSCOPY  04/22/2017    Total colonoscopy and biopsy and cauterization of polyps, pathology-sessile serrated adenoma x3    COLONOSCOPY N/A 6/16/2018    COLONOSCOPY POLYPECTOMY HOT BIOPSY performed by Raheem Jacobsen MD at [address] [place] [address]  12/22/15    OP CABG X 5- Dr Rose Ashby Bilateral     knee, LT. KNEE X 2     KNEE ARTHROPLASTY Right 12/8/14    KS COLSC FLX W/REMOVAL LESION BY HOT BX FORCEPS N/A 4/22/2017    COLONOSCOPY POLYPECTOMY HOT BIOPSY performed by Zev Almaraz MD at [address] [facility] ([facility])       Social History:   Social History     Socioeconomic History    Marital status:      Spouse name: Not on file    Number of children: Not on file    Years of education: Not on file    Highest education level: Not on file   Occupational History    Not on file   Social Needs    Financial resource strain: Not on file    Food insecurity     Worry: Not on file     Inability: Not on file    Transportation needs     Medical: Not on file     Non-medical: Not on file   Tobacco Use    Smoking status: Never Smoker    Smokeless tobacco: Never Used   Substance and Sexual Activity    Alcohol use: No     Alcohol/week: 0.0 standard drinks    Drug use: No    Sexual activity: Not on file   Lifestyle    Physical activity     Days per week: Not on file     Minutes per session: Not on file    Stress: Not on file   Relationships    Social connections     Talks on phone: Not on file     Gets together: Not on file     Attends [employer] service: Not on file     Active member of club or organization: Not on file     Attends meetings of clubs or organizations: Not on file     Relationship status: Not on file    Intimate partner violence     Fear of current or ex partner: Not on file     Emotionally abused: Not on file     Physically abused: Not on file     Forced sexual activity: Not on file   Other Topics Concern    Not on file   Social History Narrative    Not on file       Family History:       Problem Relation Age of Onset    Cancer Mother         lymphoma    Prostate Cancer Brother     Breast Cancer Sister        Allergies:  Patient has no known allergies. Home Medications:  Prior to Admission medications    Medication Sig Start Date End Date Taking? Authorizing Provider   alendronate (FOSAMAX) 70 MG tablet TAKE 1 TABLET BY MOUTH ONCE WEEKLY ON AN EMPTY STOMACH BEFORE BREAKFAST.  REMAIN UPRIGHT FOR 30 MINUTES & TAKE WITH 8 OUNCES OF WATER 11/23/20   Harjinder Caba MD   fenofibrate (TRICOR) 48 MG tablet TAKE ONE TABLET BY MOUTH DAILY 10/5/20   Harjinder Caba MD   clopidogrel (PLAVIX) 75 MG tablet Take 75 mg by mouth daily    Historical Provider, MD   hydrochlorothiazide (HYDRODIURIL) 12.5 MG tablet Take 12.5 mg by mouth daily    Historical Provider, MD   carvedilol (COREG) 12.5 MG tablet Take 1 tablet by mouth 2 times daily 4/13/20   Harjinder Caba MD   atorvastatin (LIPITOR) 20 MG tablet Take 1 tablet by mouth daily 10/8/19   Lydia Guillen MD   nitroGLYCERIN (NITROSTAT) 0.4 MG SL tablet Place 1 tablet under the tongue every 5 minutes as needed for Chest pain up to max of 3 total doses. If no relief after 1 dose, call 911. 7/3/18   Lydia Guillen MD   aspirin 81 MG EC tablet Take 1 tablet by mouth daily 12/25/15   Matthew Liu MD   Vitamin D (CHOLECALCIFEROL) 1000 UNITS CAPS capsule Take 1,000 Units by mouth daily. Historical Provider, MD   Multiple Vitamins-Minerals (THERAPEUTIC MULTIVITAMIN-MINERALS) tablet Take 1 tablet by mouth daily. Historical Provider, MD   Calcium Carbonate-Vitamin D (CALCIUM + D PO) Take 500 mg by mouth daily. Historical Provider, MD   ascorbic acid (VITAMIN C) 500 MG tablet Take 500 mg by mouth daily. Historical Provider, MD   acetaminophen (TYLENOL) 325 MG tablet Take 650 mg by mouth every 6 hours as needed. Historical Provider, MD       Current Medications:   No current facility-administered medications for this encounter. PHYSICAL EXAM:       /68   Pulse 68   Temp 99.3 °F (37.4 °C) (Oral)   Resp 20   SpO2 94%       Physical Exam  Constitutional:       Appearance: Normal appearance. HENT:      Head: Atraumatic. Eyes:      Extraocular Movements: Extraocular movements intact. Pupils: Pupils are equal, round, and reactive to light. Neck:      Musculoskeletal: Normal range of motion and neck supple.    Cardiovascular:      Rate and Rhythm: Normal rate and regular rhythm. Pulmonary:      Effort: Pulmonary effort is normal.      Breath sounds: Normal breath sounds. Abdominal:      General: Abdomen is flat. Musculoskeletal: Normal range of motion. Skin:     General: Skin is warm. Neurological:      General: No focal deficit present. Mental Status: She is alert and oriented to person, place, and time. Mental status is at baseline. Cranial Nerves: No cranial nerve deficit. Sensory: No sensory deficit. Motor: No weakness. Neurologic Exam     Mental Status   Oriented to person, place, and time. Cranial Nerves     CN III, IV, VI   Pupils are equal, round, and reactive to light. LABS AND IMAGING:     CBC with Differential:    Lab Results   Component Value Date    WBC 10.4 12/23/2020    RBC 4.56 12/23/2020    RBC 4.71 03/06/2012    HGB 13.1 12/23/2020    HCT 38.6 12/23/2020     12/23/2020     03/06/2012    MCV 84.5 12/23/2020    MCH 28.7 12/23/2020    MCHC 34.0 12/23/2020    RDW 13.2 12/23/2020    LYMPHOPCT 19 12/23/2020    MONOPCT 8 12/23/2020    BASOPCT 1 12/23/2020    MONOSABS 0.80 12/23/2020    LYMPHSABS 2.00 12/23/2020    EOSABS 0.10 12/23/2020    BASOSABS 0.00 12/23/2020    DIFFTYPE NOT REPORTED 12/23/2020     BMP:    Lab Results   Component Value Date     12/23/2020    K 3.9 12/23/2020     12/23/2020    CO2 27 12/23/2020    BUN 17 12/23/2020    LABALBU 4.4 12/23/2020    LABALBU 4.7 03/06/2012    CREATININE 0.83 12/23/2020    CALCIUM 10.9 12/23/2020    GFRAA >60 12/23/2020    LABGLOM >60 12/23/2020    GLUCOSE 111 12/23/2020    GLUCOSE 95 03/06/2012       Radiology Review:    CT head w/o contrast   1. Small focus of subarachnoid hemorrhage in the right para falcine area frontal region of 9.2 x 4.1 mm without mass effect. No intraparenchymal hemorrhage. 2.  Scalp soft tissue laceration edema and hematoma high right posterior parietal scalp of 2.6 x 5.4 cm.   3.  Senescent changes including chronic microvascular change. 4.  No acute calvarial abnormality. ASSESSMENT AND PLAN:       Small, focal traumatic SAH, right parafalcine        Labs, Radiologic studies and notes were reviewed   - Repeat CTH in AM   - Continue Neuro assessment per unit protocol.   - Advance diet as tolerateden  - Encourage ambulation / up with assist/ Advance activities as tolerated   - Encourage use of IS frequently  - Postoperatively doing well , Neurologically stable   - Continue surgical wound  Care and  Change surgical dressing as needed . - Keep Head Of Bed [facility]) greater than 30°  - Recommend to keep SBP < 160  - SCD sleeves , YOKASTA stockings for DVT prophylaxis  - Hold all antiplatelets and anticoagulants      Please contact neurosurgery with any changes in patient's neurological status, any questions or concerns. Oneyda Flores  Neurology Resident  Department of Neurosurgery    Electronically signed by Yary Nam MD on 12/23/20 at 6:01 PM EST      This note is created with the assistance of a speech recognition program.  While intending to generate a document that actually reflects the content of the visit, the document can still have some errors including those of syntax and sound a like substitutions which may escape proof reading. In such instances, actual meaning can be extrapolated by contextual diversion.

## 2020-12-23 NOTE — FLOWSHEET NOTE
SPIRITUAL CARE DEPARTMENT - [facility] [address]  PROGRESS NOTE    Shift date: 12/23/2020  Shift day: Wednesday   Shift # 2    Room # 26/26   Name: Amanda Galvan            Age: 66 y.o. Gender: female          Rastafari: [employer]:     Referral:Multi dis team    Admit Date & Time: 12/23/2020  4:57 PM    PATIENT/EVENT DESCRIPTION:  Amanda Galvan is a 66 y.o. female who arrived after a fall. Pt. Is alert and open to visit. SPIRITUAL ASSESSMENT/INTERVENTION:  Pt. Is Jew and open to prayer and emotional support. Pt. Was very busy with others coming in and out for tests and procedures but appreciated visit. SPIRITUAL CARE FOLLOW-UP PLAN:  Chaplains will remain available to offer spiritual and emotional support as needed.       Electronically signed by Dana Martinez on 12/23/2020 at [address] [facility] Garth Lizarraga  [phone]

## 2020-12-23 NOTE — ED PROVIDER NOTES
[facility] ED  Emergency Department Encounter  Emergency Medicine Resident     Pt Name: Dayo Saleem  MRN: [id number]  [place] 1945  Date of evaluation: 12/23/20  PCP:  Alisha Jain MD    [address] [place] [address]       Chief Complaint   Patient presents with    Fall     fall from standing at 11am on plavix and asa, [facility] transfer       HISTORY OFPRESENT ILLNESS  (Location/Symptom, Timing/Onset, Context/Setting, Quality, Duration, Modifying Factors,Severity.)      Dayo Saleem is a 66 y. o.yo female past medical history significant for hypertension and previous cardiac stenting on aspirin and plavix who presents as a stroke from [facility] after she had a traumatic fall. Patient was on the second step of her house carrying in groceries when she subsequently fell backwards and hit her head. Patient was seen at [facility] and had a CT done which showed a subarachnoid hemorrhage. Patient also had a laceration and hematoma. Patient currently complaining of mild headache, right hip pain. Patient had an x-ray done at [facility] which showed no acute fracture of the right hip. No change in vision, no chest pain, no redness of breath, no weakness, no tingling, no numbness. Denies fever, chills, cough, congestion. Denies other trauma. Denies history of seizures, does report remote history of syncopal event as a child, no recent syncopal events otherwise. PAST MEDICAL / SURGICAL / SOCIAL / FAMILY HISTORY      has a past medical history of Diverticulosis of colon, Full dentures, History of colon polyps, Hyperlipidemia, Hypertension, Osteoarthritis, Osteoarthritis of hand, Osteoarthritis of lower leg, localized, Osteoporosis, Psoriasis, Sinus congestion, Tubulovillous adenoma of colon, and Wears glasses. has a past surgical history that includes Colonoscopy (6/14/2013); Colonoscopy (10/25/2013); joint replacement (Bilateral);  Hysterectomy; skin biopsy; Knee Arthroplasty (Right, 500 mg by mouth daily. Historical Provider, MD   ascorbic acid (VITAMIN C) 500 MG tablet Take 500 mg by mouth daily. Historical Provider, MD   acetaminophen (TYLENOL) 325 MG tablet Take 650 mg by mouth every 6 hours as needed. Historical Provider, MD       REVIEW OFSYSTEMS    (2-9 systems for level 4, 10 or more for level 5)      Review of Systems   Constitutional: Negative for activity change, chills and fever. HENT: Negative for congestion, rhinorrhea and sore throat. Eyes: Negative for pain and visual disturbance. Respiratory: Negative for cough and shortness of breath. Cardiovascular: Negative for chest pain and palpitations. Gastrointestinal: Negative for abdominal pain, constipation and diarrhea. Genitourinary: Negative for difficulty urinating and frequency. Musculoskeletal: Positive for back pain. Negative for arthralgias and myalgias. Right leg/hip pain    Skin: Negative for rash and wound. Neurological: Positive for syncope and headaches. Negative for dizziness. PHYSICAL EXAM   (up to 7 for level 4, 8 or more forlevel 5)      INITIAL VITALS:   Vitals:    12/23/20 1801   BP: (!) 149/76   Pulse: 67   Resp: 12   Temp:    SpO2: 96%        Physical Exam  Vitals signs and nursing note reviewed. Constitutional:       General: She is not in acute distress. Appearance: Normal appearance. She is not ill-appearing. HENT:      Head: Normocephalic. Comments: Hematoma noted on posterior aspect of parietal region      Nose: Nose normal.      Mouth/Throat:      Mouth: Mucous membranes are moist.      Pharynx: Oropharynx is clear. Eyes:      General:         Right eye: No discharge. Left eye: No discharge. Pupils: Pupils are equal, round, and reactive to light. Cardiovascular:      Rate and Rhythm: Normal rate and regular rhythm. Heart sounds: No murmur. No friction rub. No gallop.     Pulmonary:      Effort: Pulmonary effort is normal. No respiratory distress. Breath sounds: Normal breath sounds. Abdominal:      General: There is no distension. Palpations: Abdomen is soft. Tenderness: There is no abdominal tenderness. Musculoskeletal:         General: No deformity or signs of injury. Right lower leg: No edema. Left lower leg: No edema. Comments: Able to move all extremities   strength intact and equal in bilateral upper extremities, flexion and extension intact and equal in bilateral upper extremities  Dorsiflexion and plantarflexion intact and equal in bilateral lower extremities, flexion at the hip intact and equal in bilateral lower extremities. Skin:     General: Skin is warm and dry. Neurological:      General: No focal deficit present. Mental Status: She is alert and oriented to person, place, and time. Comments: Cranial nerves intact and equal 2 through 12  Finger to nose intact bilateral upper extremities   Pupils equal and reactive bilaterally   GCS 15    Psychiatric:         Mood and Affect: Mood normal.         Thought Content: Thought content normal.         DIFFERENTIAL  DIAGNOSIS       DDX: Subarachnoid hemorrhage. Initial MDM/Plan: 66 y.o. female who presents as a transfer from [facility] for higher level of care. Patient was noted to have a subarachnoid hemorrhage after a fall from 2 stairs up, patient is on aspirin and Plavix. Patient is stable upon arrival to our emergency department, vital signs stable, denying significant pain or need for pain management at this time. Cranial nerves II through XII and finger-to-nose, bilateral upper and bilateral lower extremities strength intact and equal bilaterally. Neurologically intact. Alert, oriented to person place and time at this time. We will plan for Covid swab, inpatient consult to trauma and neurosurgery. Patient to be admitted for higher level of care.     DIAGNOSTIC RESULTS / EMERGENCYDEPARTMENT COURSE / MDM LABS:  Labs Reviewed   COVID-19         RADIOLOGY:  Ct Head Wo Contrast    Result Date: 12/23/2020  EXAMINATION: CT OF THE HEAD WITHOUT CONTRAST  12/23/2020 3:56 pm TECHNIQUE: CT of the head was performed without the administration of intravenous contrast. Dose modulation, iterative reconstruction, and/or weight based adjustment of the mA/kV was utilized to reduce the radiation dose to as low as reasonably achievable. COMPARISON: None HISTORY: ORDERING SYSTEM PROVIDED HISTORY: fall TECHNOLOGIST PROVIDED HISTORY: fall Reason for Exam: fall hit back of head Acuity: Unknown Type of Exam: Unknown FINDINGS: BRAIN/VENTRICLES: There is no mass effect or midline shift. Small focus of subarachnoid hemorrhage is present in the para falcine area in the high frontal area of 9.2 x 4.1 mm. No abnormal extra-axial fluid collection. The gray-white differentiation is maintained without evidence of an acute infarct. There is no evidence of hydrocephalus. Scattered hypodensity is present in the white matter consistent with chronic microvascular change. ORBITS: The visualized portion of the orbits demonstrate no acute abnormality. SINUSES: Mild sinus inflammation left maxillary sinus. Other paranasal sinuses and mastoid air cells are appropriately aerated. SOFT TISSUES/SKULL:  Soft tissue laceration, edema and hematoma is present in the high right posterior parietal scalp, area of involvement approximately 5.4 x 2.6 cm. No acute osseous calvarial abnormality. 1.  Small focus of subarachnoid hemorrhage in the right para falcine area frontal region of 9.2 x 4.1 mm without mass effect. No intraparenchymal hemorrhage. 2.  Scalp soft tissue laceration edema and hematoma high right posterior parietal scalp of 2.6 x 5.4 cm. 3.  Senescent changes including chronic microvascular change. 4.  No acute calvarial abnormality. Critical results were called by Dr. Raymond Kirkland MD to [address] on 12/23/2020 at 16:04.      Xr Hip 2-3 Vw W Pelvis Right    Result Date: 12/23/2020  EXAMINATION: ONE XRAY VIEW OF THE PELVIS AND TWO XRAY VIEWS RIGHT HIP 12/23/2020 4:10 pm COMPARISON: None. HISTORY: ORDERING SYSTEM PROVIDED HISTORY: fall, pain TECHNOLOGIST PROVIDED HISTORY: fall, pain Reason for Exam: fall, right hip pain Acuity: Unknown Type of Exam: Unknown FINDINGS: There is mild narrowing of the bilateral hip joint spaces. Normal alignment. Suggestion of subchondral cystic changes in the heads of both right and left femur, somewhat greater right femur. No evidence of fracture or cortical irregularity. There is some degenerative changes in the lower portion of the right SI joint as well as in the visualized lower lumbar spine, and at the symphysis. What appears to be an old IUD is projected over the upper right iliac wing. Surgical clips are present in the soft tissues medial to the right femur. Mild narrowing of the hip joint spaces and suggestion of subchondral cystic changes in the heads of each femur. No acute abnormality is identified in the hips. EKG  Not indicated at this time     All EKG's are interpreted by the Emergency Department Physicianwho either signs or Co-signs this chart in the absence of a cardiologist.    EMERGENCY DEPARTMENT COURSE:  ED Course as of Dec 23 1843   Wed Dec 23, 2020   1755 Paged neurosurgery and trauma team.  Trauma at bedside, full signout given to trauma team.    [MA]   1755 Covid negative. COVID-19:    SARS-CoV-2        SARS-CoV-2, Rapid Not Detected   Source . NASOPHARYNGEAL SWAB   SARS-CoV-2      [MA]   1667 Waiting for page back from neurosurgery team.    [MA]      ED Course User Index  [MA] Cash Sarabia DO      Patient presents as a transfer for higher level of care from [facility]. Patient is alert and oriented, answering all questions appropriately.   Neurologically intact, cranial nerves II through XII intact and equal bilaterally, strength intact and equal in upper extremities, and lower extremities. Discussed with Trauma team and Neurosurgical team is aware of patient. Patient boarding in the emergency department awaiting bed, admission orders placed by trauma team. Discussed with neurosurgery team, patient is accepted, nothing pending. Patient signed out to Dr. Perez Blankenship while in the ED awaiting bed. PROCEDURES:  None    CONSULTS:  IP CONSULT TO TRAUMA SURGERY  IP CONSULT TO NEUROSURGERY      FINAL IMPRESSION      1. Subarachnoid hemorrhage ([facility] [address]          DISPOSITION / PLAN     DISPOSITION Admitted 12/23/2020 06:33:35 PM      PATIENT REFERRED TO:  No follow-up provider specified.     DISCHARGE MEDICATIONS:  New Prescriptions    No medications on file       Serjio Christian DO  Emergency Medicine Resident    (Please note that portions of this note were completed with a voice recognition program.Efforts were made to edit the dictations but occasionally words are mis-transcribed.)       Serjio Christian DO  Resident  12/23/20 [id number]

## 2020-12-23 NOTE — ED PROVIDER NOTES
[facility] ED  Emergency Department  Emergency Medicine Resident Sign-out     Care of Amanda Galvan was assumed from Dr. Pat Workman and is being seen for Fall (fall from standing at 11am on plavix and asa, [facility] transfer)  . The patient's initial evaluation and plan have been discussed with the prior provider who initially evaluated the patient. EMERGENCY DEPARTMENT COURSE / MEDICAL DECISION MAKING:       MEDICATIONS GIVEN:  No orders of the defined types were placed in this encounter. LABS / RADIOLOGY:     Labs Reviewed   COVID-19       Ct Head Wo Contrast    Result Date: 12/23/2020  EXAMINATION: CT OF THE HEAD WITHOUT CONTRAST  12/23/2020 3:56 pm TECHNIQUE: CT of the head was performed without the administration of intravenous contrast. Dose modulation, iterative reconstruction, and/or weight based adjustment of the mA/kV was utilized to reduce the radiation dose to as low as reasonably achievable. COMPARISON: None HISTORY: ORDERING SYSTEM PROVIDED HISTORY: fall TECHNOLOGIST PROVIDED HISTORY: fall Reason for Exam: fall hit back of head Acuity: Unknown Type of Exam: Unknown FINDINGS: BRAIN/VENTRICLES: There is no mass effect or midline shift. Small focus of subarachnoid hemorrhage is present in the para falcine area in the high frontal area of 9.2 x 4.1 mm. No abnormal extra-axial fluid collection. The gray-white differentiation is maintained without evidence of an acute infarct. There is no evidence of hydrocephalus. Scattered hypodensity is present in the white matter consistent with chronic microvascular change. ORBITS: The visualized portion of the orbits demonstrate no acute abnormality. SINUSES: Mild sinus inflammation left maxillary sinus. Other paranasal sinuses and mastoid air cells are appropriately aerated.  SOFT TISSUES/SKULL:  Soft tissue laceration, edema and hematoma is present in the high right posterior parietal scalp, area of involvement approximately 5.4 x 2.6 cm.  No acute osseous calvarial abnormality. 1.  Small focus of subarachnoid hemorrhage in the right para falcine area frontal region of 9.2 x 4.1 mm without mass effect. No intraparenchymal hemorrhage. 2.  Scalp soft tissue laceration edema and hematoma high right posterior parietal scalp of 2.6 x 5.4 cm. 3.  Senescent changes including chronic microvascular change. 4.  No acute calvarial abnormality. Critical results were called by Dr. Cecilia Johnson MD to [address] on 12/23/2020 at 16:04. Xr Hip 2-3 Vw W Pelvis Right    Result Date: 12/23/2020  EXAMINATION: ONE XRAY VIEW OF THE PELVIS AND TWO XRAY VIEWS RIGHT HIP 12/23/2020 4:10 pm COMPARISON: None. HISTORY: ORDERING SYSTEM PROVIDED HISTORY: fall, pain TECHNOLOGIST PROVIDED HISTORY: fall, pain Reason for Exam: fall, right hip pain Acuity: Unknown Type of Exam: Unknown FINDINGS: There is mild narrowing of the bilateral hip joint spaces. Normal alignment. Suggestion of subchondral cystic changes in the heads of both right and left femur, somewhat greater right femur. No evidence of fracture or cortical irregularity. There is some degenerative changes in the lower portion of the right SI joint as well as in the visualized lower lumbar spine, and at the symphysis. What appears to be an old IUD is projected over the upper right iliac wing. Surgical clips are present in the soft tissues medial to the right femur. Mild narrowing of the hip joint spaces and suggestion of subchondral cystic changes in the heads of each femur. No acute abnormality is identified in the hips. RECENT VITALS:     Temp: 99.3 °F (37.4 °C),  Pulse: 74(standing), Resp: 18, BP: (!) 150/84, SpO2: 98 %    ED Course as of Dec 23 1856   Wed Dec 23, 2020   1755 Paged neurosurgery and trauma team.  Trauma at bedside, full signout given to trauma team.    [MA]   1755 Covid negative. COVID-19:    SARS-CoV-2        SARS-CoV-2, Rapid Not Detected   Source . NASOPHARYNGEAL SWAB SARS-CoV-2      [MA]   7521 Waiting for page back from neurosurgery team.    [MA]      ED Course User Index  [MA] Ephraim Mariano DO       This patient is a 66 y.o. Female with fall from standing height. On Plavix and aspirin. Seen at [facility]. Transferred due to subarachnoid hemorrhage. Patient alert and oriented. No neuro deficits. No platelets or DDAVP given. No anticoagulation. Admitted to trauma with neurosurgery consultation. OUTSTANDING TASKS / RECOMMENDATIONS:    1. Admitted to trauma surgery     FINAL IMPRESSION:     1. Subarachnoid hemorrhage ([address]        DISPOSITION:         DISPOSITION:  []  Discharge   []  Transfer -    [x]  Admission -     []  Against Medical Advice   []  Eloped   FOLLOW-UP: No follow-up provider specified.    DISCHARGE MEDICATIONS: New Prescriptions    No medications on file          Aliza Arevalo DO  Emergency Medicine Resident  [facility]       [place]  Resident  12/23/20 2025

## 2020-12-23 NOTE — ED PROVIDER NOTES
EMERGENCY DEPARTMENT ENCOUNTER    Pt Name: Cathie Wray  MRN: [id number]  [place] 1945  Date of evaluation: 12/23/20  CHIEF COMPLAINT       Chief Complaint   Patient presents with    Fall     rt hip pain- \"lump\" to back of head- on Plavix     HISTORY OF PRESENT ILLNESS   49-year-old female presents with complaint of fall and right hip pain as well as lump on her head. Patient states she was walking up her stairs with groceries lost her balance and fell back approximately 2 stairs. Patient states she did hit her head on the concrete, unsure if she lost consciousness, patient also complaining of right hip pain as well. Denies any neck or back pain, denies any new numbness, tingling or weakness in upper or lower extremities, denies any saddle paresthesias, bowel or bladder incontinence. Denies any chest pain, shortness of breath, nausea, vomiting, fevers or chills, dizziness or lightheadedness. Patient denies any dizziness or lightheadedness prior to falling. Does currently take Plavix    The history is provided by the patient. REVIEW OF SYSTEMS     Review of Systems   Constitutional: Negative for fever. HENT: Negative for congestion and ear pain. Eyes: Negative for pain. Respiratory: Negative for shortness of breath. Cardiovascular: Negative for chest pain, palpitations and leg swelling. Gastrointestinal: Negative for abdominal pain. Genitourinary: Negative for dysuria and flank pain. Musculoskeletal: Negative for back pain. Right hip pain   Skin: Negative for color change. Neurological: Negative for numbness and headaches. Psychiatric/Behavioral: Negative for confusion. All other systems reviewed and are negative.     PASTMEDICAL HISTORY     Past Medical History:   Diagnosis Date    Diverticulosis of colon     Full dentures     History of colon polyps 04/22/2017    Hyperlipidemia     Hypertension     Osteoarthritis     Osteoarthritis of hand     Osteoarthritis Maira Puente MD at [address] [facility] ([facility])     CURRENT MEDICATIONS       Previous Medications    ACETAMINOPHEN (TYLENOL) 325 MG TABLET    Take 650 mg by mouth every 6 hours as needed. ALENDRONATE (FOSAMAX) 70 MG TABLET    TAKE 1 TABLET BY MOUTH ONCE WEEKLY ON AN EMPTY STOMACH BEFORE BREAKFAST. REMAIN UPRIGHT FOR 30 MINUTES & TAKE WITH 8 OUNCES OF WATER    ASCORBIC ACID (VITAMIN C) 500 MG TABLET    Take 500 mg by mouth daily. ASPIRIN 81 MG EC TABLET    Take 1 tablet by mouth daily    ATORVASTATIN (LIPITOR) 20 MG TABLET    Take 1 tablet by mouth daily    CALCIUM CARBONATE-VITAMIN D (CALCIUM + D PO)    Take 500 mg by mouth daily. CARVEDILOL (COREG) 12.5 MG TABLET    Take 1 tablet by mouth 2 times daily    CLOPIDOGREL (PLAVIX) 75 MG TABLET    Take 75 mg by mouth daily    FENOFIBRATE (TRICOR) 48 MG TABLET    TAKE ONE TABLET BY MOUTH DAILY    HYDROCHLOROTHIAZIDE (HYDRODIURIL) 12.5 MG TABLET    Take 12.5 mg by mouth daily    MULTIPLE VITAMINS-MINERALS (THERAPEUTIC MULTIVITAMIN-MINERALS) TABLET    Take 1 tablet by mouth daily. NITROGLYCERIN (NITROSTAT) 0.4 MG SL TABLET    Place 1 tablet under the tongue every 5 minutes as needed for Chest pain up to max of 3 total doses. If no relief after 1 dose, call 911. VITAMIN D (CHOLECALCIFEROL) 1000 UNITS CAPS CAPSULE    Take 1,000 Units by mouth daily. ALLERGIES     has No Known Allergies. FAMILY HISTORY     She indicated that her mother is . She indicated that her father is . She indicated that her sister is alive. She indicated that her brother is alive.      SOCIAL HISTORY       Social History     Tobacco Use    Smoking status: Never Smoker    Smokeless tobacco: Never Used   Substance Use Topics    Alcohol use: No     Alcohol/week: 0.0 standard drinks    Drug use: No     PHYSICAL EXAM     INITIAL VITALS: BP (!) 158/79   Pulse 71   Resp 22   Ht 5' 1\" (1.549 m)   Wt 157 lb (71.2 kg)   SpO2 96%   BMI 29.66 kg/m² Physical Exam  Vitals signs and nursing note reviewed. Constitutional:       General: She is not in acute distress. Appearance: Normal appearance. She is obese. She is not toxic-appearing. HENT:      Head: No raccoon eyes, Childs's sign or laceration. Jaw: There is normal jaw occlusion. Right Ear: No hemotympanum. Left Ear: No hemotympanum. Ears:      Comments: Hematoma noted to the right parietal scalp, no laceration or abrasion noted     Nose: Nose normal.      Mouth/Throat:      Mouth: Mucous membranes are moist.      Pharynx: Oropharynx is clear. Eyes:      Extraocular Movements: Extraocular movements intact. Conjunctiva/sclera: Conjunctivae normal.   Neck:      Musculoskeletal: Normal range of motion. Cardiovascular:      Rate and Rhythm: Normal rate and regular rhythm. Pulses: Normal pulses. Heart sounds: Normal heart sounds. Pulmonary:      Effort: Pulmonary effort is normal.      Breath sounds: Normal breath sounds. Abdominal:      General: Bowel sounds are normal. There is no distension. Palpations: Abdomen is soft. Tenderness: There is no abdominal tenderness. Musculoskeletal:      Right hip: She exhibits decreased range of motion and tenderness. She exhibits no deformity. Comments: Tenderness in the right hip, decreased range of motion secondary to pain   Skin:     General: Skin is warm and dry. Capillary Refill: Capillary refill takes less than 2 seconds. Neurological:      General: No focal deficit present. Mental Status: She is alert. Psychiatric:         Mood and Affect: Mood normal.         MEDICAL DECISION MAKIN-year-old female presents with complaint of fall, head injury and right hip pain.   Initial exam patient is in no acute distress, vitals are stable, patient with hematoma noted to the right parietal scalp, also with tenderness in the right hip, patient did have possible loss of consciousness, will obtain CT scan, patient does also take Plavix, will also obtain x-rays of the right hip. CT was reviewed showing a small right subarachnoid hemorrhage, will obtain IV access, basic labs    Patient will be transferred to [facility] for trauma and neurosurgery evaluation    Discussed with Dr. Jaylen Green in ED, will accept transfer, discussed with Dr. Gina Allen, neurosurgeon, will evaluate patient upon her arrival, no further recommendations at this time    Also discussed with patient and patient's daughter, both are agreeable to transfer at this time           CRITICAL CARE:       PROCEDURES:    Procedures    DIAGNOSTIC RESULTS   EKG:All EKG's are interpreted by the Emergency Department Physician who either signs or Co-signs this chart in the absence of a cardiologist.        RADIOLOGY:All plain film, CT, MRI, and formal ultrasound images (except ED bedside ultrasound) are read by the radiologist, see reports below, unless otherwisenoted in MDM or here. XR HIP 2-3 VW W PELVIS RIGHT   Final Result   Mild narrowing of the hip joint spaces and suggestion of subchondral cystic   changes in the heads of each femur. No acute abnormality is identified in   the hips. CT Head WO Contrast   Preliminary Result   1. Small focus of subarachnoid hemorrhage in the right para falcine area   frontal region of 9.2 x 4.1 mm without mass effect. No intraparenchymal   hemorrhage. 2.  Scalp soft tissue laceration edema and hematoma high right posterior   parietal scalp of 2.6 x 5.4 cm. 3.  Senescent changes including chronic microvascular change. 4.  No acute calvarial abnormality. Critical results were called by Dr. Angelo Ramsay MD to [address] on   12/23/2020 at 16:04. LABS: All lab results were reviewed by myself, and all abnormals are listed below.   Labs Reviewed   CBC WITH AUTO DIFFERENTIAL - Abnormal; Notable for the following components:       Result Value    Seg Neutrophils 71 (*)     Lymphocytes 19 (*)     Monocytes 8 (*)     All other components within normal limits   COMPREHENSIVE METABOLIC PANEL W/ REFLEX TO MG FOR LOW K - Abnormal; Notable for the following components:    Glucose 111 (*)     Calcium 10.9 (*)     Total Bilirubin 0.22 (*)     All other components within normal limits   PROTIME-INR   APTT       EMERGENCY DEPARTMENTCOURSE:         Vitals:    Vitals:    12/23/20 1519 12/23/20 1609 12/23/20 1615 12/23/20 1621   BP: (!) 154/66 (!) 161/75 (!) 158/79    Pulse: 73   71   Resp: 14   22   TempSrc: Oral      SpO2: 95%  97% 96%   Weight: 157 lb (71.2 kg)      Height: 5' 1\" (1.549 m)          The patient was given the following medications while in the emergency department:  No orders of the defined types were placed in this encounter. CONSULTS:  None    FINAL IMPRESSION      1. Traumatic subarachnoid hemorrhage with loss of consciousness of 30 minutes or less, initial encounter [facility])          DISPOSITION/PLAN   DISPOSITION Decision To Transfer 12/23/2020 04:06:07 PM      PATIENT REFERRED TO:  No follow-up provider specified.   DISCHARGE MEDICATIONS:  New Prescriptions    No medications on file     Susie Vieyra DO  Attending Emergency Physician                  Susie Vieyra DO  12/23/20 7438

## 2020-12-24 ENCOUNTER — APPOINTMENT (OUTPATIENT)
Dept: CT IMAGING | Age: 75
DRG: 084 | End: 2020-12-24
Payer: MEDICARE

## 2020-12-24 ENCOUNTER — APPOINTMENT (OUTPATIENT)
Dept: GENERAL RADIOLOGY | Age: 75
DRG: 084 | End: 2020-12-24
Payer: MEDICARE

## 2020-12-24 VITALS
DIASTOLIC BLOOD PRESSURE: 76 MMHG | TEMPERATURE: 99.3 F | SYSTOLIC BLOOD PRESSURE: 138 MMHG | HEART RATE: 66 BPM | OXYGEN SATURATION: 94 % | RESPIRATION RATE: 16 BRPM

## 2020-12-24 LAB
ANION GAP SERPL CALCULATED.3IONS-SCNC: 12 MMOL/L (ref 9–17)
BUN BLDV-MCNC: 13 MG/DL (ref 8–23)
BUN/CREAT BLD: ABNORMAL (ref 9–20)
CALCIUM SERPL-MCNC: 9.6 MG/DL (ref 8.6–10.4)
CHLORIDE BLD-SCNC: 101 MMOL/L (ref 98–107)
CO2: 23 MMOL/L (ref 20–31)
CREAT SERPL-MCNC: 0.71 MG/DL (ref 0.5–0.9)
EKG ATRIAL RATE: 60 BPM
EKG P AXIS: 34 DEGREES
EKG P-R INTERVAL: 218 MS
EKG Q-T INTERVAL: 416 MS
EKG QRS DURATION: 78 MS
EKG QTC CALCULATION (BAZETT): 416 MS
EKG R AXIS: -13 DEGREES
EKG T AXIS: 94 DEGREES
EKG VENTRICULAR RATE: 60 BPM
GFR AFRICAN AMERICAN: >60 ML/MIN
GFR NON-AFRICAN AMERICAN: >60 ML/MIN
GFR SERPL CREATININE-BSD FRML MDRD: ABNORMAL ML/MIN/{1.73_M2}
GFR SERPL CREATININE-BSD FRML MDRD: ABNORMAL ML/MIN/{1.73_M2}
GLUCOSE BLD-MCNC: 112 MG/DL (ref 70–99)
POTASSIUM SERPL-SCNC: 3.7 MMOL/L (ref 3.7–5.3)
SODIUM BLD-SCNC: 136 MMOL/L (ref 135–144)

## 2020-12-24 PROCEDURE — G0378 HOSPITAL OBSERVATION PER HR: HCPCS

## 2020-12-24 PROCEDURE — 6370000000 HC RX 637 (ALT 250 FOR IP): Performed by: STUDENT IN AN ORGANIZED HEALTH CARE EDUCATION/TRAINING PROGRAM

## 2020-12-24 PROCEDURE — 73080 X-RAY EXAM OF ELBOW: CPT

## 2020-12-24 PROCEDURE — 2580000003 HC RX 258: Performed by: STUDENT IN AN ORGANIZED HEALTH CARE EDUCATION/TRAINING PROGRAM

## 2020-12-24 PROCEDURE — 97116 GAIT TRAINING THERAPY: CPT

## 2020-12-24 PROCEDURE — 80048 BASIC METABOLIC PNL TOTAL CA: CPT

## 2020-12-24 PROCEDURE — 92523 SPEECH SOUND LANG COMPREHEN: CPT

## 2020-12-24 PROCEDURE — APPSS30 APP SPLIT SHARED TIME 16-30 MINUTES: Performed by: REGISTERED NURSE

## 2020-12-24 PROCEDURE — 70450 CT HEAD/BRAIN W/O DYE: CPT

## 2020-12-24 PROCEDURE — 97161 PT EVAL LOW COMPLEX 20 MIN: CPT

## 2020-12-24 RX ORDER — HYDROCHLOROTHIAZIDE 25 MG/1
12.5 TABLET ORAL DAILY
Status: DISCONTINUED | OUTPATIENT
Start: 2020-12-24 | End: 2020-12-24 | Stop reason: HOSPADM

## 2020-12-24 RX ADMIN — ACETAMINOPHEN 650 MG: 325 TABLET ORAL at 05:21

## 2020-12-24 RX ADMIN — HYDROCHLOROTHIAZIDE 12.5 MG: 25 TABLET ORAL at 08:04

## 2020-12-24 RX ADMIN — Medication 10 ML: at 08:03

## 2020-12-24 RX ADMIN — CARVEDILOL 12.5 MG: 12.5 TABLET, FILM COATED ORAL at 08:03

## 2020-12-24 ASSESSMENT — PAIN SCALES - GENERAL
PAINLEVEL_OUTOF10: 0
PAINLEVEL_OUTOF10: 9
PAINLEVEL_OUTOF10: 7

## 2020-12-24 ASSESSMENT — PAIN DESCRIPTION - LOCATION: LOCATION: GROIN

## 2020-12-24 ASSESSMENT — PAIN - FUNCTIONAL ASSESSMENT: PAIN_FUNCTIONAL_ASSESSMENT: 0-10

## 2020-12-24 ASSESSMENT — PAIN DESCRIPTION - ORIENTATION: ORIENTATION: RIGHT

## 2020-12-24 NOTE — ED NOTES
Neurological status remains WNL  Alert and oriented x4  Answers all questions appropriately     Andres Mittal, RN  12/23/20 4506

## 2020-12-24 NOTE — ED NOTES
Neurological status remains WNL  Alert and oriented x4  Answers all questions appropriately     Patient resting comfortably on stretcher, in no apparent distress  Respirations even and non-labored  Patient has no needs at this time  Call light remains within reach     Brit Santamaria RN  12/23/20 [address] [facility]

## 2020-12-24 NOTE — ED NOTES
Report received from Selvin Armenta, [facility], [facility]  All questions answered    Patient states she takes her lipitor at 2100 with her carvediolol  Provided with boxed lunch  RR even and non-labored  Remains on monitor  Updated on POC  No needs at this time     Brenton Flores RN  12/23/20 6568

## 2020-12-24 NOTE — PROGRESS NOTES
Physical Therapy    Facility/Department: [address] [place] [address] ED  Initial Assessment    NAME: Gayatri Calvillo  : 1945  MRN: [id number]    Date of Service: 2020  From H and P:Got home from the store today and was picking up a container of laundry detergent and then she fell backwards striking her head. She stated she was not sure if she had a loss of consciousness but remembers getting up from the ground. Presented to [facility], [facility] showed small SAH and she was transferred to [facility]. Discharge Recommendations:  Home with assist PRN   PT Equipment Recommendations  Equipment Needed: Yes  Mobility Devices: Liat January: Rolling    Assessment   Body structures, Functions, Activity limitations: Decreased functional mobility ; Decreased strength; Increased pain  Assessment: Despite right groin pain during weight bearing, Antione Gambino was able to ambulate slowly but steadily. Recommend wheeled walker since her walker that she used 20 years ago after her knee replacement is a standard walker. Will continue to treat for gait and stair training while here in acute care. Prognosis: Good  Decision Making: Low Complexity  Clinical Presentation: stable  PT Education: Goals;PT Role;General Safety; Functional Mobility Training; Adaptive Device Training; Injury Prevention;Transfer Training;Gait Training  REQUIRES PT FOLLOW UP: Yes  Activity Tolerance  Activity Tolerance: Patient limited by pain; Patient Tolerated treatment well       Patient Diagnosis(es): The encounter diagnosis was Subarachnoid hemorrhage ([address] has a past medical history of Diverticulosis of colon, Full dentures, History of colon polyps, Hyperlipidemia, Hypertension, Osteoarthritis, Osteoarthritis of hand, Osteoarthritis of lower leg, localized, Osteoporosis, Psoriasis, Sinus congestion, Tubulovillous adenoma of colon, and Wears glasses. has a past surgical history that includes Colonoscopy (6/14/2013); Colonoscopy (10/25/2013); joint replacement (Bilateral); Hysterectomy; skin biopsy; Knee Arthroplasty (Right, 12/8/14); Coronary artery bypass graft (12/22/15); pr colsc flx w/removal lesion by hot bx forceps (N/A, 4/22/2017); Colonoscopy (04/22/2017); Cardiac catheterization; and Colonoscopy (N/A, 6/16/2018). Restrictions  Position Activity Restriction  Other position/activity restrictions: Up with assistance  Vision/Hearing  Vision: (Denies any acute vision changes.)     Subjective  General  Chart Reviewed: Yes  Patient assessed for rehabilitation services?: Yes  Family / Caregiver Present: No  Follows Commands: Within Functional Limits  Pain Screening  Patient Currently in Pain: Yes  Pain Assessment  Pain Assessment: 0-10  Pain Level: 9  Pain Location: Groin  Pain Orientation: Right  Vital Signs  Pulse: 77  Patient Currently in Pain: Yes  Oxygen Therapy  SpO2: 93 %  O2 Device: None (Room air)       Orientation  Orientation  Overall Orientation Status: Within Functional Limits  Social/Functional History  Social/Functional History  Lives With: Alone  Type of Home: House  Home Layout: One level  Home Access: Stairs to enter with rails  Entrance Stairs - Number of Steps: 4  Entrance Stairs - Rails: Right  Home Equipment: Standard walker, Cane  ADL Assistance: Independent  Homemaking Assistance: Independent  Ambulation Assistance: Independent  Transfer Assistance: Independent  Active : Yes  Additional Comments: No additional falls in the last 6 months besides this one. Patient says she fell negotiating up her steps while carrying groceries.   Cognition   Cognition  Overall Cognitive Status: WFL    Objective     Observation/Palpation  Observation: Steps gently on right LE, but there is no overt limp or buckling of right LE during gait       Strength RLE  Strength RLE: Exception  R Hip Flexion: 3/5  R Hip Extension: 4-/5 R Ankle Dorsiflexion: 4/5  R Ankle Plantar flexion: 4/5  Strength LLE  Strength LLE: Exception  L Hip Flexion: 4/5  L Hip Extension: 4/5  L Ankle Dorsiflexion: 4/5  L Ankle Plantar Flexion: 4/5     Sensation  Overall Sensation Status: WFL  Bed mobility  Supine to Sit: Stand by assistance  Sit to Supine: Stand by assistance  Transfers  Sit to Stand: Stand by assistance  Stand to sit: Stand by assistance  Ambulation  Ambulation?: Yes  Ambulation 1  Surface: level tile  Device: Rolling Walker  Assistance: Contact guard assistance  Gait Deviations: Decreased step height;Slow Szuanne;Decreased step length  Distance: 105'  Comments: She was unable to use her cane at all. Pain in right groin area during RLE weight bearing. Balance  Sitting - Static: Good  Sitting - Dynamic: Good  Standing - Static: Fair;+  Standing - Dynamic: Fair  Exercises  Comments: bilateral heel slides to prepare joints for gait x10 reps     Plan   Plan  Times per week: 4-5x/wk  Current Treatment Recommendations: Stair training, Endurance Training, Gait Training, Patient/Caregiver Education & Training, Home Exercise Program, Pain Management  Safety Devices  Type of devices: Gait belt, All fall risk precautions in place, Left in bed, Call light within reach                        AM-PAC Score  AM-PAC Inpatient Mobility Raw Score : 20 (12/24/20 1036)  AM-PAC Inpatient T-Scale Score : 47.67 (12/24/20 1036)  Mobility Inpatient CMS 0-100% Score: 35.83 (12/24/20 1036)  Mobility Inpatient CMS G-Code Modifier : Paul Montana (12/24/20 1036)          Goals  Short term goals  Time Frame for Short term goals: 14 visits  Short term goal 1: Sit to/from stand with supervision. Short term goal 2: Ambulate 150' with wheeled walker with supervision. Short term goal 3: Negotiate up and down 4 steps with one railing with CGA.   Patient Goals   Patient goals : Get pain under control       Therapy Time   Individual Concurrent Group Co-treatment   Time In 5775 Time Out 1015         Minutes 25         Timed Code Treatment Minutes: Baldomero Everett, PT

## 2020-12-24 NOTE — ED NOTES
Neurological status remains WNL  Alert and oriented x4  Answers all questions appropriately     Addy Menon RN  12/23/20 9487

## 2020-12-24 NOTE — ED NOTES
Neurological status remains WNL  Alert and oriented x4  Answers all questions appropriately    Patient resting comfortably on stretcher, in no apparent distress  Respirations even and non-labored  Patient has no needs at this time  Call light remains within reach     Hill Gore RN  12/24/20 3344

## 2020-12-24 NOTE — ED NOTES
Report given to [facility], RN  All questions answered     Yenifer Walls RN  12/24/20 3490

## 2020-12-24 NOTE — ED NOTES
Patient resting comfortably on stretcher, in no apparent distress  Respirations even and non-labored  Patient has no needs at this time  Call light remains within reach     Andres Mittal RN  12/23/20 7890

## 2020-12-24 NOTE — ED NOTES
Neurological status remains WNL  Alert and oriented x4  Answers all questions appropriately     Brenton Flores RN  12/23/20 8534

## 2020-12-24 NOTE — CARE COORDINATION
SBIRT- completed        Alcohol Screening and Brief Intervention        No results for input(s): ALC in the last 72 hours. Alcohol Pre-screening     (WOMEN ONLY) How many times in the past year have you had 4 or more drinks in a day?: None    Alcohol Screening Audit       Drug Pre-Screening   How many times in the past year have you used a recreational drug or used a prescription medication for nonmedical reasons?: None    Drug Screening DAST       Mood Pre-Screening (PHQ-2)  During the past two weeks, have you been bothered by little interest or pleasure in doing things?: No  During the past two weeks, have you been bothered by feeling down, depressed, or hopeless?: No    Mood Pre-Screening (PHQ-9)         I have interviewed Dayo Saleem, [id number] regarding  Her alcohol consumption/drug use and risk for excessive use. Screenings were negative. Patient  N/A intervention at this time.    Deferred []    Completed on: 12/24/2020   FLORINA ALEJANDRO

## 2020-12-24 NOTE — ED NOTES
Morning blood work collected, labeled, and sent to lab    Patient provided with morning meal tray  No other needs at this time  RR even and non-labored  Call light remains within reach     Di Bynum RN  12/24/20 4730

## 2020-12-24 NOTE — CARE COORDINATION
Dispo planning     Met with the patient at the bedside. Pt states that she fell but cannot remember the details of the why she fell and just woke up on the ground. Plans to discharge from the ED pending NS recs. Pt normally lives alone and walks without any DME equipment. PT evals states the need for a rolling walker. DME ordered. Writer encouraged patient to have someone stay with her when she leaves the hospital due to the increase risk for another fall. Pt verbalized understanding. Reviewed post concussion precautions. Pt trying to find family member to stay with her temporarily. Speech Therapy recs no further therapy at discharge. CM and bedside nurse updated on the following.

## 2020-12-24 NOTE — ED NOTES
Patient resting comfortably on stretcher, in no apparent distress  Respirations even and non-labored  Patient has no needs at this time  Call light remains within reach     Talbert Brittle, RN  12/23/20 0729

## 2020-12-24 NOTE — ED NOTES
Neurological status remains WNL  Alert and oriented x4  Answers all questions appropriately     Faizan Nails RN  12/23/20 2725

## 2020-12-24 NOTE — PROGRESS NOTES
Repeat CT Head reviewed. Stable, small subarachnoid hemorrhage noted. Radiology documented question of a left orbital fracture with partial opacification of the maxillary sinus. Upon review of previous imaging from 2015 it appears this finding may have been present at that time, although imaging doesn't quite extend caudally enough to fully visualize as seen on today's CT. Additionally, patient has no pain or soft tissue trauma to the face and her mechanism would not be consistent with an injury to the orbit. Will hold off on additional imaging at this time. Full daily progress note to follow.       Electronically signed by Dana Wells DO on 12/24/2020 at 8:45 AM

## 2020-12-24 NOTE — PROGRESS NOTES
in the lower portion of the right SI joint as well as in the visualized lower lumbar spine, and at the symphysis. What appears to be an old IUD is projected over the upper right iliac wing. Surgical clips are present in the soft tissues medial to the right femur. Mild narrowing of the hip joint spaces and suggestion of subchondral cystic changes in the heads of each femur. No acute abnormality is identified in the hips. PHYSICAL EXAM:   GCS:  4 - Opens eyes on own   6 - Follows simple motor commands  5 - Alert and oriented    Pupil size:  Left 2 mm Right 2 mm  Pupil reaction: Yes  Wiggles fingers: Left Yes Right Yes  Hand grasp:   Left normal   Right normal  Wiggles toes: Left Yes    Right Yes  Plantar flexion: Left normal  Right normal      /66   Pulse 64   Temp 99.3 °F (37.4 °C) (Oral)   Resp 20   SpO2 94%   General appearance: alert, appears stated age and cooperative  Head: Normocephalic, without obvious abnormality, atraumatic  Eyes: conjunctivae/corneas clear. PERRL, EOM's intact. Fundi benign. Ears: normal TM's and external ear canals both ears  Nose: Nares normal. Septum midline. Mucosa normal. No drainage or sinus tenderness. Throat: lips, mucosa, and tongue normal; teeth and gums normal  Neck: no adenopathy, no carotid bruit, no JVD, supple, symmetrical, trachea midline and thyroid not enlarged, symmetric, no tenderness/mass/nodules  Back: symmetric, no curvature. ROM normal. No CVA tenderness. Lungs: clear to auscultation bilaterally  Heart: regular rate and rhythm, S1, S2 normal, no murmur, click, rub or gallop  Abdomen: soft, non-tender; bowel sounds normal; no masses,  no organomegaly  Extremities: extremities normal, atraumatic, no cyanosis or edema  Pulses: 2+ and symmetric  Skin: Skin color, texture, turgor normal. No rashes or lesions  Lymph nodes: Cervical, supraclavicular, and axillary nodes normal.  Neurologic: Alert and oriented X 3, normal strength and tone.  Normal symmetric reflexes. Normal coordination and gait     Spine:     Spine Tenderness ROM   Cervical 0 /10 Normal   Thoracic 0 /10 Normal   Lumbar 0 /10 Normal     Musculoskeletal    Joint Tenderness Swelling ROM   Right shoulder absent absent normal   Left shoulder absent absent normal   Right elbow present absent normal   Left elbow absent absent normal   Right wrist absent absent normal   Left wrist absent absent normal   Right hand grasp absent absent normal   Left hand grasp absent absent normal   Right hip present absent normal   Left hip absent absent normal   Right knee absent absent normal   Left knee absent absent normal   Right ankle absent absent normal   Left ankle absent absent normal   Right foot absent absent normal   Left foot absent absent normal           CONSULTS: Neurosurgery    PROCEDURES: None    INJURIES:        Patient Active Problem List   Diagnosis    Hypertension    Hyperlipidemia    Tubulovillous adenoma of colon    Psoriasis    Osteoporosis    Osteoarthritis of hand    Osteoarthritis of lower leg, localized    Failure of total knee arthroplasty ([facility] [address]    S/P total knee replacement    Osteoarthritis of lumbar spine    DJD (degenerative joint disease) of thoracic spine    Thyroid nodule    Hypokalemia    NSTEMI (non-ST elevated myocardial infarction) ([facility]    History of colon polyps    Syncope and collapse    CAD (coronary artery disease)    Stented coronary artery    Acute bilateral low back pain without sciatica    SAH (subarachnoid hemorrhage) ([facility])         Assessment/Plan:     1. SAH  1. Repeat CT Brain this morning  2. Follow-up neurosurgery recommendations  3. As needed Tylenol for pain control  4. Holding anticoagulation and DVT prophylaxis  2. Right elbow pain  1.  Obtain x-ray of the right elbow

## 2020-12-24 NOTE — ED NOTES
Neurological status remains WNL  Alert and oriented x4  Answers all questions appropriately     Kathy Koch RN  12/23/20 0765

## 2020-12-24 NOTE — ED NOTES
Neurological status remains WNL  Alert and oriented x4  Answers all questions appropriately     Rubbie Closs, RN  12/24/20 8011

## 2020-12-24 NOTE — PROGRESS NOTES
PROGRESS NOTE          PATIENT NAME: Rah CARDENAS [facility] RECORD NO. [id number]  DATE: 2020  SURGEON: Dr. Tank Villa: Gillian Kelsey MD    HD: # 1    ASSESSMENT    Patient Active Problem List   Diagnosis    Hypertension    Hyperlipidemia    Tubulovillous adenoma of colon    Psoriasis    Osteoporosis    Osteoarthritis of hand    Osteoarthritis of lower leg, localized    Failure of [address] ([facility] [address]    S/P total knee replacement    Osteoarthritis of lumbar spine    DJD (degenerative joint disease) of thoracic spine    Thyroid nodule    Hypokalemia    NSTEMI (non-ST elevated myocardial infarction) ([address]    History of colon polyps    Syncope and collapse    CAD (coronary artery disease)    Stented coronary artery    Acute bilateral low back pain without sciatica    SAH (subarachnoid hemorrhage) ([facility] [address]       MEDICAL DECISION MAKING AND PLAN    1. Small, right parafalcine SAH   1. CT this morning shows stable bleed   2. Routine neuro checks   3. Neurosurgery following, will follow further recommendations. 2. F/u morning labs         Chief Complaint: [address] was seen and examined. No acute events overnight. Reports some right hip pain with walking. Imaging is negative. She reports a slight headache overnight that was relieved with tylenol. She denies any nausea, vomiting, visual changes, numbness, tingling, or weakness. OBJECTIVE  VITALS: Temp: Temp: 99.3 °F (37.4 °C)Temp  Av.3 °F (37.4 °C)  Min: 99.3 °F (37.4 °C)  Max: 99.3 °F (49.0 °C) BP Systolic (94CWG), TKY:379 , Min:130 , OYT:492   Diastolic (02NYA), PPS:66, Min:61, Max:98   Pulse Pulse  Av.7  Min: 62  Max: 76 Resp Resp  Av.2  Min: 12  Max: 23 Pulse ox SpO2  Av.7 %  Min: 93 %  Max: 98 %  GENERAL: alert, cooperative, no distress  NEURO: no focal deficit   HEENT: no ecchymosis or deformity noted to the orbits or face. EOMI.  No pain or crepitus upon palpation of the face or orbits. Trachea midline   LUNGS: no acute respiratory distress or accessory muscle use   HEART: regular rate   ABDOMEN: soft, non-tender, non-distended   EXTREMITY: no cyanosis, clubbing or edema    No intake/output data recorded. Drain/tube output:  No intake/output data recorded. LAB:  CBC:   Recent Labs     12/23/20  1615   WBC 10.4   HGB 13.1   HCT 38.6   MCV 84.5        BMP:   Recent Labs     12/23/20  1615      K 3.9      CO2 27   BUN 17   CREATININE 0.83   GLUCOSE 111*     COAGS:   Recent Labs     12/23/20  1615   APTT 33.0   PROT 7.5   INR 1.0       RADIOLOGY:  Xr Elbow Right (min 3 Views)    Result Date: 12/24/2020  EXAMINATION: THREE XRAY VIEWS OF THE RIGHT ELBOW 12/24/2020 4:33 am COMPARISON: None. HISTORY: ORDERING SYSTEM PROVIDED HISTORY: left elbow pain s/p fall TECHNOLOGIST PROVIDED HISTORY: left elbow pain s/p fall Reason for Exam: fall rt elbow pain FINDINGS: Frontal, lateral and oblique views of the right elbow. Overlying IV tubing. Posterior elbow soft tissue swelling. No radiographic evidence of an elbow joint effusion. No acute fracture. Degenerative changes in the elbow. No aggressive skeletal lesion. No acute osseous abnormality in the right elbow. Ct Head Wo Contrast    Result Date: 12/23/2020  EXAMINATION: CT OF THE HEAD WITHOUT CONTRAST  12/23/2020 3:56 pm TECHNIQUE: CT of the head was performed without the administration of intravenous contrast. Dose modulation, iterative reconstruction, and/or weight based adjustment of the mA/kV was utilized to reduce the radiation dose to as low as reasonably achievable. COMPARISON: None HISTORY: ORDERING SYSTEM PROVIDED HISTORY: fall TECHNOLOGIST PROVIDED HISTORY: fall Reason for Exam: fall hit back of head Acuity: Unknown Type of Exam: Unknown FINDINGS: BRAIN/VENTRICLES: There is no mass effect or midline shift.   Small focus of subarachnoid hemorrhage is present in the para falcine area in the high frontal area of 9.2 x 4.1 mm. No abnormal extra-axial fluid collection. The gray-white differentiation is maintained without evidence of an acute infarct. There is no evidence of hydrocephalus. Scattered hypodensity is present in the white matter consistent with chronic microvascular change. ORBITS: The visualized portion of the orbits demonstrate no acute abnormality. SINUSES: Mild sinus inflammation left maxillary sinus. Other paranasal sinuses and mastoid air cells are appropriately aerated. SOFT TISSUES/SKULL:  Soft tissue laceration, edema and hematoma is present in the high right posterior parietal scalp, area of involvement approximately 5.4 x 2.6 cm. No acute osseous calvarial abnormality. 1.  Small focus of subarachnoid hemorrhage in the right para falcine area frontal region of 9.2 x 4.1 mm without mass effect. No intraparenchymal hemorrhage. 2.  Scalp soft tissue laceration edema and hematoma high right posterior parietal scalp of 2.6 x 5.4 cm. 3.  Senescent changes including chronic microvascular change. 4.  No acute calvarial abnormality. Critical results were called by Dr. Zunilda Meadows MD to [address] on 12/23/2020 at 16:04. Xr Hip 2-3 Vw W Pelvis Right    Result Date: 12/23/2020  EXAMINATION: ONE XRAY VIEW OF THE PELVIS AND TWO XRAY VIEWS RIGHT HIP 12/23/2020 4:10 pm COMPARISON: None. HISTORY: ORDERING SYSTEM PROVIDED HISTORY: fall, pain TECHNOLOGIST PROVIDED HISTORY: fall, pain Reason for Exam: fall, right hip pain Acuity: Unknown Type of Exam: Unknown FINDINGS: There is mild narrowing of the bilateral hip joint spaces. Normal alignment. Suggestion of subchondral cystic changes in the heads of both right and left femur, somewhat greater right femur. No evidence of fracture or cortical irregularity. There is some degenerative changes in the lower portion of the right SI joint as well as in the visualized lower lumbar spine, and at the symphysis.   What appears to be an old IUD is projected over the upper right iliac wing. Surgical clips are present in the soft tissues medial to the right femur. Mild narrowing of the hip joint spaces and suggestion of subchondral cystic changes in the heads of each femur. No acute abnormality is identified in the hips. Jose Reese, DO  12/24/20, 7:28 AM               Trauma Attending Angie Clifford      I have reviewed the above [facility] note(s) and confirmed the key elements of the medical history and physical exam. I have seen and examined the pt. I have discussed the findings, established the care plan and recommendations with Resident, [facility] RN, bedside nurse.   Fall from standing hitting post head   No pain on anterior face  Repeat CT stable with no change   NS recs   dispo planning   May need walker instead of cane       Andra Dc,   12/24/2020  11:42 AM

## 2020-12-24 NOTE — ED NOTES
Neurological status remains WNL  Alert and oriented x4  Answers all questions appropriately     John Motley RN  12/24/20 9487

## 2020-12-24 NOTE — PROGRESS NOTES
Neurosurgery ELVIN/Resident    Daily Progress Note   Chief Complaint   Patient presents with    Fall     fall from standing at 11am on plavix and asa, [facility]C transfer     12/24/2020  12:54 PM  Seymour Anibal yesterday while carrying groceries into her house. Unsure LOC. Denies any preceding symptoms. Chart reviewed. No acute events overnight. No new complaints. Denies headache, nausea and vomiting. Vitals:    12/24/20 0630 12/24/20 0631 12/24/20 1027 12/24/20 1233   BP:  138/63  138/76   Pulse:  74 77 66   Resp: 19   16   Temp:    99.3 °F (37.4 °C)   TempSrc:    Oral   SpO2:  95% 93% 94%         PE:   AOx3   PERRL, EOMI   Motor   L deltoid 5/5; R deltoid 5/5  L biceps 5/5; R biceps 5/5  L triceps 5/5; R triceps 5/5  L wrist extension 5/5; R wrist extension 5/5  L intrinsics 5/5; R intrinsics 5/5      L iliopsoas 5/5 , R iliopsoas 5/5  L quadriceps 5/5; R quadriceps 5/5  L Dorsiflexion 5/5; R dorsiflexion 5/5  L Plantarflexion 5/5; R plantarflexion 5/5  L EHL 5/5; R EHL 5/5    Sensation intact      Lab Results   Component Value Date    WBC 10.4 12/23/2020    HGB 13.1 12/23/2020    HCT 38.6 12/23/2020     12/23/2020    CHOL 179 04/19/2018    TRIG 328 (H) 04/19/2018    HDL 44 05/19/2020    LDLDIRECT 84 10/17/2017    ALT 16 12/23/2020    AST 21 12/23/2020     12/24/2020    K 3.7 12/24/2020     12/24/2020    CREATININE 0.71 12/24/2020    BUN 13 12/24/2020    CO2 23 12/24/2020    TSH 0.82 07/08/2019    INR 1.0 12/23/2020    LABA1C 5.7 12/19/2015    CRP 4.6 11/24/2014    SEDRATE 23 (H) 11/24/2014       Radiology   Ct Head Wo Contrast    Result Date: 12/24/2020  EXAMINATION: CT OF THE HEAD WITHOUT CONTRAST  12/24/2020 6:30 am TECHNIQUE: CT of the head was performed without the administration of intravenous contrast. Dose modulation, iterative reconstruction, and/or weight based adjustment of the mA/kV was utilized to reduce the radiation dose to as low as reasonably achievable.  COMPARISON: 12/23/2020 HISTORY: ORDERING SYSTEM PROVIDED HISTORY: SAH re-eval TECHNOLOGIST PROVIDED HISTORY: SAH re-eval Reason for Exam: SAH re-eval Acuity: Unknown Type of Exam: Subsequent/Follow-up FINDINGS: BRAIN/VENTRICLES: A small area of acute subarachnoid hemorrhage overlying the anterior right frontal lobe measuring approximately 9 x 4 mm, stable from prior exam. No new areas of acute intracranial hemorrhage. No intraparenchymal hemorrhage. No additional extra-axial fluid collection or subdural hematoma. No mass effect or midline shift. No acute hydrocephalus. No evidence of acute intracranial infarct. There are nonspecific areas of hypoattenuation within the periventricular and subcortical white matter, which likely represent chronic microvascular ischemic change. ORBITS: The visualized portion of the orbits demonstrate no acute abnormality. SINUSES: Partial opacification of the left maxillary sinus with blood products associated with a suspected fracture, nondisplaced in inferior wall left orbit. SOFT TISSUES/SKULL: Soft tissue injury with blood products in the superficial soft tissues overlying the right parietal calvarium, similar to the prior exam.  No calvarial fracture. 1. Stable small amount of subarachnoid hemorrhage anterior to the right frontal lobe. 2. No new areas of acute intracranial hemorrhage. 3. Questionable fracture in the floor of the left orbit. Partial opacification of the left maxillary sinus. Maxillofacial CT should be considered. RECOMMENDATIONS: Maxillofacial CT. Ct Head Wo Contrast    Result Date: 12/23/2020  EXAMINATION: CT OF THE HEAD WITHOUT CONTRAST  12/23/2020 3:56 pm TECHNIQUE: CT of the head was performed without the administration of intravenous contrast. Dose modulation, iterative reconstruction, and/or weight based adjustment of the mA/kV was utilized to reduce the radiation dose to as low as reasonably achievable.  COMPARISON: None HISTORY: ORDERING SYSTEM PROVIDED HISTORY: fall

## 2020-12-24 NOTE — ED NOTES
Patient return from CT via stretcher  Placed back on monitor    Neurological status remains WNL  Alert and oriented x4  Answers all questions appropriately    No other needs at this time  RR even and non-labored     Nader Ivy RN  12/24/20 0147

## 2020-12-24 NOTE — CARE COORDINATION
Niurka Arthur was evaluated today and a DME order was entered for a wheeled walker because she requires this to successfully complete daily living tasks of ambulating. A wheeled walker is necessary due to the patient's unsteady gait, upper body weakness, and inability to  an ambulation device; and she can ambulate only by pushing a walker instead of a lesser assistive device such as a cane, crutch, or standard walker. The need for this equipment was discussed with the patient and she understands and is in agreement.

## 2020-12-24 NOTE — ED NOTES
Neurological status remains WNL  Alert and oriented x4  Answers questions appropriately    Patient resting comfortably on stretcher, in no apparent distress  Respirations even and non-labored  Patient has no needs at this time  Call light remains within reach     Alvaro Domínguez RN  12/24/20 6038

## 2020-12-24 NOTE — ED NOTES
Neurological status remains WNL  Alert and oriented x4  Answers all questions appropriately     Luther Maddox, RN  12/23/20 4011

## 2020-12-24 NOTE — ED NOTES
Neurological status remains WNL  Alert and oriented x4  Answers all questions appropriately     Debbie Nelson RN  12/23/20 2906

## 2020-12-24 NOTE — PROGRESS NOTES
Pt admitted into room 424. Pt able to ambulate to to bed with walker. Vitals obtained and assessment completed. Bed locked and in lowest position with bed alarm on. Call light within reach.

## 2020-12-24 NOTE — ED NOTES
Neurological status remains WNL  Alert and oriented x4  Answers all questions appropriately     Patient up to use restroom; tolerated well with one-assist  Taken to restroom with wheelchair; unsteady on feet  Back to room and placed back on monitor  No other needs at this time  RR even and non-labored  Call light remains within reach     Rubbie Closs, RN  12/24/20 4377

## 2020-12-24 NOTE — PROGRESS NOTES
Speech Language Pathology  Facility/Department: [facility] ED  Initial Speech/Language/Cognitive Assessment    NAME: Aida Guillen  : 1945   MRN: [id number]  ADMISSION DATE: 2020  ADMITTING DIAGNOSIS: has Hypertension; Hyperlipidemia; Tubulovillous adenoma of colon; Psoriasis; Osteoporosis; Osteoarthritis of hand; Osteoarthritis of lower leg, localized; Failure of total knee arthroplasty ([facility] [address] S/P total knee replacement; Osteoarthritis of lumbar spine; DJD (degenerative joint disease) of thoracic spine; Thyroid nodule; Hypokalemia; NSTEMI (non-ST elevated myocardial infarction) ([address] History of colon polyps; Syncope and collapse; CAD (coronary artery disease); Stented coronary artery; Acute bilateral low back pain without sciatica; and SAH (subarachnoid hemorrhage) ([facility] [address] on their problem list.    Date of Eval: 2020   Evaluating Therapist: CORWIN Leach    RECENT RESULTS  CT OF HEAD: (  2020  )    Impression   1. Stable small amount of subarachnoid hemorrhage anterior to the right   frontal lobe. 2. No new areas of acute intracranial hemorrhage. 3. Questionable fracture in the floor of the left orbit.  Partial   opacification of the left maxillary sinus.  Maxillofacial CT should be   considered.       RECOMMENDATIONS:   Maxillofacial CT.         Primary Complaint: Per chart, Got home from the store today and was picking up a container of laundry detergent and then she fell backwards striking her head. She stated she was not sure if she had a loss of consciousness but remembers getting up from the ground. Presented to [facility] showed small SAH and she was transferred to [facility]. She is not complaining of any current headache or spine tenderness. Complaining of some mild right hip pain, xray negative for any acute pathology. She is on ASA and Plavix from CABG 5 years ago.     Pain:  Pain Assessment  Pain Assessment: 0-10  Pain Level: 0 Assessment: Pt presents with no apparent cognitive deficits at this time. No dysarthria noted, no oral motor deficits. No further ST is recommended. Verbal education provided. Recommendations:  Requires SLP Intervention: No     D/C Recommendations: No therapy recommended at discharge. Subjective:  General  Chart Reviewed: Yes  Family / Caregiver Present: No     Vision  Vision: Within Functional Limits  Hearing  Hearing: Within functional limits     Objective:     Oral/Motor  Oral Motor: Within functional limits    Auditory Comprehension  Comprehension: Within Functional Limits    Expression  Primary Mode of Expression: Verbal    Verbal Expression  Verbal Expression: Within functional limits    Motor Speech  Motor Speech:  Within Functional Limits    Cognition:      Orientation  Overall Orientation Status: Within Normal Limits  Attention  Attention: Within Functional Limits  Memory  Memory: Within Funtional Limits  Problem Solving  Problem Solving: Within Functional Limits  Abstract Reasoning  Abstract Reasoning: Within Functional Limits  Safety/Judgement  Safety/Judgement: Within Functional Limits  Verbal Sequencing: WFL  Thought Organization: WFL  Word Generation: WFL    Prognosis:  Speech Therapy Prognosis  Prognosis: Good  Individuals consulted  Consulted and agree with results and recommendations: Patient    Education:  Patient Education: yes  Patient Education Response: Verbalizes understanding          Therapy Time:   Individual Concurrent Group Co-treatment   Time In 9261         Time Out 0858         Minutes Miesha Rose, M.SPaul [id number] [facility]    12/24/2020 10:47 AM

## 2020-12-24 NOTE — ED NOTES
Neurological status remains WNL  Alert and oriented x4  Answers all questions appropriately     Alcides Lawton RN  12/23/20 9883

## 2020-12-25 NOTE — PLAN OF CARE
Problem: IP BALANCE  Goal: BALANCE EDUCATION  Description: Educate patients on maintaining dynamic/static standing/sitting balance, with/without upper extremity support.   12/24/2020 1409 by Brendon Ortega, RN  Outcome: Completed     Problem: IP MOBILITY  Goal: LTG - patient will ambulate community distance  12/24/2020 1409 by Brendon Ortega, RN  Outcome: Completed

## 2020-12-25 NOTE — PROGRESS NOTES
Day shift RN went over discharge instructions with the patient. All questions were answered. IV was removed by day shift RN. Belongings were collected and the patient was given their discharge instructions. The patient was transferred to be discharged via wheelchair.  Electronically signed by Ezequiel Kern RN on 12/24/2020 at 8:26 PM

## 2020-12-27 NOTE — DISCHARGE SUMMARY
DISCHARGE SUMMARY:    PATIENT NAME:  Aida Guillen  YOB: 1945  MEDICAL RECORD NO. [id number]  DATE: 12/27/20  PRIMARY CARE PHYSICIAN: Ricky Ruiz MD  ADMIT DATE:  12/23/2020    DISCHARGE DATE:  12/24/2020  DISPOSITION:  Home  ADMITTING DIAGNOSIS:   Fall, [address]     DIAGNOSIS:   Patient Active Problem List   Diagnosis    Hypertension    Hyperlipidemia    Tubulovillous adenoma of colon    Psoriasis    Osteoporosis    Osteoarthritis of hand    Osteoarthritis of lower leg, localized    Failure of total knee arthroplasty ([facility]    S/P total knee replacement    Osteoarthritis of lumbar spine    DJD (degenerative joint disease) of thoracic spine    Thyroid nodule    Hypokalemia    NSTEMI (non-ST elevated myocardial infarction) ([facility])    History of colon polyps    Syncope and collapse    CAD (coronary artery disease)    Stented coronary artery    Acute bilateral low back pain without sciatica    SAH (subarachnoid hemorrhage) ([facility]       CONSULTANTS:  Neurosurgery    PROCEDURES:   None    HOSPITAL COURSE:   Aida Guillen is a 66 y.o. female who was admitted on 12/23/2020  Hospital Course:  Patient presented to the emergency department after a fall from standing height. She fell backwards with an unknown LOC. Patient was initially seen at [facility] and was transferred to [facility]. V's after she had a CT of the head that showed a small SAH. No additional acute traumatic injuries were identified with further work-up. Neurosurgery evaluated the patient. Repeat CT showed a stable bleed. Labs and imaging were followed daily. On day of discharge Aida Guillen  was tolerating a regular diet  had adequate analgeia on oral medications  had no signs of complication. She was deemed medically stable for discharged to Home        PHYSICAL EXAMINATION:        Discharge Vitals:  oral temperature is 99.3 °F (37.4 °C). Her blood pressure is 138/76 and her pulse is 66.  Her respiration is 16 and oxygen saturation is 94%. Exam on day of discharge:  VITALS: Temp: Temp: 99.3 °F (37.4 °C)Temp  Av.3 °F (37.4 °C)  Min: 99.3 °F (37.4 °C)  Max: 99.3 °F (37.7 °C) BP Systolic (12XJW), JRJ:910 , Min:130 , DMJ:076   Diastolic (28FUV), ZWI:52, Min:61, Max:98   Pulse Pulse  Av.7  Min: 62  Max: 76 Resp Resp  Av.2  Min: 12  Max: 23 Pulse ox SpO2  Av.7 %  Min: 93 %  Max: 98 %  GENERAL: alert, cooperative, no distress  NEURO: no focal deficit   HEENT: no ecchymosis or deformity noted to the orbits or face. EOMI. No pain or crepitus upon palpation of the face or orbits. Trachea midline   LUNGS: no acute respiratory distress or accessory muscle use   HEART: regular rate   ABDOMEN: soft, non-tender, non-distended   EXTREMITY: no cyanosis, clubbing or edema    LABS:   No results for input(s): WBC, HGB, HCT, PLT, NA, K, CL, CO2, BUN, CREATININE in the last 72 hours. DIAGNOSTIC TESTS:    Xr Elbow Right (min 3 Views)    Result Date: 2020  EXAMINATION: THREE XRAY VIEWS OF THE RIGHT ELBOW 2020 4:33 am COMPARISON: None. HISTORY: ORDERING SYSTEM PROVIDED HISTORY: left elbow pain s/p fall TECHNOLOGIST PROVIDED HISTORY: left elbow pain s/p fall Reason for Exam: fall rt elbow pain FINDINGS: Frontal, lateral and oblique views of the right elbow. Overlying IV tubing. Posterior elbow soft tissue swelling. No radiographic evidence of an elbow joint effusion. No acute fracture. Degenerative changes in the elbow. No aggressive skeletal lesion. No acute osseous abnormality in the right elbow. Ct Head Wo Contrast    Result Date: 2020  EXAMINATION: CT OF THE HEAD WITHOUT CONTRAST  2020 6:30 am TECHNIQUE: CT of the head was performed without the administration of intravenous contrast. Dose modulation, iterative reconstruction, and/or weight based adjustment of the mA/kV was utilized to reduce the radiation dose to as low as reasonably achievable.  COMPARISON: 2020 HISTORY: ORDERING SYSTEM PROVIDED HISTORY: SAH re-eval TECHNOLOGIST PROVIDED HISTORY: SAH re-eval Reason for Exam: SAH re-eval Acuity: Unknown Type of Exam: Subsequent/Follow-up FINDINGS: BRAIN/VENTRICLES: A small area of acute subarachnoid hemorrhage overlying the anterior right frontal lobe measuring approximately 9 x 4 mm, stable from prior exam. No new areas of acute intracranial hemorrhage. No intraparenchymal hemorrhage. No additional extra-axial fluid collection or subdural hematoma. No mass effect or midline shift. No acute hydrocephalus. No evidence of acute intracranial infarct. There are nonspecific areas of hypoattenuation within the periventricular and subcortical white matter, which likely represent chronic microvascular ischemic change. ORBITS: The visualized portion of the orbits demonstrate no acute abnormality. SINUSES: Partial opacification of the left maxillary sinus with blood products associated with a suspected fracture, nondisplaced in inferior wall left orbit. SOFT TISSUES/SKULL: Soft tissue injury with blood products in the superficial soft tissues overlying the right parietal calvarium, similar to the prior exam.  No calvarial fracture. 1. Stable small amount of subarachnoid hemorrhage anterior to the right frontal lobe. 2. No new areas of acute intracranial hemorrhage. 3. Questionable fracture in the floor of the left orbit. Partial opacification of the left maxillary sinus. Maxillofacial CT should be considered. RECOMMENDATIONS: Maxillofacial CT. Ct Head Wo Contrast    Result Date: 12/23/2020  EXAMINATION: CT OF THE HEAD WITHOUT CONTRAST  12/23/2020 3:56 pm TECHNIQUE: CT of the head was performed without the administration of intravenous contrast. Dose modulation, iterative reconstruction, and/or weight based adjustment of the mA/kV was utilized to reduce the radiation dose to as low as reasonably achievable.  COMPARISON: None HISTORY: ORDERING SYSTEM PROVIDED HISTORY: fall TECHNOLOGIST PROVIDED HISTORY: fall Reason for Exam: fall hit back of head Acuity: Unknown Type of Exam: Unknown FINDINGS: BRAIN/VENTRICLES: There is no mass effect or midline shift. Small focus of subarachnoid hemorrhage is present in the para falcine area in the high frontal area of 9.2 x 4.1 mm. No abnormal extra-axial fluid collection. The gray-white differentiation is maintained without evidence of an acute infarct. There is no evidence of hydrocephalus. Scattered hypodensity is present in the white matter consistent with chronic microvascular change. ORBITS: The visualized portion of the orbits demonstrate no acute abnormality. SINUSES: Mild sinus inflammation left maxillary sinus. Other paranasal sinuses and mastoid air cells are appropriately aerated. SOFT TISSUES/SKULL:  Soft tissue laceration, edema and hematoma is present in the high right posterior parietal scalp, area of involvement approximately 5.4 x 2.6 cm. No acute osseous calvarial abnormality. 1.  Small focus of subarachnoid hemorrhage in the right para falcine area frontal region of 9.2 x 4.1 mm without mass effect. No intraparenchymal hemorrhage. 2.  Scalp soft tissue laceration edema and hematoma high right posterior parietal scalp of 2.6 x 5.4 cm. 3.  Senescent changes including chronic microvascular change. 4.  No acute calvarial abnormality. Critical results were called by Dr. John Lozano MD to [address] on 12/23/2020 at 16:04. Xr Hip 2-3 Vw W Pelvis Right    Result Date: 12/23/2020  EXAMINATION: ONE XRAY VIEW OF THE PELVIS AND TWO XRAY VIEWS RIGHT HIP 12/23/2020 4:10 pm COMPARISON: None. HISTORY: ORDERING SYSTEM PROVIDED HISTORY: fall, pain TECHNOLOGIST PROVIDED HISTORY: fall, pain Reason for Exam: fall, right hip pain Acuity: Unknown Type of Exam: Unknown FINDINGS: There is mild narrowing of the bilateral hip joint spaces. Normal alignment.  Suggestion of subchondral cystic changes in the heads of both right and left femur, somewhat greater right femur. No evidence of fracture or cortical irregularity. There is some degenerative changes in the lower portion of the right SI joint as well as in the visualized lower lumbar spine, and at the symphysis. What appears to be an old IUD is projected over the upper right iliac wing. Surgical clips are present in the soft tissues medial to the right femur. Mild narrowing of the hip joint spaces and suggestion of subchondral cystic changes in the heads of each femur. No acute abnormality is identified in the hips. DISCHARGE INSTRUCTIONS     Discharge Medications:        Medication List      CONTINUE taking these medications    acetaminophen 325 MG tablet  Commonly known as: TYLENOL     alendronate 70 MG tablet  Commonly known as: FOSAMAX  TAKE 1 TABLET BY MOUTH ONCE WEEKLY ON AN EMPTY STOMACH BEFORE BREAKFAST. REMAIN UPRIGHT FOR 30 MINUTES & TAKE WITH 8 OUNCES OF WATER     ascorbic acid 500 MG tablet  Commonly known as: VITAMIN C     atorvastatin 20 MG tablet  Commonly known as: LIPITOR  Take 1 tablet by mouth daily     CALCIUM + D PO     carvedilol 12.5 MG tablet  Commonly known as: COREG  Take 1 tablet by mouth 2 times daily     fenofibrate 48 MG tablet  Commonly known as: TRICOR  TAKE ONE TABLET BY MOUTH DAILY     hydroCHLOROthiazide 12.5 MG tablet  Commonly known as: HYDRODIURIL     nitroGLYCERIN 0.4 MG SL tablet  Commonly known as: Nitrostat  Place 1 tablet under the tongue every 5 minutes as needed for Chest pain up to max of 3 total doses. If no relief after 1 dose, call 911. therapeutic multivitamin-minerals tablet     Vitamin D 1000 units Caps capsule  Commonly known as: CHOLECALCIFEROL        STOP taking these medications    aspirin 81 MG EC tablet     clopidogrel 75 MG tablet  Commonly known as: PLAVIX         Medication Instructions: You may resume aspirin on 12/28/2020. Follow up with Neurosurgery. They will determine when it is safe for you to resume your Plavix. Diet: No diet orders on file diet as tolerated  Activity: As instructed WEIGHT BEARING STATUS: Weight bearing as tolerated  Wound Care: Daily and as needed. DISPOSITION: Home    Follow-up:  Filemon Connelly, [address]  [phone]    Schedule an appointment as soon as possible for a visit  Follow up with PCP re: hosptial visit     Nayeli Snell, [address] [facility]  MOB # [phone]    In 1 week  Follow up with Neurosurgery with repeat CT head        SIGNED:  Shannan Peres DO   12/27/2020, 9:36 AM  Time Spent for discharge: 35 minutes            Trauma Attending Attestation      I have reviewed the above GCS note(s) and confirmed the key elements of the medical history and physical exam. I have seen and examined the pt. I have discussed the findings, established the care plan and recommendations with Resident, GCS RN, bedside nurse.         Simone Scott DO  12/27/2020  10:28 AM

## 2020-12-28 ENCOUNTER — TELEPHONE (OUTPATIENT)
Dept: INTERNAL MEDICINE CLINIC | Age: 75
End: 2020-12-28

## 2020-12-28 NOTE — TELEPHONE ENCOUNTER
[facility] Transitions Initial Follow Up Call    Outreach made within 2 business days of discharge: yes      Patient: Jose A Ortega Patient : 1945   MRN: [id number]  Reason for Admission: There are no discharge diagnoses documented for the most recent discharge. Discharge Date: 20       Spoke with: left message to call office. Discharge department/facility: [facility] Interactive Patient Contact:  Was patient able to fill all prescriptions:   Was patient instructed to bring all medications to the follow-up visit:   Is patient taking all medications as directed in the discharge summary?      Does patient understand their discharge instructions:     Does patient have questions or concerns that need addressed prior to 7-14 day follow up office visit:  Scheduled appointment with PCP within 7-14 days    Follow Up  Future Appointments   Date Time Provider Hilary Garcia   2020  4:45 PM [facility] CT RM 1 STCZ CT SCAN [facility] Radiolog   5/10/2021  1:00 PM Rhina Lomeli MD [address] [place] [address]

## 2020-12-30 ENCOUNTER — HOSPITAL ENCOUNTER (OUTPATIENT)
Dept: CT IMAGING | Age: 75
Discharge: HOME | End: 2021-01-01
Payer: MEDICARE

## 2020-12-30 DIAGNOSIS — I60.9 SUBARACHNOID HEMORRHAGE (HCC): ICD-10-CM

## 2020-12-30 PROCEDURE — 70450 CT HEAD/BRAIN W/O DYE: CPT

## 2020-12-31 ENCOUNTER — TELEPHONE (OUTPATIENT)
Dept: NEUROSURGERY | Age: 75
End: 2020-12-31

## 2020-12-31 NOTE — TELEPHONE ENCOUNTER
Impression   No acute intracranial abnormality; small intracranial bleed seen previously   has resolved.       Redemonstration of right parietal scalp hematoma.         Patient would like results. Patient was taken off plavix and aspirin in hospital. Told she could restart aspirin on the 28th. Patient asking when she can restart plavix?

## 2021-01-12 ENCOUNTER — OFFICE VISIT (OUTPATIENT)
Dept: NEUROSURGERY | Age: 76
End: 2021-01-12
Payer: MEDICARE

## 2021-01-12 VITALS
WEIGHT: 157 LBS | BODY MASS INDEX: 29.64 KG/M2 | HEART RATE: 61 BPM | OXYGEN SATURATION: 97 % | HEIGHT: 61 IN | SYSTOLIC BLOOD PRESSURE: 136 MMHG | DIASTOLIC BLOOD PRESSURE: 76 MMHG

## 2021-01-12 DIAGNOSIS — S00.03XD HEMATOMA OF SCALP, SUBSEQUENT ENCOUNTER: ICD-10-CM

## 2021-01-12 DIAGNOSIS — I60.9 SAH (SUBARACHNOID HEMORRHAGE) (HCC): Primary | ICD-10-CM

## 2021-01-12 PROCEDURE — 99213 OFFICE O/P EST LOW 20 MIN: CPT | Performed by: NURSE PRACTITIONER

## 2021-01-12 NOTE — PROGRESS NOTES
Joby Roberts  [facility] [address]  [address] [facility] [address]  Dept: [phone]    Patient:  Chasidy Keys  YOB: 1945  Date: 1/12/21    The patient is a 66 y.o. female who presents today for consult of the following problems:     Chief Complaint   Patient presents with    Follow-up     Ct results         HPI:     Chasidy Keys is a 66 y.o. female who presents to the hospital for follow-up of traumatic subarachnoid hemorrhage. Patient fell, striking the right posterior aspect of her head resulting in small frontal subarachnoid hemorrhage, as well as posterior scalp hematoma. Patient does take aspirin and Plavix, has restarted both following stable repeat CT scan. Overall feels completely back to normal.  Denies any headaches, vision changes, difficulty with speech. No nausea, vomiting. Denies any weakness, numbness or tingling. No seizures or seizure-like activity.     History:     Past Medical History:   Diagnosis Date    Diverticulosis of colon     Full dentures     History of colon polyps 04/22/2017    Hyperlipidemia     Hypertension     Osteoarthritis     Osteoarthritis of hand     Osteoarthritis of lower leg, localized     Osteoporosis     Psoriasis     ON TOP & BACK OF  HEAD    Sinus congestion     IN AM    Tubulovillous adenoma of colon     Wears glasses      Past Surgical History:   Procedure Laterality Date    CARDIAC CATHETERIZATION      2 stents    COLONOSCOPY  6/14/2013    diverticulosis, tubulovillous adenoma, questionable prominent mucosa anorectal area, focus of superficial denudation consistent with focal active colitis    COLONOSCOPY  10/25/2013    residual polyp lower right colon, another polyp in the cecum, biopsy benign    COLONOSCOPY  04/22/2017    Total colonoscopy and biopsy and cauterization of polyps, pathology-sessile serrated adenoma x3    COLONOSCOPY N/A 6/16/2018    COLONOSCOPY POLYPECTOMY HOT BIOPSY performed by David Vigil MD at [address]  12/22/15    OP CABG X 5- Dr Sidney Slaughterid Bilateral     knee, LT. KNEE X 2     KNEE ARTHROPLASTY Right 12/8/14    WI COLSC FLX W/REMOVAL LESION BY HOT BX FORCEPS N/A 4/22/2017    COLONOSCOPY POLYPECTOMY HOT BIOPSY performed by David Vigil MD at [facility] ([facility])     Family History   Problem Relation Age of Onset    Cancer Mother         lymphoma    Prostate Cancer Brother     Breast Cancer Sister      Current Outpatient Medications on File Prior to Visit   Medication Sig Dispense Refill    alendronate (FOSAMAX) 70 MG tablet TAKE 1 TABLET BY MOUTH ONCE WEEKLY ON AN EMPTY STOMACH BEFORE BREAKFAST. REMAIN UPRIGHT FOR 30 MINUTES & TAKE WITH 8 OUNCES OF WATER 12 tablet 2    fenofibrate (TRICOR) 48 MG tablet TAKE ONE TABLET BY MOUTH DAILY 30 tablet 0    hydrochlorothiazide (HYDRODIURIL) 12.5 MG tablet Take 12.5 mg by mouth daily      carvedilol (COREG) 12.5 MG tablet Take 1 tablet by mouth 2 times daily 180 tablet 0    atorvastatin (LIPITOR) 20 MG tablet Take 1 tablet by mouth daily 90 tablet 1    nitroGLYCERIN (NITROSTAT) 0.4 MG SL tablet Place 1 tablet under the tongue every 5 minutes as needed for Chest pain up to max of 3 total doses. If no relief after 1 dose, call 911. 25 tablet 3    Vitamin D (CHOLECALCIFEROL) 1000 UNITS CAPS capsule Take 1,000 Units by mouth daily.  Multiple Vitamins-Minerals (THERAPEUTIC MULTIVITAMIN-MINERALS) tablet Take 1 tablet by mouth daily.  Calcium Carbonate-Vitamin D (CALCIUM + D PO) Take 500 mg by mouth daily.  ascorbic acid (VITAMIN C) 500 MG tablet Take 500 mg by mouth daily.  acetaminophen (TYLENOL) 325 MG tablet Take 650 mg by mouth every 6 hours as needed. No current facility-administered medications on file prior to visit.       Social History     Tobacco Use    Smoking status: Never Smoker    Smokeless tobacco: Never Used   Substance Use Topics    Alcohol use: No     Alcohol/week: 0.0 standard drinks    Drug use: No       No Known Allergies    Review of Systems  Constitutional: Negative for activity change and appetite change. HENT: Negative for ear pain and facial swelling. Eyes: Negative for discharge and itching. Respiratory: Negative for choking and chest tightness. Cardiovascular: Negative for chest pain and leg swelling. Gastrointestinal: Negative for nausea and abdominal pain. Endocrine: Negative for cold intolerance and heat intolerance. Genitourinary: Negative for frequency and flank pain. Musculoskeletal: Negative for myalgias and joint swelling. Skin: Negative for rash and wound. Allergic/Immunologic: Negative for environmental allergies and food allergies. Hematological: Negative for adenopathy. Does not bruise/bleed easily. Psychiatric/Behavioral: Negative for self-injury. The patient is not nervous/anxious. Physical Exam:      /76 (Site: Right Upper Arm, Position: Sitting, Cuff Size: Medium Adult)   Pulse 61   Ht 5' 1\" (1.549 m)   Wt 157 lb (71.2 kg)   SpO2 97%   BMI 29.66 kg/m²   Estimated body mass index is 29.66 kg/m² as calculated from the following:    Height as of this encounter: 5' 1\" (1.549 m). Weight as of this encounter: 157 lb (71.2 kg). General:  Krishna Donnelly is a 66y.o. year old female who appears her stated age. HEENT: Normocephalic atraumatic. Neck supple. Chest: regular rate; pulses equal  Abdomen: Soft nontender nondistended.   Ext: DP and PT pulses 2+, good cap refill  Neuro    Mentation  Appropriate affect  Registration intact  Orientation intact  3 item recall intact  Judgement intact to situation    Cranial Nerves:   Pupils equal and reactive to light  Extraocular motion intact  Face and shrug symmetric  Tongue midline  No dysarthria  v1-3 sensation symmetric, masseter tone symmetric  Hearing symmetric    Sensation: Intact    Motor  L deltoid 5/5; R deltoid 5/5  L biceps 5/5; R biceps 5/5  L triceps 5/5; R triceps 5/5  L wrist extension 5/5; R wrist extension 5/5  L intrinsics 5/5; R intrinsics 5/5     L iliopsoas 5/5 , R iliopsoas 5/5  L quadriceps 5/5; R quadriceps 5/5  L Dorsiflexion 5/5; R dorsiflexion 5/5  L Plantarflexion 5/5; R plantarflexion 5/5  L EHL 5/5; R EHL 5/5    Reflexes  L Brachioradialis 2+/4; R brachioradialis 2+/4  L Biceps 2+/4; R Biceps 2+/4  L Triceps 2+/4; R Triceps 2+/4  L Patellar 2+/4: R Patellar 2+/4  L Achilles 2+/4; R Achilles 2+/4    hoffmans L: neg  hoffmans R: neg  Clonus L: neg  Clonus R: neg  Babinski L: neg  Babinski R: neg    Studies Review:     CT head 12/30/2020: Impression   No acute intracranial abnormality; small intracranial bleed seen previously   has resolved.       Redemonstration of right parietal scalp hematoma.         Assessment and Plan:      1. SAH (subarachnoid hemorrhage) ([address]    2. Hematoma of scalp, subsequent encounter          Plan: Patient doing very well clinically. Repeat CT scan stable. Has fully resumed both aspirin and Plavix, doing well with this. Denies any headaches, nausea vomiting or weakness. Continue to follow with orthopedist for right hip pain. Can follow-up in this office on an as-needed basis. Followup: Return if symptoms worsen or fail to improve. Prescriptions Ordered:  No orders of the defined types were placed in this encounter. Orders Placed:  No orders of the defined types were placed in this encounter. Electronically signed by ALMA Poe CNP on 1/13/2021 at 8:42 AM    Please note that this chart was generated using voice recognition Dragon dictation software. Although every effort was made to ensure the accuracy of this automated transcription, some errors in transcription may have occurred.

## 2021-02-04 ENCOUNTER — HOSPITAL ENCOUNTER (OUTPATIENT)
Age: 76
Setting detail: SPECIMEN
Discharge: HOME | End: 2021-02-04
Payer: MEDICARE

## 2021-02-04 ENCOUNTER — OFFICE VISIT (OUTPATIENT)
Dept: INTERNAL MEDICINE CLINIC | Age: 76
End: 2021-02-04
Payer: MEDICARE

## 2021-02-04 VITALS
WEIGHT: 157.6 LBS | DIASTOLIC BLOOD PRESSURE: 72 MMHG | SYSTOLIC BLOOD PRESSURE: 134 MMHG | HEIGHT: 61 IN | TEMPERATURE: 97 F | BODY MASS INDEX: 29.76 KG/M2

## 2021-02-04 DIAGNOSIS — E78.2 MIXED HYPERLIPIDEMIA: ICD-10-CM

## 2021-02-04 DIAGNOSIS — I25.10 CORONARY ARTERY DISEASE INVOLVING NATIVE CORONARY ARTERY OF NATIVE HEART WITHOUT ANGINA PECTORIS: ICD-10-CM

## 2021-02-04 DIAGNOSIS — Z11.59 NEED FOR HEPATITIS C SCREENING TEST: Primary | ICD-10-CM

## 2021-02-04 DIAGNOSIS — I10 ESSENTIAL HYPERTENSION: ICD-10-CM

## 2021-02-04 DIAGNOSIS — Z11.59 NEED FOR HEPATITIS C SCREENING TEST: ICD-10-CM

## 2021-02-04 DIAGNOSIS — Z00.00 ROUTINE GENERAL MEDICAL EXAMINATION AT A HEALTH CARE FACILITY: ICD-10-CM

## 2021-02-04 LAB — HEPATITIS C ANTIBODY: NONREACTIVE

## 2021-02-04 PROCEDURE — G0438 PPPS, INITIAL VISIT: HCPCS | Performed by: INTERNAL MEDICINE

## 2021-02-04 ASSESSMENT — PATIENT HEALTH QUESTIONNAIRE - PHQ9
SUM OF ALL RESPONSES TO PHQ QUESTIONS 1-9: 0
2. FEELING DOWN, DEPRESSED OR HOPELESS: 0
1. LITTLE INTEREST OR PLEASURE IN DOING THINGS: 0
SUM OF ALL RESPONSES TO PHQ9 QUESTIONS 1 & 2: 0
SUM OF ALL RESPONSES TO PHQ QUESTIONS 1-9: 0
SUM OF ALL RESPONSES TO PHQ QUESTIONS 1-9: 0

## 2021-02-04 ASSESSMENT — LIFESTYLE VARIABLES: HOW OFTEN DO YOU HAVE A DRINK CONTAINING ALCOHOL: 0

## 2021-02-04 NOTE — PROGRESS NOTES
Subjective:      Patient ID: Jeanette Bradn is a 66 y.o. female. HPI    Review of Systems    Objective:   Physical Exam    Assessment / Plan:      Visit Information    Have you changed or started any medications since your last visit including any over-the-counter medicines, vitamins, or herbal medicines? no   Are you having any side effects from any of your medications? -  no  Have you stopped taking any of your medications? Is so, why? -  no    Have you seen any other physician or provider since your last visit? Yes - Records Requested  Have you had any other diagnostic tests since your last visit? Yes - Records Requested  Have you been seen in the emergency room and/or had an admission to a hospital since we last saw you? Yes - Records Requested  Have you had your routine dental cleaning in the past 6 months? no    Have you activated your [employer] account? If not, what are your barriers?  Yes     Patient Care Team:  Moses Baker MD as PCP - General (Internal Medicine)  Moses Baker MD as PCP - [facility]  Carl Davis MD (Gastroenterology)  Joey Fountain MD as Referring Physician (Cardiology)  Radha Clemente MD as Surgeon (Cardiothoracic Surgery)    Medical History Review  Past Medical, Family, and Social History reviewed and does not contribute to the patient presenting condition    Health Maintenance   Topic Date Due    Hepatitis C screen  1945    COVID-19 Vaccine (1 of 2) 02/17/1959    DTaP/Tdap/Td vaccine (1 - Tdap) 02/17/1962    Shingles Vaccine (1 of 2) 02/17/1993    Pneumococcal 65+ years Vaccine (1 of 1 - PPSV23) 02/17/2008    Annual Wellness Visit (AWV)  06/03/2019    Flu vaccine (1) 09/01/2020    Lipid screen  05/19/2021    Potassium monitoring  12/24/2021    Creatinine monitoring  12/24/2021    DEXA (modify frequency per FRAX score)  Completed    Hepatitis A vaccine  Aged Out    Hepatitis B vaccine  Aged Out    Hib vaccine  Aged C/ [place] 19 Meningococcal (ACWY) vaccine  Aged Out

## 2021-02-04 NOTE — PROGRESS NOTES
Medicare Annual Wellness Visit  Name: Ines Johnson Date: 2021   MRN: [id number] Sex: Female   Age: 66 y.o. Ethnicity: Non-/Non    : 1945 Race: Dustin Phillips is here for Medicare AWV    Screenings for behavioral, psychosocial and functional/safety risks, and cognitive dysfunction are all negative except as indicated below. These results, as well as other patient data from the [address] form, are documented in Flowsheets linked to this Encounter. No Known Allergies    Prior to Visit Medications    Medication Sig Taking? Authorizing Provider   alendronate (FOSAMAX) 70 MG tablet TAKE 1 TABLET BY MOUTH ONCE WEEKLY ON AN EMPTY STOMACH BEFORE BREAKFAST. REMAIN UPRIGHT FOR 30 MINUTES & TAKE WITH 8 OUNCES OF WATER Yes Franco Ware MD   fenofibrate (TRICOR) 48 MG tablet TAKE ONE TABLET BY MOUTH DAILY Yes Franco Ware MD   hydrochlorothiazide (HYDRODIURIL) 12.5 MG tablet Take 12.5 mg by mouth daily Yes Historical Provider, MD   carvedilol (COREG) 12.5 MG tablet Take 1 tablet by mouth 2 times daily Yes Franco Ware MD   atorvastatin (LIPITOR) 20 MG tablet Take 1 tablet by mouth daily Yes Jesus Vasques MD   nitroGLYCERIN (NITROSTAT) 0.4 MG SL tablet Place 1 tablet under the tongue every 5 minutes as needed for Chest pain up to max of 3 total doses. If no relief after 1 dose, call 911. Yes Jesus Vasques MD   Vitamin D (CHOLECALCIFEROL) 1000 UNITS CAPS capsule Take 1,000 Units by mouth daily. Yes Historical Provider, MD   Multiple Vitamins-Minerals (THERAPEUTIC MULTIVITAMIN-MINERALS) tablet Take 1 tablet by mouth daily. Yes Historical Provider, MD   Calcium Carbonate-Vitamin D (CALCIUM + D PO) Take 500 mg by mouth daily. Yes Historical Provider, MD   ascorbic acid (VITAMIN C) 500 MG tablet Take 500 mg by mouth daily. Yes Historical Provider, MD   acetaminophen (TYLENOL) 325 MG tablet Take 650 mg by mouth every 6 hours as needed.  Yes Historical Provider, MD Readings from Last 3 Encounters:   02/04/21 157 lb 9.6 oz (71.5 kg)   01/12/21 157 lb (71.2 kg)   12/23/20 157 lb (71.2 kg)     Vitals:    02/04/21 1010   BP: 134/72   Temp: 97 °F (36.1 °C)   Weight: 157 lb 9.6 oz (71.5 kg)   Height: 5' 1\" (1.549 m)     Body mass index is 29.78 kg/m². Based upon direct observation of the patient, evaluation of cognition reveals recent and remote memory intact. HPI- She has hypertension, hyperlipidemia, coronary artery disease status post stent, Osteopotrosis on Fosamax .  Patient is compliant with diet and medication, she had a Mechanical Fall, she was at [facility] , had CT head in 12/2020 , suggestive of SAH, which was negative   She had Xray of her Right Hip, Suggestive of Right Hip Arthritis   Does not wants to take Pneumonia Shot       General Appearance: alert and oriented to person, place and time, well developed and well- nourished, in no acute distress  Skin: warm and dry, no rash or erythema  Head: normocephalic and atraumatic  Eyes: pupils equal, round, and reactive to light, extraocular eye movements intact, conjunctivae normal    Neck: supple and non-tender without mass, no thyromegaly or thyroid nodules, no cervical lymphadenopathy  Pulmonary/Chest: clear to auscultation bilaterally- no wheezes, rales or rhonchi, normal air movement, no respiratory distress  Cardiovascular: normal rate, regular rhythm, normal S1 and S2, no murmurs, rubs, clicks, or gallops, distal pulses intact, no carotid bruits  Abdomen: soft, non-tender, non-distended, normal bowel sounds, no masses or organomegaly  Extremities: no cyanosis, clubbing or edema  Musculoskeletal: JANUSZ Test Present on Right side , Point Tenderness on Right Lateral Hip area   Neurologic: reflexes normal and symmetric, no cranial nerve deficit, gait, coordination and speech normal    Patient's complete Health Risk Assessment and screening values have been reviewed and are found in Flowsheets.  The following problems were reviewed today and where indicated follow up appointments were made and/or referrals ordered. Positive Risk Factor Screenings with Interventions:     Fall Risk:  2 or more falls in past year?: (!) yes  Fall with injury in past year?: (!) yes  Fall Risk Interventions:    · Home safety tips provided        General Health and ACP:  General  In general, how would you say your health is?: Good  In the past 7 days, have you experienced any of the following?  New or Increased Pain, New or Increased Fatigue, Loneliness, Social Isolation, Stress or Anger?: (!) New or Increased Pain, New or Increased Fatigue  Do you get the social and emotional support that you need?: Yes  Do you have a Living Will?: Yes  Advance Directives     Power of  Living Will ACP-Advance Directive ACP-Power of     Not on File Not on File Not on File Not on File      General Health Risk Interventions:  · have Living will     Health Habits/Nutrition:  Health Habits/Nutrition  Do you exercise for at least 20 minutes 2-3 times per week?: Yes  Have you lost any weight without trying in the past 3 months?: No  Do you eat only one meal per day?: No  Have you seen the dentist within the past year?: (!) No  Body mass index: (!) 29.78  Health Habits/Nutrition Interventions:  · Advise to see a Dentist     Hearing/Vision:  No exam data present  Hearing/Vision  Do you or your family notice any trouble with your hearing that hasn't been managed with hearing aids?: No  Do you have difficulty driving, watching TV, or doing any of your daily activities because of your eyesight?: No  Have you had an eye exam within the past year?: (!) No  Hearing/Vision Interventions:  · Advise to see opthalmologist     Safety:  Safety  Do you have working smoke detectors?: Yes  Have all throw rugs been removed or fastened?: Yes  Do you have non-slip mats or surfaces in all bathtubs/showers?: (!) No  Do all of your stairways have a railing or banister?: Yes  Are your doorways, halls and stairs free of clutter?: Yes  Do you always fasten your seatbelt when you are in a car?: Yes  Safety Interventions:  · Home safety tips provided     Personalized Preventive Plan   Current Health Maintenance Status  There is no immunization history for the selected administration types on file for this patient. Health Maintenance   Topic Date Due    Hepatitis C screen  1945    COVID-19 Vaccine (1 of 2) 02/17/1959    DTaP/Tdap/Td vaccine (1 - Tdap) 02/17/1962    Shingles Vaccine (1 of 2) 02/17/1993    Pneumococcal 65+ years Vaccine (1 of 1 - PPSV23) 02/17/2008    Annual Wellness Visit (AWV)  06/03/2019    Flu vaccine (1) 09/01/2020    Lipid screen  05/19/2021    Potassium monitoring  12/24/2021    Creatinine monitoring  12/24/2021    DEXA (modify frequency per FRAX score)  Completed    Hepatitis A vaccine  Aged Out    Hepatitis B vaccine  Aged Out    Hib vaccine  Aged Out    Meningococcal (ACWY) vaccine  Aged Out     Recommendations for [place] Due: see orders and patient instructions/AVS.  . Recommended screening schedule for the next 5-10 years is provided to the patient in written form: see Patient Instructions/AVS.    There are no diagnoses linked to this encounter. 1. Need for hepatitis C screening test  - Hepatitis C Antibody; Future    2. Essential hypertension  Controlled     3. Mixed hyperlipidemia  Last LDL was Normal     4. Coronary artery disease involving native coronary artery of native heart without angina pectoris  On Lipitor , ASA , Coreg     Has Right Hip Arthritis , Also has Trochanteric Bursitis on Right side   Refused to Take shot on Right Hip   · No follow-ups on file. · Reviewed prior labs and health maintenance. · Discussed use, benefit, and side effects of prescribed medications. Barriers to medication compliance addressed. All patient questions answered. Pt voiced understanding.          Allan Neftaly Licea, [address]  2/4/2021, 10:38 AM    Please note that this chart was generated using voice recognition Dragon dictation software. Although every effort was made to ensure the accuracy of this automated transcription, some errors in transcription may have occurred.

## 2021-02-04 NOTE — PATIENT INSTRUCTIONS
Patient Education        Hip Arthritis: Exercises  Introduction  Here are some examples of exercises for you to try. The exercises may be suggested for a condition or for rehabilitation. Start each exercise slowly. Ease off the exercises if you start to have pain. You will be told when to start these exercises and which ones will work best for you. How to do the exercises  Straight-leg raises to the outside   1. Lie on your side, with your affected hip on top. 2. Tighten the front thigh muscles of your top leg to keep your knee straight. 3. Keep your hip and your leg straight in line with the rest of your body, and keep your knee pointing forward. Do not drop your hip back. 4. Lift your top leg straight up toward the ceiling, about 12 inches off the floor. Hold for about 6 seconds, then slowly lower your leg. 5. Repeat 8 to 12 times. 6. Switch legs and repeat steps 1 through 5, even if only one hip is sore. Straight-leg raises to the inside   1. Lie on your side with your affected hip on the floor. 2. You can either prop up your other leg on a chair, or you can bend that knee and put that foot in front of your other knee. Do not drop your hip back. 3. Tighten the muscles on the front thigh of your bottom leg to straighten that knee. 4. Keep your kneecap pointing forward and your leg straight, and lift your bottom leg up toward the ceiling about 6 inches. Hold for about 6 seconds, then lower slowly. 5. Repeat 8 to 12 times. 6. Switch legs and repeat steps 1 through 5, even if only one hip is sore. Hip hike   1. Stand sideways on the bottom step of a staircase, and hold on to the banister or wall. 2. Keeping both knees straight, lift your good leg off the step and let it hang down. Then hike your good hip up to the same level as your affected hip or a little higher. 3. Repeat 8 to 12 times. 4. Switch legs and repeat steps 1 through 3, even if only one hip is sore. Bridging   1.  Lie on your back with both knees bent. Your knees should be bent about 90 degrees. 2. Then push your feet into the floor, squeeze your buttocks, and lift your hips off the floor until your shoulders, hips, and knees are all in a straight line. 3. Hold for about 6 seconds as you continue to breathe normally, and then slowly lower your hips back down to the floor and rest for up to 10 seconds. 4. Repeat 8 to 12 times. Hamstring stretch (lying down)   1. Lie flat on your back with your legs straight. If you feel discomfort in your back, place a small towel roll under your lower back. 2. Holding the back of your affected leg, lift your leg straight up and toward your body until you feel a stretch at the back of your thigh. 3. Hold the stretch for at least 30 seconds. 4. Repeat 2 to 4 times. 5. Switch legs and repeat steps 1 through 4, even if only one hip is sore. Standing quadriceps stretch   1. If you are not steady on your feet, hold on to a chair, counter, or wall. You can also lie on your stomach or your side to do this exercise. 2. Bend the knee of the leg you want to stretch, and reach behind you to grab the front of your foot or ankle with the hand on the same side. For example, if you are stretching your right leg, use your right hand. 3. Keeping your knees next to each other, pull your foot toward your buttock until you feel a gentle stretch across the front of your hip and down the front of your thigh. Your knee should be pointed directly to the ground, and not out to the side. 4. Hold the stretch for at least 15 to 30 seconds. 5. Repeat 2 to 4 times. 6. Switch legs and repeat steps 1 through 5, even if only one hip is sore. Hip rotator stretch   1. Lie on your back with both knees bent and your feet flat on the floor. 2. Put the ankle of your affected leg on your opposite thigh near your knee.   3. Use your hand to gently push your knee away from your body until you feel a gentle stretch around your hip.  4. Hold the stretch for 15 to 30 seconds. 5. Repeat 2 to 4 times. 6. Repeat steps 1 through 5, but this time use your hand to gently pull your knee toward your opposite shoulder. 7. Switch legs and repeat steps 1 through 6, even if only one hip is sore. Knee-to-chest   1. Lie on your back with your knees bent and your feet flat on the floor. 2. Bring your affected leg to your chest, keeping the other foot flat on the floor (or keeping the other leg straight, whichever feels better on your lower back). 3. Keep your lower back pressed to the floor. Hold for at least 15 to 30 seconds. 4. Relax, and lower the knee to the starting position. 5. Repeat 2 to 4 times. 6. Switch legs and repeat steps 1 through 5, even if only one hip is sore. 7. To get more stretch, put your other leg flat on the floor while pulling your knee to your chest.    Clamshell   1. Lie on your side, with your affected hip on top. Keep your feet and knees together and your knees bent. 2. Raise your top knee, but keep your feet together. Do not let your hips roll back. Your legs should open up like a clamshell. 3. Hold for 6 seconds. 4. Slowly lower your knee back down. Rest for 10 seconds. 5. Repeat 8 to 12 times. 6. Switch legs and repeat steps 1 through 5, even if only one hip is sore. Follow-up care is a key part of your treatment and safety. Be sure to make and go to all appointments, and call your doctor if you are having problems. It's also a good idea to know your test results and keep a list of the medicines you take. Where can you learn more? Go to https://Avegantpe"[email or web address].Triviala. org and sign in to your Udemy account. Enter Z297 in the Local Marketers box to learn more about \"Hip Arthritis: Exercises. \"     If you do not have an account, please click on the \"Sign Up Now\" link. Current as of: March 2, 2020               Content Version: 12.6  © 1406-9454 [employer].    Care instructions adapted under license by [facility] ([facility]). If you have questions about a medical condition or this instruction, always ask your healthcare professional. [place] [address] any warranty or liability for your use of this information. Patient Education        Hip Arthritis: Care Instructions  Your Care Instructions     Arthritis, also called osteoarthritis, is a breakdown of the tissue (cartilage) that cushions your joints. Many people have some arthritis as they age. When the cartilage in your hip joints wears down, your hip bone rubs against the hip socket. This causes pain and stiffness. Work with your doctor to find the right mix of treatments for your arthritis. There are things you can do at home to protect your hip joints, ease your pain, and help you stay active. But if your arthritis becomes so bad that you cannot walk, you may need surgery to replace the hip joint. Follow-up care is a key part of your treatment and safety. Be sure to make and go to all appointments, and call your doctor if you are having problems. It's also a good idea to know your test results and keep a list of the medicines you take. How can you care for yourself at home? · Stay at a healthy weight. Being overweight puts extra strain on your hip joints. · Talk to your doctor or physical therapist about exercises that will help ease hip pain. These tips may help. ? Stretch to help prevent stiffness and to prevent injury before you exercise. You may enjoy gentle forms of yoga to help keep your joints and muscles flexible. ? Walk instead of jog. Other types of exercise that are less stressful on the joints include riding a bike, swimming, and doing water exercise. ? Lift weights. Strong muscles help reduce stress on your joints. Stronger thigh muscles, for example, take some of the stress off of the knees and hips. Learn the right way to lift weights so you do not make joint pain worse.   · Take pain medicines exactly as directed. ? If the doctor gave you a prescription medicine for pain, take it as prescribed. ? If you are not taking a prescription pain medicine, ask your doctor if you can take an over-the-counter medicine. · Use a cane, crutch, walker, or another device if you need help to get around. These can help rest your hips. You also can use other things to make life easier, such as a higher toilet seat. · Do not sit in low chairs, which can make it painful to get up. · Put heat or cold on your sore hips as needed. Use whichever helps you most. You also can go back and forth between hot and cold packs. ? Apply heat 2 or 3 times a day for 20 to 30 minutesusing a heating pad, hot shower, or hot packto relieve pain and stiffness. ? Put ice or a cold pack on your sore hips for 10 to 20 minutes at a time to numb the area. Put a thin cloth between the ice and your skin. · Think about talking to your doctor about using capsaicin, a cream you apply to the skin for pain relief. When should you call for help? Call your doctor now or seek immediate medical care if:    · You have sudden swelling, warmth, or pain in any joint.     · You have joint pain and a fever or rash.     · You have such bad pain that you cannot use the joint. Watch closely for changes in your health, and be sure to contact your doctor if:    · You have mild joint symptoms that continue even with more than 6 weeks of care at home.     · You do not get better as expected.     · You have stomach pain or other problems with your medicine. Where can you learn more? Go to https://Teakbrett.QuantuModeling. org and sign in to your GigsWiz account. Enter A073 in the Glowbl box to learn more about \"Hip Arthritis: Care Instructions. \"     If you do not have an account, please click on the \"Sign Up Now\" link. Current as of: December 9, 2019               Content Version: 12.6  © 2158-8004 [employer].    Care slippers or shoes with a nonskid sole. · Stay at a healthy weight. · Wear comfortable shoes. When should you call for help? Call 911 anytime you think you may need emergency care. For example, call if:    · You have sudden chest pain and shortness of breath, or you cough up blood.     · You are not able to stand or walk or bear weight.     · Your buttocks, legs, or feet feel numb or tingly.     · Your leg or foot is cool or pale or changes color.     · You have severe pain. Call your doctor now or seek immediate medical care if:    · You have signs of infection, such as:  ? Increased pain, swelling, warmth, or redness in the hip area. ? Red streaks leading from the hip area. ? Pus draining from the hip area. ? A fever.     · You have signs of a blood clot, such as:  ? Pain in your calf, back of the knee, thigh, or groin. ? Redness and swelling in your leg or groin.     · You are not able to bend, straighten, or move your leg normally.     · You have trouble urinating or having bowel movements. Watch closely for changes in your health, and be sure to contact your doctor if:    · You do not get better as expected. Where can you learn more? Go to https://[employer].Entrustet. org and sign in to your MediaHound account. Enter Y129 in the BIO-NEMS box to learn more about \"Hip Pain: Care Instructions. \"     If you do not have an account, please click on the \"Sign Up Now\" link. Current as of: June 26, 2019               Content Version: 12.6  © 6141-8774 [employer]. Care instructions adapted under license by [facility] [employer] [facility] ([facility]). If you have questions about a medical condition or this instruction, always ask your healthcare professional. [place] [address] any warranty or liability for your use of this information. Personalized Preventive Plan for Perla Shepherd - 2/4/2021  Medicare offers a range of preventive health benefits.  Some of the tests and screenings are paid in full while other may be subject to a deductible, co-insurance, and/or copay. Some of these benefits include a comprehensive review of your medical history including lifestyle, illnesses that may run in your family, and various assessments and screenings as appropriate. After reviewing your medical record and screening and assessments performed today your provider may have ordered immunizations, labs, imaging, and/or referrals for you. A list of these orders (if applicable) as well as your Preventive Care list are included within your After Visit Summary for your review. Other Preventive Recommendations:    · A preventive eye exam performed by an eye specialist is recommended every 1-2 years to screen for glaucoma; cataracts, macular degeneration, and other eye disorders. · A preventive dental visit is recommended every 6 months. · Try to get at least 150 minutes of exercise per week or 10,000 steps per day on a pedometer . · Order or download the FREE \"Exercise & Physical Activity: Your Everyday Guide\" from [employer]. Call 5-544.127.2265 or search [employer] online. · You need 1774-1712 mg of calcium and 5592-8988 IU of vitamin D per day. It is possible to meet your calcium requirement with diet alone, but a vitamin D supplement is usually necessary to meet this goal.  · When exposed to the sun, use a sunscreen that protects against both UVA and UVB radiation with an SPF of 30 or greater. Reapply every 2 to 3 hours or after sweating, drying off with a towel, or swimming. · Always wear a seat belt when traveling in a car. Always wear a helmet when riding a bicycle or motorcycle.

## 2021-05-12 RX ORDER — CLOPIDOGREL BISULFATE 75 MG/1
TABLET ORAL
COMMUNITY
Start: 2021-03-18

## 2021-05-13 ENCOUNTER — OFFICE VISIT (OUTPATIENT)
Dept: INTERNAL MEDICINE CLINIC | Age: 76
End: 2021-05-13
Payer: MEDICARE

## 2021-05-13 VITALS
TEMPERATURE: 97.2 F | DIASTOLIC BLOOD PRESSURE: 88 MMHG | HEIGHT: 61 IN | BODY MASS INDEX: 29.15 KG/M2 | WEIGHT: 154.4 LBS | SYSTOLIC BLOOD PRESSURE: 130 MMHG

## 2021-05-13 DIAGNOSIS — Z91.81 AT HIGH RISK FOR FALLS: ICD-10-CM

## 2021-05-13 DIAGNOSIS — I25.10 CORONARY ARTERY DISEASE INVOLVING NATIVE CORONARY ARTERY OF NATIVE HEART WITHOUT ANGINA PECTORIS: ICD-10-CM

## 2021-05-13 DIAGNOSIS — I21.4 NSTEMI (NON-ST ELEVATED MYOCARDIAL INFARCTION) (HCC): Primary | ICD-10-CM

## 2021-05-13 DIAGNOSIS — I10 ESSENTIAL HYPERTENSION: ICD-10-CM

## 2021-05-13 PROCEDURE — 99214 OFFICE O/P EST MOD 30 MIN: CPT | Performed by: INTERNAL MEDICINE

## 2021-05-13 ASSESSMENT — PATIENT HEALTH QUESTIONNAIRE - PHQ9
SUM OF ALL RESPONSES TO PHQ QUESTIONS 1-9: 0
1. LITTLE INTEREST OR PLEASURE IN DOING THINGS: 0
SUM OF ALL RESPONSES TO PHQ QUESTIONS 1-9: 0
2. FEELING DOWN, DEPRESSED OR HOPELESS: 0

## 2021-05-13 NOTE — PROGRESS NOTES
Subjective:      Patient ID: Prudencio Ortiz is a 64 y.o. female. HPI Patient is here for evaluation multiple medical problems.  She has hypertension, hyperlipidemia, coronary artery disease status post stent.  Patient is compliant with diet and medication,she does not have any complains . She checks her BP at Home regularly   Done with COVID shot   Does not Wants to get Pneumonia Shot       Review of Systems   Constitutional: Negative for activity change, appetite change, chills, diaphoresis, fatigue and fever. HENT: Negative for congestion, dental problem, drooling and ear discharge. Eyes: Negative for pain, discharge, redness and itching. Respiratory: Negative for apnea, cough, choking, chest tightness and shortness of breath. Cardiovascular: Negative for chest pain and leg swelling. Gastrointestinal: Negative for abdominal distention, abdominal pain, blood in stool, constipation and diarrhea. Endocrine: Negative for cold intolerance and heat intolerance. Genitourinary: Negative for difficulty urinating, dysuria, enuresis, flank pain and frequency. Musculoskeletal: Negative for arthralgias, back pain, gait problem and joint swelling. Skin: Negative for color change, pallor and rash. Neurological: Negative for dizziness, facial asymmetry, light-headedness, numbness and headaches. Psychiatric/Behavioral: Negative for agitation, behavioral problems, confusion, decreased concentration and dysphoric mood. Objective:   Physical Exam  Constitutional:       Appearance: She is well-developed. She is not diaphoretic. HENT:      Head: Normocephalic and atraumatic. Mouth/Throat:      Pharynx: No oropharyngeal exudate. Eyes:      General: No scleral icterus. Right eye: No discharge. Left eye: No discharge. Conjunctiva/sclera: Conjunctivae normal.      Pupils: Pupils are equal, round, and reactive to light. Neck:      Thyroid: No thyromegaly. Vascular: No JVD. Trachea: No tracheal deviation. Cardiovascular:      Rate and Rhythm: Normal rate. Heart sounds: Normal heart sounds. No murmur heard. No gallop. Pulmonary:      Effort: Pulmonary effort is normal. No respiratory distress. Breath sounds: Normal breath sounds. No stridor. No wheezing or rales. Chest:      Chest wall: No tenderness. Abdominal:      General: Bowel sounds are normal. There is no distension. Palpations: Abdomen is soft. Tenderness: There is no abdominal tenderness. There is no guarding or rebound. Musculoskeletal:         General: Normal range of motion. Cervical back: Normal range of motion and neck supple. Neurological:      Mental Status: She is alert and oriented to person, place, and time. Assessment / Plan:   1. NSTEMI (non-ST elevated myocardial infarction) ([address]  Resolved     2. At high risk for fall  Safety Tips Provided     3. Coronary artery disease involving native coronary artery of native heart without angina pectoris  Stable , on Antiplatelets , Lipitor and Coreg    4. Essential hypertension  Controlled       · Return in about 4 months (around 9/13/2021). · Reviewed prior labs and health maintenance. · Discussed use, benefit, and side effects of prescribed medications. Barriers to medication compliance addressed. All patient questions answered. Pt voiced understanding. Frandy Mendiola MD  [facility]  5/20/2021, 5:24 AM    Please note that this chart was generated using voice recognition Dragon dictation software. Although every effort was made to ensure the accuracy of this automated transcription, some errors in transcription may have occurred. Visit Information    Have you changed or started any medications since your last visit including any over-the-counter medicines, vitamins, or herbal medicines?  no   Are you having any side effects from any of your medications? -  no  Have you stopped taking any of your medications? Is so, why? -  no    Have you seen any other physician or provider since your last visit? No  Have you had any other diagnostic tests since your last visit? No  Have you been seen in the emergency room and/or had an admission to a hospital since we last saw you? No  Have you had your routine dental cleaning in the past 6 months? no    Have you activated your [employer] account? If not, what are your barriers? Yes     Patient Care Team:  Tariq Lind MD as PCP - General (Internal Medicine)  Tariq Lind MD as PCP - [facility]  Gabino Galvan MD (Gastroenterology)  Max Esposito MD as Referring Physician (Cardiology)  Alina Pepe MD as Surgeon (Cardiothoracic Surgery)    Medical History Review  Past Medical, Family, and Social History reviewed and does not contribute to the patient presenting condition    Health Maintenance   Topic Date Due    COVID-19 Vaccine (1) Never done    DTaP/Tdap/Td vaccine (1 - Tdap) Never done    Shingles Vaccine (1 of 2) Never done    Pneumococcal 65+ years Vaccine (1 of 1 - PPSV23) Never done    Lipid screen  05/19/2021    Flu vaccine (Season Ended) 09/01/2021    Potassium monitoring  12/24/2021    Creatinine monitoring  12/24/2021    Annual Wellness Visit (AWV)  02/05/2022    DEXA (modify frequency per FRAX score)  Completed    Hepatitis C screen  Completed    Hepatitis A vaccine  Aged Out    Hepatitis B vaccine  Aged Out    Hib vaccine  Aged Out    Meningococcal (ACWY) vaccine  Aged Out       On the basis of positive falls risk screening, assessment and plan is as follows: home safety tips provided.

## 2021-05-20 ASSESSMENT — ENCOUNTER SYMPTOMS
BLOOD IN STOOL: 0
CHOKING: 0
EYE ITCHING: 0
COUGH: 0
EYE DISCHARGE: 0
EYE PAIN: 0
CHEST TIGHTNESS: 0
ABDOMINAL DISTENTION: 0
APNEA: 0
SHORTNESS OF BREATH: 0
ABDOMINAL PAIN: 0
COLOR CHANGE: 0
EYE REDNESS: 0
CONSTIPATION: 0
DIARRHEA: 0
BACK PAIN: 0

## 2021-07-02 DIAGNOSIS — I10 ESSENTIAL HYPERTENSION: ICD-10-CM

## 2021-07-02 RX ORDER — CARVEDILOL 12.5 MG/1
12.5 TABLET ORAL 2 TIMES DAILY
Qty: 180 TABLET | Refills: 3 | Status: SHIPPED | OUTPATIENT
Start: 2021-07-02

## 2021-07-02 NOTE — TELEPHONE ENCOUNTER
Medication: Carvedilol  Last visit: 5/13/21  Next visit: 9/15/2021  Last refill: 4/13/20  Pharmacy: [facility]

## 2021-07-06 ENCOUNTER — HOSPITAL ENCOUNTER (OUTPATIENT)
Age: 76
Setting detail: SPECIMEN
Discharge: HOME | End: 2021-07-06
Payer: MEDICARE

## 2021-07-06 LAB
ALT SERPL-CCNC: 16 U/L (ref 5–33)
AST SERPL-CCNC: 22 U/L
CHOLESTEROL/HDL RATIO: 4
CHOLESTEROL: 173 MG/DL
HDLC SERPL-MCNC: 43 MG/DL
LDL CHOLESTEROL: 86 MG/DL (ref 0–130)
TRIGL SERPL-MCNC: 219 MG/DL
VLDLC SERPL CALC-MCNC: ABNORMAL MG/DL (ref 1–30)

## 2021-08-02 DIAGNOSIS — M81.0 OSTEOPOROSIS, UNSPECIFIED OSTEOPOROSIS TYPE, UNSPECIFIED PATHOLOGICAL FRACTURE PRESENCE: ICD-10-CM

## 2021-08-02 RX ORDER — ALENDRONATE SODIUM 70 MG/1
TABLET ORAL
Qty: 12 TABLET | Refills: 1 | Status: SHIPPED | OUTPATIENT
Start: 2021-08-02 | End: 2022-02-07 | Stop reason: SDUPTHER

## 2021-09-15 ENCOUNTER — OFFICE VISIT (OUTPATIENT)
Dept: INTERNAL MEDICINE CLINIC | Age: 76
End: 2021-09-15
Payer: MEDICARE

## 2021-09-15 VITALS
SYSTOLIC BLOOD PRESSURE: 158 MMHG | WEIGHT: 152 LBS | BODY MASS INDEX: 28.7 KG/M2 | DIASTOLIC BLOOD PRESSURE: 90 MMHG | HEIGHT: 61 IN

## 2021-09-15 DIAGNOSIS — M81.0 OSTEOPOROSIS, UNSPECIFIED OSTEOPOROSIS TYPE, UNSPECIFIED PATHOLOGICAL FRACTURE PRESENCE: ICD-10-CM

## 2021-09-15 DIAGNOSIS — I25.10 CORONARY ARTERY DISEASE INVOLVING NATIVE CORONARY ARTERY OF NATIVE HEART WITHOUT ANGINA PECTORIS: ICD-10-CM

## 2021-09-15 DIAGNOSIS — I60.9 SAH (SUBARACHNOID HEMORRHAGE) (HCC): ICD-10-CM

## 2021-09-15 DIAGNOSIS — I21.4 NSTEMI (NON-ST ELEVATED MYOCARDIAL INFARCTION) (HCC): Primary | ICD-10-CM

## 2021-09-15 DIAGNOSIS — I10 ESSENTIAL HYPERTENSION: ICD-10-CM

## 2021-09-15 PROCEDURE — 99214 OFFICE O/P EST MOD 30 MIN: CPT | Performed by: INTERNAL MEDICINE

## 2021-09-15 SDOH — ECONOMIC STABILITY: FOOD INSECURITY: WITHIN THE PAST 12 MONTHS, YOU WORRIED THAT YOUR FOOD WOULD RUN OUT BEFORE YOU GOT MONEY TO BUY MORE.: NEVER TRUE

## 2021-09-15 SDOH — ECONOMIC STABILITY: FOOD INSECURITY: WITHIN THE PAST 12 MONTHS, THE FOOD YOU BOUGHT JUST DIDN'T LAST AND YOU DIDN'T HAVE MONEY TO GET MORE.: NEVER TRUE

## 2021-09-15 ASSESSMENT — ENCOUNTER SYMPTOMS
COUGH: 0
DIARRHEA: 0
CONSTIPATION: 0
BLOOD IN STOOL: 0
EYE PAIN: 0
EYE DISCHARGE: 0
CHEST TIGHTNESS: 0
APNEA: 0
ABDOMINAL DISTENTION: 0
BACK PAIN: 0
CHOKING: 0
EYE ITCHING: 0
EYE REDNESS: 0
ABDOMINAL PAIN: 0
SHORTNESS OF BREATH: 0
COLOR CHANGE: 0

## 2021-09-15 ASSESSMENT — SOCIAL DETERMINANTS OF HEALTH (SDOH): HOW HARD IS IT FOR YOU TO PAY FOR THE VERY BASICS LIKE FOOD, HOUSING, MEDICAL CARE, AND HEATING?: NOT HARD AT ALL

## 2021-09-15 NOTE — PROGRESS NOTES
Subjective:      Patient ID: Selma Hinkle is a 64 y.o. female. HPI Patient is here for evaluation multiple medical problems.  She has hypertension, hyperlipidemia, coronary artery disease status post stent.  Patient is compliant with diet and medication,  She checks her BP at Home regularly , her BP is OK most of time   nO chest Pain, SOB with Excretion, Follows with Dr Kyle Rizo   Review of Systems   Constitutional: Negative for activity change, appetite change, chills, diaphoresis, fatigue and fever. HENT: Negative for congestion, dental problem, drooling and ear discharge. Eyes: Negative for pain, discharge, redness and itching. Respiratory: Negative for apnea, cough, choking, chest tightness and shortness of breath. Cardiovascular: Negative for chest pain and leg swelling. Gastrointestinal: Negative for abdominal distention, abdominal pain, blood in stool, constipation and diarrhea. Endocrine: Negative for cold intolerance and heat intolerance. Genitourinary: Negative for difficulty urinating, dysuria, enuresis, flank pain and frequency. Musculoskeletal: Negative for arthralgias, back pain, gait problem and joint swelling. Skin: Negative for color change, pallor and rash. Neurological: Negative for dizziness, facial asymmetry, light-headedness, numbness and headaches. Psychiatric/Behavioral: Negative for agitation, behavioral problems, confusion, decreased concentration and dysphoric mood. Objective:   Physical Exam  Constitutional:       Appearance: She is well-developed. She is not diaphoretic. HENT:      Head: Normocephalic and atraumatic. Mouth/Throat:      Pharynx: No oropharyngeal exudate. Eyes:      General: No scleral icterus. Right eye: No discharge. Left eye: No discharge. Conjunctiva/sclera: Conjunctivae normal.      Pupils: Pupils are equal, round, and reactive to light. Neck:      Thyroid: No thyromegaly. Vascular: No JVD. Trachea: No tracheal deviation. Cardiovascular:      Rate and Rhythm: Normal rate. Heart sounds: Normal heart sounds. No murmur heard. No gallop. Pulmonary:      Effort: Pulmonary effort is normal. No respiratory distress. Breath sounds: Normal breath sounds. No stridor. No wheezing or rales. Chest:      Chest wall: No tenderness. Abdominal:      General: Bowel sounds are normal. There is no distension. Palpations: Abdomen is soft. Tenderness: There is no abdominal tenderness. There is no guarding or rebound. Musculoskeletal:         General: Normal range of motion. Cervical back: Normal range of motion and neck supple. Neurological:      Mental Status: She is alert and oriented to person, place, and time. Assessment / Plan:   1. NSTEMI (non-ST elevated myocardial infarction) ([facility] [address]  Resolved     2. SAH (subarachnoid hemorrhage) ([facility] [address]      3. Essential hypertension  Repeat blood pressure tested is okay    4. Osteoporosis, unspecified osteoporosis type, unspecified pathological fracture presence  On Fosamax    5. Coronary artery disease involving native coronary artery of native heart without angina pectoris    - Comprehensive Metabolic Panel; Future      · Return in about 4 months (around 1/15/2022). · Reviewed prior labs and health maintenance. · Discussed use, benefit, and side effects of prescribed medications. Barriers to medication compliance addressed. All patient questions answered. Pt voiced understanding. MD J[facility]N [facility]  9/15/2021, 4:52 PM    Please note that this chart was generated using voice recognition Dragon dictation software. Although every effort was made to ensure the accuracy of this automated transcription, some errors in transcription may have occurred.         Visit Information    Have you changed or started any medications since your last visit including any over-the-counter medicines, vitamins, or herbal medicines? no   Are you having any side effects from any of your medications? -  no  Have you stopped taking any of your medications? Is so, why? -  no    Have you seen any other physician or provider since your last visit? No  Have you had any other diagnostic tests since your last visit? Yes - Records Requested  Have you been seen in the emergency room and/or had an admission to a hospital since we last saw you? No  Have you had your routine dental cleaning in the past 6 months? no    Have you activated your [employer] account? If not, what are your barriers?  Yes     Patient Care Team:  Kris Griffith MD as PCP - General (Internal Medicine)  Kris Griffith MD as PCP - [facility]  Jaden Zuniga MD (Gastroenterology)  Mahesh Naranjo MD as Referring Physician (Cardiology)  Ligia Holcomb MD as Surgeon (Cardiothoracic Surgery)    Medical History Review  Past Medical, Family, and Social History reviewed and does not contribute to the patient presenting condition    Health Maintenance   Topic Date Due    COVID-19 Vaccine (1) Never done    DTaP/Tdap/Td vaccine (1 - Tdap) Never done    Shingles Vaccine (1 of 2) Never done    Pneumococcal 65+ years Vaccine (1 of 1 - PPSV23) Never done    Flu vaccine (1) Never done    Potassium monitoring  12/24/2021    Creatinine monitoring  12/24/2021    Annual Wellness Visit (AWV)  02/05/2022    Lipid screen  07/06/2022    DEXA (modify frequency per FRAX score)  Completed    Hepatitis C screen  Completed    Hepatitis A vaccine  Aged Out    Hepatitis B vaccine  Aged Out    Hib vaccine  Aged Out    Meningococcal (ACWY) vaccine  Aged Out

## 2021-09-20 ENCOUNTER — HOSPITAL ENCOUNTER (OUTPATIENT)
Age: 76
Setting detail: SPECIMEN
Discharge: HOME | End: 2021-09-20
Payer: MEDICARE

## 2021-09-20 DIAGNOSIS — I25.10 CORONARY ARTERY DISEASE INVOLVING NATIVE CORONARY ARTERY OF NATIVE HEART WITHOUT ANGINA PECTORIS: ICD-10-CM

## 2021-09-20 LAB
ALBUMIN SERPL-MCNC: 4.4 G/DL (ref 3.5–5.2)
ALBUMIN/GLOBULIN RATIO: 1.8 (ref 1–2.5)
ALP BLD-CCNC: 34 U/L (ref 35–104)
ALT SERPL-CCNC: 18 U/L (ref 5–33)
ANION GAP SERPL CALCULATED.3IONS-SCNC: 13 MMOL/L (ref 9–17)
AST SERPL-CCNC: 23 U/L
BILIRUB SERPL-MCNC: 0.23 MG/DL (ref 0.3–1.2)
BUN BLDV-MCNC: 17 MG/DL (ref 8–23)
BUN/CREAT BLD: ABNORMAL (ref 9–20)
CALCIUM SERPL-MCNC: 9.5 MG/DL (ref 8.6–10.4)
CHLORIDE BLD-SCNC: 102 MMOL/L (ref 98–107)
CO2: 23 MMOL/L (ref 20–31)
CREAT SERPL-MCNC: 0.86 MG/DL (ref 0.5–0.9)
GFR AFRICAN AMERICAN: >60 ML/MIN
GFR NON-AFRICAN AMERICAN: >60 ML/MIN
GFR SERPL CREATININE-BSD FRML MDRD: ABNORMAL ML/MIN/{1.73_M2}
GFR SERPL CREATININE-BSD FRML MDRD: ABNORMAL ML/MIN/{1.73_M2}
GLUCOSE BLD-MCNC: 94 MG/DL (ref 70–99)
POTASSIUM SERPL-SCNC: 3.9 MMOL/L (ref 3.7–5.3)
SODIUM BLD-SCNC: 138 MMOL/L (ref 135–144)
TOTAL PROTEIN: 6.8 G/DL (ref 6.4–8.3)

## 2022-02-07 DIAGNOSIS — M81.0 OSTEOPOROSIS, UNSPECIFIED OSTEOPOROSIS TYPE, UNSPECIFIED PATHOLOGICAL FRACTURE PRESENCE: ICD-10-CM

## 2022-02-07 RX ORDER — ALENDRONATE SODIUM 70 MG/1
70 TABLET ORAL
Qty: 12 TABLET | Refills: 1 | Status: SHIPPED | OUTPATIENT
Start: 2022-02-07 | End: 2022-08-03 | Stop reason: SDUPTHER

## 2022-03-31 RX ORDER — CLOPIDOGREL BISULFATE 75 MG/1
TABLET ORAL
Qty: 90 TABLET | OUTPATIENT
Start: 2022-03-31

## 2022-05-27 ENCOUNTER — OFFICE VISIT (OUTPATIENT)
Dept: INTERNAL MEDICINE CLINIC | Age: 77
End: 2022-05-27
Payer: MEDICARE

## 2022-05-27 VITALS
SYSTOLIC BLOOD PRESSURE: 122 MMHG | HEART RATE: 62 BPM | WEIGHT: 150 LBS | OXYGEN SATURATION: 99 % | HEIGHT: 61 IN | DIASTOLIC BLOOD PRESSURE: 60 MMHG | BODY MASS INDEX: 28.32 KG/M2

## 2022-05-27 DIAGNOSIS — I21.4 NSTEMI (NON-ST ELEVATED MYOCARDIAL INFARCTION) (HCC): Primary | ICD-10-CM

## 2022-05-27 DIAGNOSIS — I60.9 SAH (SUBARACHNOID HEMORRHAGE) (HCC): ICD-10-CM

## 2022-05-27 DIAGNOSIS — I25.10 CORONARY ARTERY DISEASE INVOLVING NATIVE CORONARY ARTERY OF NATIVE HEART WITHOUT ANGINA PECTORIS: ICD-10-CM

## 2022-05-27 DIAGNOSIS — I10 ESSENTIAL HYPERTENSION: ICD-10-CM

## 2022-05-27 PROCEDURE — 1123F ACP DISCUSS/DSCN MKR DOCD: CPT | Performed by: INTERNAL MEDICINE

## 2022-05-27 PROCEDURE — 99214 OFFICE O/P EST MOD 30 MIN: CPT | Performed by: INTERNAL MEDICINE

## 2022-05-27 ASSESSMENT — PATIENT HEALTH QUESTIONNAIRE - PHQ9
7. TROUBLE CONCENTRATING ON THINGS, SUCH AS READING THE NEWSPAPER OR WATCHING TELEVISION: 0
5. POOR APPETITE OR OVEREATING: 0
SUM OF ALL RESPONSES TO PHQ QUESTIONS 1-9: 0
6. FEELING BAD ABOUT YOURSELF - OR THAT YOU ARE A FAILURE OR HAVE LET YOURSELF OR YOUR FAMILY DOWN: 0
SUM OF ALL RESPONSES TO PHQ QUESTIONS 1-9: 0
8. MOVING OR SPEAKING SO SLOWLY THAT OTHER PEOPLE COULD HAVE NOTICED. OR THE OPPOSITE, BEING SO FIGETY OR RESTLESS THAT YOU HAVE BEEN MOVING AROUND A LOT MORE THAN USUAL: 0
10. IF YOU CHECKED OFF ANY PROBLEMS, HOW DIFFICULT HAVE THESE PROBLEMS MADE IT FOR YOU TO DO YOUR WORK, TAKE CARE OF THINGS AT HOME, OR GET ALONG WITH OTHER PEOPLE: 0
2. FEELING DOWN, DEPRESSED OR HOPELESS: 0
1. LITTLE INTEREST OR PLEASURE IN DOING THINGS: 0
SUM OF ALL RESPONSES TO PHQ9 QUESTIONS 1 & 2: 0
3. TROUBLE FALLING OR STAYING ASLEEP: 0
4. FEELING TIRED OR HAVING LITTLE ENERGY: 0
SUM OF ALL RESPONSES TO PHQ QUESTIONS 1-9: 0
SUM OF ALL RESPONSES TO PHQ QUESTIONS 1-9: 0
9. THOUGHTS THAT YOU WOULD BE BETTER OFF DEAD, OR OF HURTING YOURSELF: 0

## 2022-05-27 NOTE — PROGRESS NOTES
Subjective:      Patient ID: Gideon Shane is a 76 y.o. female. HPI Patient is here for evaluation multiple medical problems.  She has hypertension, hyperlipidemia, coronary artery disease status post stent s/p CABG in 2015 , osteopenia .  Patient is compliant with diet and medication,  She checks her BP at Home regularly , her BP is OK most of time   nO chest Pain, SOB with Excretion, Follows with Dr Hang Page     Review of Systems   Constitutional: Negative for activity change, appetite change, chills, diaphoresis, fatigue and fever. HENT: Negative for congestion, dental problem, drooling and ear discharge. Eyes: Negative for pain, discharge, redness and itching. Respiratory: Negative for apnea, cough, choking, chest tightness and shortness of breath. Cardiovascular: Negative for chest pain and leg swelling. Gastrointestinal: Negative for abdominal distention, abdominal pain, blood in stool, constipation and diarrhea. Endocrine: Negative for cold intolerance and heat intolerance. Genitourinary: Negative for difficulty urinating, dysuria, enuresis, flank pain and frequency. Musculoskeletal: Negative for arthralgias, back pain, gait problem and joint swelling. Skin: Negative for color change, pallor and rash. Neurological: Negative for dizziness, facial asymmetry, light-headedness, numbness and headaches. Psychiatric/Behavioral: Negative for agitation, behavioral problems, confusion, decreased concentration and dysphoric mood. Objective:   Physical Exam  Constitutional:       Appearance: She is well-developed. She is not diaphoretic. HENT:      Head: Normocephalic and atraumatic. Mouth/Throat:      Pharynx: No oropharyngeal exudate. Eyes:      General: No scleral icterus. Right eye: No discharge. Left eye: No discharge. Conjunctiva/sclera: Conjunctivae normal.      Pupils: Pupils are equal, round, and reactive to light. Neck:      Thyroid: No thyromegaly. Vascular: No JVD. Trachea: No tracheal deviation. Cardiovascular:      Rate and Rhythm: Normal rate. Heart sounds: Normal heart sounds. No murmur heard. No gallop. Pulmonary:      Effort: Pulmonary effort is normal. No respiratory distress. Breath sounds: Normal breath sounds. No stridor. No wheezing or rales. Chest:      Chest wall: No tenderness. Abdominal:      General: Bowel sounds are normal. There is no distension. Palpations: Abdomen is soft. Tenderness: There is no abdominal tenderness. There is no guarding or rebound. Musculoskeletal:         General: Normal range of motion. Cervical back: Normal range of motion and neck supple. Neurological:      Mental Status: She is alert and oriented to person, place, and time. Assessment / Plan:   1. NSTEMI (non-ST elevated myocardial infarction) ([address]  Resolved     2. SAH (subarachnoid hemorrhage) ([facility])  Stable     3. Essential hypertension  controlled    4. Coronary artery disease involving native coronary artery of native heart without angina pectoris  Patient is on Plavix, Coreg, fenofibrate, Lipitor      · Return in about 4 months (around 9/27/2022). · Reviewed prior labs and health maintenance. · Discussed use, benefit, and side effects of prescribed medications. Barriers to medication compliance addressed. All patient questions answered. Pt voiced understanding. Neli Decker MD  [facility]  6/1/2022, 10:56 PM    Please note that this chart was generated using voice recognition Dragon dictation software. Although every effort was made to ensure the accuracy of this automated transcription, some errors in transcription may have occurred. Visit Information    Have you changed or started any medications since your last visit including any over-the-counter medicines, vitamins, or herbal medicines? no   Are you having any side effects from any of your medications?  - no  Have you stopped taking any of your medications? Is so, why? -  no    Have you seen any other physician or provider since your last visit? Yes - Records Obtained  Have you had any other diagnostic tests since your last visit? No  Have you been seen in the emergency room and/or had an admission to a hospital since we last saw you? No  Have you had your routine dental cleaning in the past 6 months? no    Have you activated your [employer] account? If not, what are your barriers?  Yes     Patient Care Team:  Harjinder Caba MD as PCP - General (Internal Medicine)  Harjinder Caba MD as PCP - [facility]  Herve Young MD (Gastroenterology)  Chandra Espana MD as Referring Physician (Cardiology)  Amber Khan MD as Surgeon (Cardiothoracic Surgery)    Medical History Review  Past Medical, Family, and Social History reviewed and does contribute to the patient presenting condition    Health Maintenance   Topic Date Due    DTaP/Tdap/Td vaccine (1 - Tdap) Never done    Shingles vaccine (1 of 2) Never done    Pneumococcal 65+ years Vaccine (1 - PCV) Never done   [employer] Visit (AWV)  02/05/2022    Depression Screen  05/13/2022    Lipids  07/06/2022    Flu vaccine (Season Ended) 09/01/2022    DEXA (modify frequency per FRAX score)  Completed    COVID-19 Vaccine  Completed    Hepatitis C screen  Completed    Hepatitis A vaccine  Aged Out    Hepatitis B vaccine  Aged Out    Hib vaccine  Aged Out    Meningococcal (ACWY) vaccine  Aged Out

## 2022-06-01 ASSESSMENT — ENCOUNTER SYMPTOMS
COUGH: 0
DIARRHEA: 0
CONSTIPATION: 0
EYE REDNESS: 0
ABDOMINAL PAIN: 0
APNEA: 0
EYE ITCHING: 0
BACK PAIN: 0
EYE PAIN: 0
SHORTNESS OF BREATH: 0
COLOR CHANGE: 0
CHEST TIGHTNESS: 0
EYE DISCHARGE: 0
BLOOD IN STOOL: 0
CHOKING: 0
ABDOMINAL DISTENTION: 0

## 2022-08-03 DIAGNOSIS — M81.0 OSTEOPOROSIS, UNSPECIFIED OSTEOPOROSIS TYPE, UNSPECIFIED PATHOLOGICAL FRACTURE PRESENCE: ICD-10-CM

## 2022-08-03 RX ORDER — ALENDRONATE SODIUM 70 MG/1
70 TABLET ORAL
Qty: 12 TABLET | Refills: 1 | Status: SHIPPED | OUTPATIENT
Start: 2022-08-03

## 2022-10-19 ENCOUNTER — OFFICE VISIT (OUTPATIENT)
Dept: INTERNAL MEDICINE CLINIC | Age: 77
End: 2022-10-19
Payer: MEDICARE

## 2022-10-19 VITALS
DIASTOLIC BLOOD PRESSURE: 82 MMHG | HEART RATE: 66 BPM | HEIGHT: 61 IN | SYSTOLIC BLOOD PRESSURE: 136 MMHG | WEIGHT: 146.8 LBS | OXYGEN SATURATION: 97 % | BODY MASS INDEX: 27.72 KG/M2

## 2022-10-19 DIAGNOSIS — T84.018D FAILURE OF TOTAL KNEE REPLACEMENT, SUBSEQUENT ENCOUNTER: ICD-10-CM

## 2022-10-19 DIAGNOSIS — I60.9 SAH (SUBARACHNOID HEMORRHAGE) (HCC): ICD-10-CM

## 2022-10-19 DIAGNOSIS — M85.80 OSTEOPENIA, UNSPECIFIED LOCATION: ICD-10-CM

## 2022-10-19 DIAGNOSIS — Z13.220 SCREENING FOR HYPERLIPIDEMIA: ICD-10-CM

## 2022-10-19 DIAGNOSIS — Z96.659 FAILURE OF TOTAL KNEE REPLACEMENT, SUBSEQUENT ENCOUNTER: ICD-10-CM

## 2022-10-19 DIAGNOSIS — I21.4 NSTEMI (NON-ST ELEVATED MYOCARDIAL INFARCTION) (HCC): Primary | ICD-10-CM

## 2022-10-19 DIAGNOSIS — I10 ESSENTIAL HYPERTENSION: ICD-10-CM

## 2022-10-19 PROCEDURE — 1123F ACP DISCUSS/DSCN MKR DOCD: CPT | Performed by: INTERNAL MEDICINE

## 2022-10-19 PROCEDURE — 99214 OFFICE O/P EST MOD 30 MIN: CPT | Performed by: INTERNAL MEDICINE

## 2022-10-19 SDOH — ECONOMIC STABILITY: FOOD INSECURITY: WITHIN THE PAST 12 MONTHS, THE FOOD YOU BOUGHT JUST DIDN'T LAST AND YOU DIDN'T HAVE MONEY TO GET MORE.: NEVER TRUE

## 2022-10-19 SDOH — ECONOMIC STABILITY: FOOD INSECURITY: WITHIN THE PAST 12 MONTHS, YOU WORRIED THAT YOUR FOOD WOULD RUN OUT BEFORE YOU GOT MONEY TO BUY MORE.: NEVER TRUE

## 2022-10-19 ASSESSMENT — ENCOUNTER SYMPTOMS
ABDOMINAL DISTENTION: 0
COLOR CHANGE: 0
BLOOD IN STOOL: 0
SHORTNESS OF BREATH: 0
COUGH: 0
EYE ITCHING: 0
DIARRHEA: 0
EYE REDNESS: 0
EYE DISCHARGE: 0
ABDOMINAL PAIN: 0
CONSTIPATION: 0
APNEA: 0
CHOKING: 0
CHEST TIGHTNESS: 0
BACK PAIN: 1
EYE PAIN: 0

## 2022-10-19 ASSESSMENT — SOCIAL DETERMINANTS OF HEALTH (SDOH): HOW HARD IS IT FOR YOU TO PAY FOR THE VERY BASICS LIKE FOOD, HOUSING, MEDICAL CARE, AND HEATING?: NOT HARD AT ALL

## 2022-10-19 NOTE — PROGRESS NOTES
Subjective:      Patient ID: Wing Valerio is a 76 y.o. female. HPI  Patient is here for evaluation multiple medical problems. She has hypertension, hyperlipidemia, coronary artery disease status post stent s/p CABG in 2015 , osteopenia . Patient is compliant with diet and medication,  She checks her BP at Home regularly , her BP is OK most of time   nO chest Pain, SOB with Excretion, Follows with Dr Jomar Faust ( has Appointment Next Month)   Has intermittent pain in Left Leg, Improved   She is complaint with her meds     Review of Systems   Constitutional:  Negative for activity change, appetite change, chills, diaphoresis, fatigue and fever. HENT:  Negative for congestion, dental problem, drooling and ear discharge. Eyes:  Negative for pain, discharge, redness and itching. Respiratory:  Negative for apnea, cough, choking, chest tightness and shortness of breath. Cardiovascular:  Negative for chest pain and leg swelling. Gastrointestinal:  Negative for abdominal distention, abdominal pain, blood in stool, constipation and diarrhea. Endocrine: Negative for cold intolerance and heat intolerance. Genitourinary:  Negative for difficulty urinating, dysuria, enuresis, flank pain and frequency. Musculoskeletal:  Positive for arthralgias and back pain. Negative for gait problem and joint swelling. Skin:  Negative for color change, pallor and rash. Neurological:  Negative for dizziness, facial asymmetry, light-headedness, numbness and headaches. Psychiatric/Behavioral:  Negative for agitation, behavioral problems, confusion, decreased concentration and dysphoric mood. Objective:   Physical Exam  Constitutional:       Appearance: She is well-developed. She is not diaphoretic. HENT:      Head: Normocephalic and atraumatic. Mouth/Throat:      Pharynx: No oropharyngeal exudate. Eyes:      General: No scleral icterus. Right eye: No discharge. Left eye: No discharge. Conjunctiva/sclera: Conjunctivae normal.      Pupils: Pupils are equal, round, and reactive to light. Neck:      Thyroid: No thyromegaly. Vascular: No JVD. Trachea: No tracheal deviation. Cardiovascular:      Rate and Rhythm: Normal rate. Heart sounds: Normal heart sounds. No murmur heard. No gallop. Pulmonary:      Effort: Pulmonary effort is normal. No respiratory distress. Breath sounds: Normal breath sounds. No stridor. No wheezing or rales. Chest:      Chest wall: No tenderness. Abdominal:      General: Bowel sounds are normal. There is no distension. Palpations: Abdomen is soft. Tenderness: There is no abdominal tenderness. There is no guarding or rebound. Musculoskeletal:         General: Normal range of motion. Cervical back: Normal range of motion and neck supple. Neurological:      Mental Status: She is alert and oriented to person, place, and time. Assessment / Plan:   1. NSTEMI (non-ST elevated myocardial infarction) ([facility] [address]  Stable   Has Appointment with Cardiologist   - Comprehensive Metabolic Panel; Future    2. SAH (subarachnoid hemorrhage) ([facility] [address]  After Fall , Improved     3. Failure of total knee replacement, subsequent encounter      4. Screening for hyperlipidemia  - Lipid Panel; Future    5. Essential hypertension  Controlled   6. Osteopenia, unspecified location  - DEXA BONE DENSITY AXIAL SKELETON; Future      Return in about 4 months (around 2/19/2023). Reviewed prior labs and health maintenance. Discussed use, benefit, and side effects of prescribed medications. Barriers to medication compliance addressed. All patient questions answered. Pt voiced understanding. Erling Mcardle, MD [facility]  10/19/2022, 1:29 PM    Please note that this chart was generated using voice recognition Dragon dictation software.   Although every effort was made to ensure the accuracy of this automated transcription, some errors in transcription may have occurred.

## 2022-10-19 NOTE — PROGRESS NOTES
Visit Information    Have you changed or started any medications since your last visit including any over-the-counter medicines, vitamins, or herbal medicines? no   Are you having any side effects from any of your medications? -  no  Have you stopped taking any of your medications? Is so, why? -  no    Have you seen any other physician or provider since your last visit? No  Have you had any other diagnostic tests since your last visit? No  Have you been seen in the emergency room and/or had an admission to a hospital since we last saw you? No  Have you had your routine dental cleaning in the past 6 months? no    Have you activated your [employer] account? If not, what are your barriers?  Yes     Patient Care Team:  Lawrence Friedman MD as PCP - General (Internal Medicine)  Lawrence Friedman MD as PCP - [facility] Provider  Juanito Moya MD (Gastroenterology)  Pema Butt MD as Referring Physician (Cardiology)  Karely Jon MD as Surgeon (Cardiothoracic Surgery)    Medical History Review  Past Medical, Family, and Social History reviewed and does contribute to the patient presenting condition    Health Maintenance   Topic Date Due    DTaP/Tdap/Td vaccine (1 - Tdap) Never done    Shingles vaccine (1 of 2) Never done    Pneumococcal 65+ years Vaccine (1 - PCV) Never done    Annual Wellness Visit (AWV)  02/05/2022    COVID-19 Vaccine (4 - Booster for Wren Peter series) 03/08/2022    Lipids  07/06/2022    Flu vaccine (1) Never done    Depression Screen  05/27/2023    DEXA (modify frequency per FRAX score)  Completed    Hepatitis C screen  Completed    Hepatitis A vaccine  Aged Out    Hib vaccine  Aged Out    Meningococcal (ACWY) vaccine  Aged Out

## 2022-10-20 ENCOUNTER — TELEPHONE (OUTPATIENT)
Dept: INTERNAL MEDICINE CLINIC | Age: 77
End: 2022-10-20

## 2022-10-20 DIAGNOSIS — Z78.0 POSTMENOPAUSAL: Primary | ICD-10-CM

## 2022-10-25 ENCOUNTER — TELEPHONE (OUTPATIENT)
Dept: INTERNAL MEDICINE CLINIC | Age: 77
End: 2022-10-25

## 2022-10-25 ENCOUNTER — HOSPITAL ENCOUNTER (OUTPATIENT)
Dept: WOMENS IMAGING | Age: 77
Discharge: HOME | End: 2022-10-27
Payer: MEDICARE

## 2022-10-25 DIAGNOSIS — Z78.0 POSTMENOPAUSAL: ICD-10-CM

## 2022-10-25 PROCEDURE — 77080 DXA BONE DENSITY AXIAL: CPT

## 2022-11-03 ENCOUNTER — HOSPITAL ENCOUNTER (OUTPATIENT)
Age: 77
Setting detail: SPECIMEN
Discharge: HOME | End: 2022-11-03

## 2022-11-03 DIAGNOSIS — I21.4 NSTEMI (NON-ST ELEVATED MYOCARDIAL INFARCTION) (HCC): ICD-10-CM

## 2022-11-03 DIAGNOSIS — Z13.220 SCREENING FOR HYPERLIPIDEMIA: ICD-10-CM

## 2022-11-03 LAB
ALBUMIN SERPL-MCNC: 4.3 G/DL (ref 3.5–5.2)
ALBUMIN/GLOBULIN RATIO: 1.5 (ref 1–2.5)
ALP BLD-CCNC: 41 U/L (ref 35–104)
ALT SERPL-CCNC: 16 U/L (ref 5–33)
ANION GAP SERPL CALCULATED.3IONS-SCNC: 16 MMOL/L (ref 9–17)
AST SERPL-CCNC: 22 U/L
BILIRUB SERPL-MCNC: 0.3 MG/DL (ref 0.3–1.2)
BUN BLDV-MCNC: 11 MG/DL (ref 8–23)
CALCIUM SERPL-MCNC: 9.5 MG/DL (ref 8.6–10.4)
CHLORIDE BLD-SCNC: 100 MMOL/L (ref 98–107)
CHOLESTEROL/HDL RATIO: 3.7
CHOLESTEROL: 159 MG/DL
CO2: 24 MMOL/L (ref 20–31)
CREAT SERPL-MCNC: 0.76 MG/DL (ref 0.5–0.9)
GFR SERPL CREATININE-BSD FRML MDRD: >60 ML/MIN/1.73M2
GLUCOSE BLD-MCNC: 80 MG/DL (ref 70–99)
HDLC SERPL-MCNC: 43 MG/DL
LDL CHOLESTEROL: 78 MG/DL (ref 0–130)
POTASSIUM SERPL-SCNC: 4.2 MMOL/L (ref 3.7–5.3)
SODIUM BLD-SCNC: 140 MMOL/L (ref 135–144)
TOTAL PROTEIN: 7.2 G/DL (ref 6.4–8.3)
TRIGL SERPL-MCNC: 191 MG/DL

## 2022-12-02 RX ORDER — TIZANIDINE 4 MG/1
4 TABLET ORAL 3 TIMES DAILY
Qty: 30 TABLET | Refills: 1 | Status: SHIPPED | OUTPATIENT
Start: 2022-12-02

## 2022-12-02 RX ORDER — MELOXICAM 15 MG/1
15 TABLET ORAL DAILY
Qty: 30 TABLET | Refills: 3 | Status: SHIPPED | OUTPATIENT
Start: 2022-12-02

## 2022-12-02 NOTE — TELEPHONE ENCOUNTER
LV 10/19/2022  NV N/A    Patient is experiencing back pain again she states it comes and goes but lasts several days.

## 2023-01-30 DIAGNOSIS — M81.0 OSTEOPOROSIS, UNSPECIFIED OSTEOPOROSIS TYPE, UNSPECIFIED PATHOLOGICAL FRACTURE PRESENCE: ICD-10-CM

## 2023-01-31 RX ORDER — ALENDRONATE SODIUM 70 MG/1
70 TABLET ORAL
Qty: 12 TABLET | Refills: 0 | Status: SHIPPED | OUTPATIENT
Start: 2023-01-31

## 2023-02-02 RX ORDER — FENOFIBRATE 48 MG/1
TABLET, COATED ORAL
Qty: 30 TABLET | Refills: 0 | Status: CANCELLED | OUTPATIENT
Start: 2023-02-02

## 2023-02-02 RX ORDER — ATORVASTATIN CALCIUM 20 MG/1
TABLET, FILM COATED ORAL
Qty: 90 TABLET | Refills: 1 | OUTPATIENT
Start: 2023-02-02

## 2023-02-06 RX ORDER — ATORVASTATIN CALCIUM 20 MG/1
TABLET, FILM COATED ORAL
Qty: 90 TABLET | Refills: 1 | OUTPATIENT
Start: 2023-02-06

## 2023-04-13 PROBLEM — I25.119 ATHEROSCLEROTIC HEART DISEASE OF NATIVE CORONARY ARTERY WITH UNSPECIFIED ANGINA PECTORIS (HCC): Status: ACTIVE | Noted: 2023-04-13

## 2023-05-02 ENCOUNTER — OFFICE VISIT (OUTPATIENT)
Dept: INTERNAL MEDICINE CLINIC | Age: 78
End: 2023-05-02
Payer: MEDICARE

## 2023-05-02 VITALS
OXYGEN SATURATION: 97 % | BODY MASS INDEX: 28.53 KG/M2 | WEIGHT: 151 LBS | SYSTOLIC BLOOD PRESSURE: 124 MMHG | DIASTOLIC BLOOD PRESSURE: 80 MMHG | HEART RATE: 70 BPM

## 2023-05-02 DIAGNOSIS — M54.32 LEFT SIDED SCIATICA: Primary | ICD-10-CM

## 2023-05-02 DIAGNOSIS — M25.552 LEFT HIP PAIN: ICD-10-CM

## 2023-05-02 PROCEDURE — 3079F DIAST BP 80-89 MM HG: CPT | Performed by: INTERNAL MEDICINE

## 2023-05-02 PROCEDURE — 3074F SYST BP LT 130 MM HG: CPT | Performed by: INTERNAL MEDICINE

## 2023-05-02 PROCEDURE — 99213 OFFICE O/P EST LOW 20 MIN: CPT | Performed by: INTERNAL MEDICINE

## 2023-05-02 PROCEDURE — 1123F ACP DISCUSS/DSCN MKR DOCD: CPT | Performed by: INTERNAL MEDICINE

## 2023-05-02 RX ORDER — METHYLPREDNISOLONE 4 MG/1
TABLET ORAL
Qty: 1 KIT | Refills: 0 | Status: SHIPPED | OUTPATIENT
Start: 2023-05-02 | End: 2023-05-08

## 2023-05-02 ASSESSMENT — ENCOUNTER SYMPTOMS
GASTROINTESTINAL NEGATIVE: 1
EYES NEGATIVE: 1
RESPIRATORY NEGATIVE: 1

## 2023-05-02 NOTE — PROGRESS NOTES
[address] [address]  Dept: [phone]  Dept Fax: [phone]    Giuliano Yates is a [de-identified] y.o. female who presents today for her medicalconditions/complaints as noted below. Giuliano Yates is c/o of Hip Pain (Left side, noticed 4/25/23)      HPI:     Hip Pain   The incident occurred more than 1 week ago. There was no injury mechanism. The pain is present in the left hip. The quality of the pain is described as stabbing. The pain is moderate. The symptoms are aggravated by weight bearing (walking). She has tried NSAIDs and acetaminophen for the symptoms. The treatment provided mild relief. Past Medical History:   Diagnosis Date    Diverticulosis of colon     Full dentures     History of colon polyps 04/22/2017    Hyperlipidemia     Hypertension     Osteoarthritis     Osteoarthritis of hand     Osteoarthritis of lower leg, localized     Osteoporosis     Psoriasis     ON TOP & BACK OF  HEAD    Sinus congestion     IN AM    Tubulovillous adenoma of colon     Wears glasses         Current Outpatient Medications   Medication Sig Dispense Refill    methylPREDNISolone (MEDROL DOSEPACK) 4 MG tablet Take by mouth. 1 kit 0    meloxicam (MOBIC) 15 MG tablet Take 1 tablet by mouth daily 30 tablet 3    tiZANidine (ZANAFLEX) 4 MG tablet Take 1 tablet by mouth 3 times daily 30 tablet 1    carvedilol (COREG) 12.5 MG tablet Take 1 tablet by mouth 2 times daily 180 tablet 3    clopidogrel (PLAVIX) 75 MG tablet       fenofibrate (TRICOR) 48 MG tablet TAKE ONE TABLET BY MOUTH DAILY 30 tablet 0    hydrochlorothiazide (HYDRODIURIL) 12.5 MG tablet Take 1 tablet by mouth daily      atorvastatin (LIPITOR) 20 MG tablet Take 1 tablet by mouth daily 90 tablet 1    nitroGLYCERIN (NITROSTAT) 0.4 MG SL tablet Place 1 tablet under the tongue every 5 minutes as needed for Chest pain up to max of 3 total doses.  If no relief after 1 dose, call 011. 25 tablet 3    Vitamin D

## 2023-06-28 RX ORDER — TIZANIDINE 4 MG/1
TABLET ORAL
Qty: 30 TABLET | Refills: 1 | Status: SHIPPED | OUTPATIENT
Start: 2023-06-28

## 2023-08-15 ENCOUNTER — OFFICE VISIT (OUTPATIENT)
Dept: INTERNAL MEDICINE CLINIC | Age: 78
End: 2023-08-15
Payer: MEDICARE

## 2023-08-15 VITALS
BODY MASS INDEX: 28.09 KG/M2 | DIASTOLIC BLOOD PRESSURE: 64 MMHG | OXYGEN SATURATION: 97 % | HEIGHT: 61 IN | HEART RATE: 55 BPM | SYSTOLIC BLOOD PRESSURE: 102 MMHG | WEIGHT: 148.8 LBS

## 2023-08-15 DIAGNOSIS — I25.10 CORONARY ARTERY DISEASE INVOLVING NATIVE CORONARY ARTERY OF NATIVE HEART WITHOUT ANGINA PECTORIS: ICD-10-CM

## 2023-08-15 DIAGNOSIS — M81.0 OSTEOPOROSIS, UNSPECIFIED OSTEOPOROSIS TYPE, UNSPECIFIED PATHOLOGICAL FRACTURE PRESENCE: ICD-10-CM

## 2023-08-15 DIAGNOSIS — Z91.81 AT HIGH RISK FOR FALLS: ICD-10-CM

## 2023-08-15 DIAGNOSIS — I10 ESSENTIAL HYPERTENSION: Primary | ICD-10-CM

## 2023-08-15 PROCEDURE — 3078F DIAST BP <80 MM HG: CPT | Performed by: INTERNAL MEDICINE

## 2023-08-15 PROCEDURE — 99214 OFFICE O/P EST MOD 30 MIN: CPT | Performed by: INTERNAL MEDICINE

## 2023-08-15 PROCEDURE — 1123F ACP DISCUSS/DSCN MKR DOCD: CPT | Performed by: INTERNAL MEDICINE

## 2023-08-15 PROCEDURE — 3074F SYST BP LT 130 MM HG: CPT | Performed by: INTERNAL MEDICINE

## 2023-08-15 ASSESSMENT — ENCOUNTER SYMPTOMS
EYE DISCHARGE: 0
EYE ITCHING: 0
APNEA: 0
ABDOMINAL DISTENTION: 0
COLOR CHANGE: 0
ABDOMINAL PAIN: 0
SHORTNESS OF BREATH: 0
CONSTIPATION: 0
DIARRHEA: 0
BLOOD IN STOOL: 0
CHOKING: 0
EYE PAIN: 0
COUGH: 0
CHEST TIGHTNESS: 0
BACK PAIN: 0
EYE REDNESS: 0

## 2023-08-15 NOTE — PROGRESS NOTES
Subjective:      Patient ID: Ebony Mejia is a 78 y.o. female. HPIPatient is here for evaluation multiple medical problems. She has hypertension, hyperlipidemia, coronary artery disease status post stent s/p CABG in 2015 , osteopenia . Patient is compliant with diet and medication,  She checks her BP at Home regularly , her BP is OK most of time   nO chest Pain, SOB with Excretion, Follows with Dr Tito Felix  Had DEXA scan , suggestive of Osteopenia , taking calcium/vitamin d     Review of Systems   Constitutional:  Negative for activity change, appetite change, chills, diaphoresis, fatigue and fever. HENT:  Negative for congestion, dental problem, drooling and ear discharge. Eyes:  Negative for pain, discharge, redness and itching. Respiratory:  Negative for apnea, cough, choking, chest tightness and shortness of breath. Cardiovascular:  Negative for chest pain and leg swelling. Gastrointestinal:  Negative for abdominal distention, abdominal pain, blood in stool, constipation and diarrhea. Endocrine: Negative for cold intolerance and heat intolerance. Genitourinary:  Negative for difficulty urinating, dysuria, enuresis, flank pain and frequency. Musculoskeletal:  Negative for arthralgias, back pain, gait problem and joint swelling. Skin:  Negative for color change, pallor and rash. Neurological:  Negative for dizziness, facial asymmetry, light-headedness, numbness and headaches. Psychiatric/Behavioral:  Negative for agitation, behavioral problems, confusion, decreased concentration and dysphoric mood. Objective:   Physical Exam  Constitutional:       Appearance: She is well-developed. She is not diaphoretic. HENT:      Head: Normocephalic and atraumatic. Mouth/Throat:      Pharynx: No oropharyngeal exudate. Eyes:      General: No scleral icterus. Right eye: No discharge. Left eye: No discharge.       Conjunctiva/sclera: Conjunctivae normal.      Pupils: Pupils

## 2023-09-29 ENCOUNTER — HOSPITAL ENCOUNTER (OUTPATIENT)
Dept: GENERAL RADIOLOGY | Facility: CLINIC | Age: 78
End: 2023-09-29
Payer: MEDICARE

## 2023-09-29 ENCOUNTER — OFFICE VISIT (OUTPATIENT)
Dept: INTERNAL MEDICINE CLINIC | Age: 78
End: 2023-09-29

## 2023-09-29 ENCOUNTER — HOSPITAL ENCOUNTER (OUTPATIENT)
Age: 78
Setting detail: SPECIMEN
Discharge: HOME | End: 2023-09-29

## 2023-09-29 ENCOUNTER — HOSPITAL ENCOUNTER (OUTPATIENT)
Facility: CLINIC | Age: 78
End: 2023-09-29
Payer: MEDICARE

## 2023-09-29 VITALS
WEIGHT: 150 LBS | SYSTOLIC BLOOD PRESSURE: 122 MMHG | HEART RATE: 65 BPM | DIASTOLIC BLOOD PRESSURE: 76 MMHG | OXYGEN SATURATION: 98 % | BODY MASS INDEX: 28.34 KG/M2

## 2023-09-29 DIAGNOSIS — M54.6 ACUTE MIDLINE THORACIC BACK PAIN: ICD-10-CM

## 2023-09-29 DIAGNOSIS — M54.6 ACUTE MIDLINE THORACIC BACK PAIN: Primary | ICD-10-CM

## 2023-09-29 PROCEDURE — 72072 X-RAY EXAM THORAC SPINE 3VWS: CPT

## 2023-09-29 RX ORDER — TIZANIDINE 4 MG/1
4 TABLET ORAL 3 TIMES DAILY
Qty: 30 TABLET | Refills: 1 | Status: SHIPPED | OUTPATIENT
Start: 2023-09-29

## 2023-09-29 RX ORDER — TRAMADOL HYDROCHLORIDE 50 MG/1
50 TABLET ORAL EVERY 6 HOURS PRN
Qty: 20 TABLET | Refills: 0 | Status: SHIPPED | OUTPATIENT
Start: 2023-09-29 | End: 2023-10-04

## 2023-09-29 RX ORDER — MELOXICAM 15 MG/1
15 TABLET ORAL DAILY
Qty: 30 TABLET | Refills: 3 | Status: SHIPPED | OUTPATIENT
Start: 2023-09-29

## 2023-09-29 NOTE — PROGRESS NOTES
Visit Information    Have you changed or started any medications since your last visit including any over-the-counter medicines, vitamins, or herbal medicines? no   Are you having any side effects from any of your medications? -  no  Have you stopped taking any of your medications? Is so, why? -  no    Have you seen any other physician or provider since your last visit? No  Have you had any other diagnostic tests since your last visit? No  Have you been seen in the emergency room and/or had an admission to a hospital since we last saw you? No  Have you had your routine dental cleaning in the past 6 months? no    Have you activated your [employer] account? If not, what are your barriers?  Yes     Patient Care Team:  Vane Rush MD as PCP - General (Internal Medicine)  Vane Rush MD as PCP - Empaneled Provider  Samanta Benjamin MD (Gastroenterology)  Jaz Ramirez MD as Referring Physician (Cardiology)  Humberto Caldwell MD as Surgeon (Cardiothoracic Surgery)    Medical History Review  Past Medical, Family, and Social History reviewed and does not contribute to the patient presenting condition    Health Maintenance   Topic Date Due    DTaP/Tdap/Td vaccine (1 - Tdap) Never done    Shingles vaccine (1 of 2) Never done    Pneumococcal 65+ years Vaccine (1 - PCV) Never done    COVID-19 Vaccine (4 - Pfizer series) 01/03/2022    Annual Wellness Visit (AWV)  02/05/2022    Flu vaccine (1) Never done    Lipids  11/03/2023    Depression Screen  04/13/2024    DEXA (modify frequency per FRAX score)  Completed    Hepatitis A vaccine  Aged Out    Hepatitis B vaccine  Aged Out    Hib vaccine  Aged Out    Meningococcal (ACWY) vaccine  Aged Out    Hepatitis C screen  Discontinued
lower thoracic region  Musculoskeletal: Muscular strength good bilaterally, strength and sensations intact bilaterally  Neurological: Alert oriented x3  Lab Results   Component Value Date    LABA1C 5.7 12/19/2015     Lab Results   Component Value Date     12/19/2015      LABORATORY FINDINGS:    CBC:  Lab Results   Component Value Date/Time    WBC 10.4 12/23/2020 04:15 PM    HGB 13.1 12/23/2020 04:15 PM     12/23/2020 04:15 PM     03/06/2012 08:48 AM       BMP:    Lab Results   Component Value Date/Time     11/03/2022 08:20 AM    K 4.2 11/03/2022 08:20 AM     11/03/2022 08:20 AM    CO2 24 11/03/2022 08:20 AM    BUN 11 11/03/2022 08:20 AM    CREATININE 0.76 11/03/2022 08:20 AM    GLUCOSE 80 11/03/2022 08:20 AM    GLUCOSE 95 03/06/2012 08:48 AM       HEMOGLOBIN A1C:   Hemoglobin A1C (%)   Date Value   12/19/2015 5.7        FASTING LIPID PANEL:No results found for: \"LIPIDPAN\"    TSH:No results found for: \"TSHREFFT4\"    No results found for: \"TSHREFFT4\"     No valid procedures specified. ASSESSMENT AND PLAN:      Diagnoses and all orders for this visit:  Acute midline thoracic back pain  -     CK; Future  -     XR THORACIC SPINE (3 VIEWS); Future  -     traMADol (ULTRAM) 50 MG tablet; Take 1 tablet by mouth every 6 hours as needed for Pain for up to 5 days. Intended supply: 5 days. Take lowest dose possible to manage pain Max Daily Amount: 200 mg  -     Basic Metabolic Panel; Future  Other orders  -     tiZANidine (ZANAFLEX) 4 MG tablet; Take 1 tablet by mouth 3 times daily  -     meloxicam (MOBIC) 15 MG tablet; Take 1 tablet by mouth daily       Back pain, acute. No focal neurological deficits. Explained to the patient that combination of fibrates and atorvastatin can cause myopathy and rhabdomyolysis. Recommended CK total, x-ray thoracic spine, advised to continue using warm compresses, topical Bengay (reports she is unable to apply cream to the area given location).   Continue

## 2023-09-29 NOTE — PATIENT INSTRUCTIONS
Continue tizanidine  Hold atorvastatin and fenofibrate for the next week  Continue warm compresses, tizanidine and meloxicam

## 2023-09-30 LAB
ANION GAP SERPL CALCULATED.3IONS-SCNC: 12 MMOL/L (ref 9–17)
BUN SERPL-MCNC: 16 MG/DL (ref 8–23)
CALCIUM SERPL-MCNC: 10.1 MG/DL (ref 8.6–10.4)
CHLORIDE SERPL-SCNC: 96 MMOL/L (ref 98–107)
CK SERPL-CCNC: 140 U/L (ref 26–192)
CO2 SERPL-SCNC: 26 MMOL/L (ref 20–31)
CREAT SERPL-MCNC: 0.8 MG/DL (ref 0.5–0.9)
GFR SERPL CREATININE-BSD FRML MDRD: >60 ML/MIN/1.73M2
GLUCOSE SERPL-MCNC: 78 MG/DL (ref 70–99)
POTASSIUM SERPL-SCNC: 4 MMOL/L (ref 3.7–5.3)
SODIUM SERPL-SCNC: 134 MMOL/L (ref 135–144)

## 2023-10-10 ENCOUNTER — OFFICE VISIT (OUTPATIENT)
Dept: INTERNAL MEDICINE CLINIC | Age: 78
End: 2023-10-10
Payer: MEDICARE

## 2023-10-10 VITALS
OXYGEN SATURATION: 95 % | WEIGHT: 148.6 LBS | SYSTOLIC BLOOD PRESSURE: 130 MMHG | HEART RATE: 67 BPM | BODY MASS INDEX: 28.05 KG/M2 | DIASTOLIC BLOOD PRESSURE: 74 MMHG | HEIGHT: 61 IN

## 2023-10-10 DIAGNOSIS — M81.0 OSTEOPOROSIS, UNSPECIFIED OSTEOPOROSIS TYPE, UNSPECIFIED PATHOLOGICAL FRACTURE PRESENCE: ICD-10-CM

## 2023-10-10 DIAGNOSIS — I25.10 CORONARY ARTERY DISEASE INVOLVING NATIVE CORONARY ARTERY OF NATIVE HEART WITHOUT ANGINA PECTORIS: ICD-10-CM

## 2023-10-10 DIAGNOSIS — I10 ESSENTIAL HYPERTENSION: ICD-10-CM

## 2023-10-10 DIAGNOSIS — I21.4 NSTEMI (NON-ST ELEVATED MYOCARDIAL INFARCTION) (HCC): Primary | ICD-10-CM

## 2023-10-10 PROCEDURE — 3078F DIAST BP <80 MM HG: CPT | Performed by: INTERNAL MEDICINE

## 2023-10-10 PROCEDURE — 1123F ACP DISCUSS/DSCN MKR DOCD: CPT | Performed by: INTERNAL MEDICINE

## 2023-10-10 PROCEDURE — 99214 OFFICE O/P EST MOD 30 MIN: CPT | Performed by: INTERNAL MEDICINE

## 2023-10-10 PROCEDURE — 3074F SYST BP LT 130 MM HG: CPT | Performed by: INTERNAL MEDICINE

## 2023-10-10 NOTE — PROGRESS NOTES
Subjective:      Patient ID: Nasra Whaley is a 78 y.o. female. HPI- Patient is here for evaluation multiple medical problems. She has hypertension, hyperlipidemia, coronary artery disease status post stent s/p CABG in 2015 , osteopenia . Patient is compliant with diet and medication,  She checks her BP at Home regularly , her BP is OK most of time   Her back pain is much improved   Review of Systems   Constitutional:  Negative for activity change, appetite change, chills, diaphoresis, fatigue and fever. HENT:  Negative for congestion, dental problem, drooling and ear discharge. Eyes:  Negative for pain, discharge, redness and itching. Respiratory:  Negative for apnea, cough, choking, chest tightness and shortness of breath. Cardiovascular:  Negative for chest pain and leg swelling. Gastrointestinal:  Negative for abdominal distention, abdominal pain, blood in stool, constipation and diarrhea. Endocrine: Negative for cold intolerance and heat intolerance. Genitourinary:  Negative for difficulty urinating, dysuria, enuresis, flank pain and frequency. Musculoskeletal:  Negative for arthralgias, back pain, gait problem and joint swelling. Skin:  Negative for color change, pallor and rash. Neurological:  Negative for dizziness, facial asymmetry, light-headedness, numbness and headaches. Psychiatric/Behavioral:  Negative for agitation, behavioral problems, confusion, decreased concentration and dysphoric mood. Objective:   Physical Exam  Constitutional:       Appearance: She is well-developed. She is not diaphoretic. HENT:      Head: Normocephalic and atraumatic. Mouth/Throat:      Pharynx: No oropharyngeal exudate. Eyes:      General: No scleral icterus. Right eye: No discharge. Left eye: No discharge. Conjunctiva/sclera: Conjunctivae normal.      Pupils: Pupils are equal, round, and reactive to light. Neck:      Thyroid: No thyromegaly.       Vascular:

## 2023-10-11 ASSESSMENT — ENCOUNTER SYMPTOMS
CHEST TIGHTNESS: 0
ABDOMINAL DISTENTION: 0
CONSTIPATION: 0
EYE ITCHING: 0
COUGH: 0
EYE PAIN: 0
SHORTNESS OF BREATH: 0
EYE REDNESS: 0
DIARRHEA: 0
CHOKING: 0
BACK PAIN: 0
COLOR CHANGE: 0
BLOOD IN STOOL: 0
APNEA: 0
ABDOMINAL PAIN: 0
EYE DISCHARGE: 0

## 2023-10-17 ENCOUNTER — APPOINTMENT (OUTPATIENT)
Dept: CT IMAGING | Age: 78
DRG: 641 | End: 2023-10-17
Payer: MEDICARE

## 2023-10-17 ENCOUNTER — HOSPITAL ENCOUNTER (INPATIENT)
Age: 78
LOS: 2 days | Discharge: HOME | DRG: 641 | End: 2023-10-19
Attending: EMERGENCY MEDICINE | Admitting: INTERNAL MEDICINE
Payer: MEDICARE

## 2023-10-17 ENCOUNTER — APPOINTMENT (OUTPATIENT)
Dept: GENERAL RADIOLOGY | Age: 78
DRG: 641 | End: 2023-10-17
Payer: MEDICARE

## 2023-10-17 DIAGNOSIS — R55 SYNCOPE, UNSPECIFIED SYNCOPE TYPE: Primary | ICD-10-CM

## 2023-10-17 DIAGNOSIS — R55 SYNCOPE AND COLLAPSE: ICD-10-CM

## 2023-10-17 DIAGNOSIS — R94.31 ELECTROCARDIOGRAM SHOWING T WAVE ABNORMALITIES: ICD-10-CM

## 2023-10-17 LAB
ALBUMIN SERPL-MCNC: 4.8 G/DL (ref 3.5–5.2)
ALP SERPL-CCNC: 51 U/L (ref 35–104)
ALT SERPL-CCNC: 21 U/L (ref 5–33)
ANION GAP SERPL CALCULATED.3IONS-SCNC: 11 MMOL/L (ref 9–17)
AST SERPL-CCNC: 24 U/L
BASOPHILS # BLD: 0 K/UL (ref 0–0.2)
BASOPHILS NFR BLD: 0 % (ref 0–2)
BILIRUB SERPL-MCNC: 0.3 MG/DL (ref 0.3–1.2)
BUN SERPL-MCNC: 20 MG/DL (ref 8–23)
CALCIUM SERPL-MCNC: 11.2 MG/DL (ref 8.6–10.4)
CHLORIDE SERPL-SCNC: 94 MMOL/L (ref 98–107)
CO2 SERPL-SCNC: 28 MMOL/L (ref 20–31)
CREAT SERPL-MCNC: 0.9 MG/DL (ref 0.5–0.9)
EOSINOPHIL # BLD: 0.1 K/UL (ref 0–0.4)
EOSINOPHILS RELATIVE PERCENT: 1 % (ref 0–4)
ERYTHROCYTE [DISTWIDTH] IN BLOOD BY AUTOMATED COUNT: 13.8 % (ref 11.5–14.9)
GFR SERPL CREATININE-BSD FRML MDRD: >60 ML/MIN/1.73M2
GLUCOSE SERPL-MCNC: 102 MG/DL (ref 70–99)
HCT VFR BLD AUTO: 39.2 % (ref 36–46)
HGB BLD-MCNC: 13.1 G/DL (ref 12–16)
LYMPHOCYTES NFR BLD: 2.3 K/UL (ref 1–4.8)
LYMPHOCYTES RELATIVE PERCENT: 29 % (ref 24–44)
MAGNESIUM SERPL-MCNC: 2 MG/DL (ref 1.6–2.6)
MCH RBC QN AUTO: 29 PG (ref 26–34)
MCHC RBC AUTO-ENTMCNC: 33.5 G/DL (ref 31–37)
MCV RBC AUTO: 86.5 FL (ref 80–100)
MONOCYTES NFR BLD: 0.7 K/UL (ref 0.1–1.3)
MONOCYTES NFR BLD: 9 % (ref 1–7)
NEUTROPHILS NFR BLD: 61 % (ref 36–66)
NEUTS SEG NFR BLD: 4.9 K/UL (ref 1.3–9.1)
PLATELET # BLD AUTO: 262 K/UL (ref 150–450)
PMV BLD AUTO: 7.5 FL (ref 6–12)
POTASSIUM SERPL-SCNC: 3.8 MMOL/L (ref 3.7–5.3)
PROT SERPL-MCNC: 7.2 G/DL (ref 6.4–8.3)
RBC # BLD AUTO: 4.54 M/UL (ref 4–5.2)
SODIUM SERPL-SCNC: 133 MMOL/L (ref 135–144)
TROPONIN I SERPL HS-MCNC: 13 NG/L (ref 0–14)
TROPONIN I SERPL HS-MCNC: 15 NG/L (ref 0–14)
WBC OTHER # BLD: 7.9 K/UL (ref 3.5–11)

## 2023-10-17 PROCEDURE — 99285 EMERGENCY DEPT VISIT HI MDM: CPT

## 2023-10-17 PROCEDURE — 2060000000 HC ICU INTERMEDIATE R&B

## 2023-10-17 PROCEDURE — 72125 CT NECK SPINE W/O DYE: CPT

## 2023-10-17 PROCEDURE — 85025 COMPLETE CBC W/AUTO DIFF WBC: CPT

## 2023-10-17 PROCEDURE — 71045 X-RAY EXAM CHEST 1 VIEW: CPT

## 2023-10-17 PROCEDURE — 70450 CT HEAD/BRAIN W/O DYE: CPT

## 2023-10-17 PROCEDURE — 2580000003 HC RX 258: Performed by: NURSE PRACTITIONER

## 2023-10-17 PROCEDURE — 83735 ASSAY OF MAGNESIUM: CPT

## 2023-10-17 PROCEDURE — 84484 ASSAY OF TROPONIN QUANT: CPT

## 2023-10-17 PROCEDURE — 6370000000 HC RX 637 (ALT 250 FOR IP): Performed by: NURSE PRACTITIONER

## 2023-10-17 PROCEDURE — 36415 COLL VENOUS BLD VENIPUNCTURE: CPT

## 2023-10-17 PROCEDURE — 93005 ELECTROCARDIOGRAM TRACING: CPT | Performed by: EMERGENCY MEDICINE

## 2023-10-17 PROCEDURE — 80053 COMPREHEN METABOLIC PANEL: CPT

## 2023-10-17 RX ORDER — ONDANSETRON 4 MG/1
4 TABLET, ORALLY DISINTEGRATING ORAL EVERY 8 HOURS PRN
Status: DISCONTINUED | OUTPATIENT
Start: 2023-10-17 | End: 2023-10-19 | Stop reason: HOSPADM

## 2023-10-17 RX ORDER — ACETAMINOPHEN 650 MG/1
650 SUPPOSITORY RECTAL EVERY 6 HOURS PRN
Status: DISCONTINUED | OUTPATIENT
Start: 2023-10-17 | End: 2023-10-19 | Stop reason: HOSPADM

## 2023-10-17 RX ORDER — ASPIRIN 81 MG/1
81 TABLET ORAL DAILY
COMMUNITY

## 2023-10-17 RX ORDER — ATORVASTATIN CALCIUM 20 MG/1
20 TABLET, FILM COATED ORAL NIGHTLY
Status: DISCONTINUED | OUTPATIENT
Start: 2023-10-17 | End: 2023-10-19 | Stop reason: HOSPADM

## 2023-10-17 RX ORDER — CLOPIDOGREL BISULFATE 75 MG/1
75 TABLET ORAL DAILY
Status: DISCONTINUED | OUTPATIENT
Start: 2023-10-18 | End: 2023-10-19 | Stop reason: HOSPADM

## 2023-10-17 RX ORDER — HYDROCHLOROTHIAZIDE 25 MG/1
12.5 TABLET ORAL DAILY
Status: DISCONTINUED | OUTPATIENT
Start: 2023-10-18 | End: 2023-10-19

## 2023-10-17 RX ORDER — TIZANIDINE 4 MG/1
4 TABLET ORAL EVERY 8 HOURS PRN
Status: DISCONTINUED | OUTPATIENT
Start: 2023-10-17 | End: 2023-10-19 | Stop reason: HOSPADM

## 2023-10-17 RX ORDER — VITAMIN B COMPLEX
1000 TABLET ORAL DAILY
Status: DISCONTINUED | OUTPATIENT
Start: 2023-10-18 | End: 2023-10-19 | Stop reason: HOSPADM

## 2023-10-17 RX ORDER — ASPIRIN 81 MG/1
81 TABLET ORAL DAILY
Status: DISCONTINUED | OUTPATIENT
Start: 2023-10-18 | End: 2023-10-19 | Stop reason: HOSPADM

## 2023-10-17 RX ORDER — CARVEDILOL 12.5 MG/1
12.5 TABLET ORAL 2 TIMES DAILY
Status: DISCONTINUED | OUTPATIENT
Start: 2023-10-17 | End: 2023-10-19

## 2023-10-17 RX ORDER — ASCORBIC ACID 500 MG
500 TABLET ORAL DAILY
Status: DISCONTINUED | OUTPATIENT
Start: 2023-10-18 | End: 2023-10-19 | Stop reason: HOSPADM

## 2023-10-17 RX ORDER — SODIUM CHLORIDE 0.9 % (FLUSH) 0.9 %
5-40 SYRINGE (ML) INJECTION PRN
Status: DISCONTINUED | OUTPATIENT
Start: 2023-10-17 | End: 2023-10-19 | Stop reason: HOSPADM

## 2023-10-17 RX ORDER — ONDANSETRON 2 MG/ML
4 INJECTION INTRAMUSCULAR; INTRAVENOUS EVERY 6 HOURS PRN
Status: DISCONTINUED | OUTPATIENT
Start: 2023-10-17 | End: 2023-10-19 | Stop reason: HOSPADM

## 2023-10-17 RX ORDER — ACETAMINOPHEN 325 MG/1
650 TABLET ORAL EVERY 6 HOURS PRN
Status: DISCONTINUED | OUTPATIENT
Start: 2023-10-17 | End: 2023-10-19 | Stop reason: HOSPADM

## 2023-10-17 RX ORDER — M-VIT,TX,IRON,MINS/CALC/FOLIC 27MG-0.4MG
1 TABLET ORAL DAILY
Status: DISCONTINUED | OUTPATIENT
Start: 2023-10-18 | End: 2023-10-19 | Stop reason: HOSPADM

## 2023-10-17 RX ORDER — SODIUM CHLORIDE 9 MG/ML
INJECTION, SOLUTION INTRAVENOUS CONTINUOUS
Status: DISCONTINUED | OUTPATIENT
Start: 2023-10-17 | End: 2023-10-19 | Stop reason: HOSPADM

## 2023-10-17 RX ORDER — FENOFIBRATE 54 MG/1
54 TABLET ORAL DAILY
Status: DISCONTINUED | OUTPATIENT
Start: 2023-10-18 | End: 2023-10-19 | Stop reason: HOSPADM

## 2023-10-17 RX ORDER — SODIUM CHLORIDE 9 MG/ML
INJECTION, SOLUTION INTRAVENOUS PRN
Status: DISCONTINUED | OUTPATIENT
Start: 2023-10-17 | End: 2023-10-19 | Stop reason: HOSPADM

## 2023-10-17 RX ORDER — SODIUM CHLORIDE 0.9 % (FLUSH) 0.9 %
5-40 SYRINGE (ML) INJECTION EVERY 12 HOURS SCHEDULED
Status: DISCONTINUED | OUTPATIENT
Start: 2023-10-17 | End: 2023-10-19 | Stop reason: HOSPADM

## 2023-10-17 RX ORDER — ENOXAPARIN SODIUM 100 MG/ML
40 INJECTION SUBCUTANEOUS DAILY
Status: DISCONTINUED | OUTPATIENT
Start: 2023-10-18 | End: 2023-10-19 | Stop reason: HOSPADM

## 2023-10-17 RX ORDER — POLYETHYLENE GLYCOL 3350 17 G/17G
17 POWDER, FOR SOLUTION ORAL DAILY PRN
Status: DISCONTINUED | OUTPATIENT
Start: 2023-10-17 | End: 2023-10-19 | Stop reason: HOSPADM

## 2023-10-17 RX ADMIN — SODIUM CHLORIDE, PRESERVATIVE FREE 10 ML: 5 INJECTION INTRAVENOUS at 21:57

## 2023-10-17 RX ADMIN — ATORVASTATIN CALCIUM 20 MG: 20 TABLET, FILM COATED ORAL at 21:57

## 2023-10-17 RX ADMIN — CARVEDILOL 12.5 MG: 12.5 TABLET, FILM COATED ORAL at 21:57

## 2023-10-17 ASSESSMENT — PAIN - FUNCTIONAL ASSESSMENT: PAIN_FUNCTIONAL_ASSESSMENT: NONE - DENIES PAIN

## 2023-10-17 ASSESSMENT — ENCOUNTER SYMPTOMS
VOMITING: 0
ABDOMINAL PAIN: 0
NAUSEA: 0
COUGH: 0
SHORTNESS OF BREATH: 0

## 2023-10-17 NOTE — ED PROVIDER NOTES
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    Thyroid nodule E04.1    Hypokalemia E87.6    NSTEMI (non-ST elevated myocardial infarction) ([address]) I21.4    History of colon polyps Z86.010    Syncope and collapse R55    CAD (coronary artery disease) I25.10    Stented coronary artery Z95.5    Acute bilateral low back pain without sciatica M54.50    SAH (subarachnoid hemorrhage) ([address]) I60.9    Atherosclerotic heart disease of native coronary artery with unspecified angina pectoris I25.119    Electrocardiogram showing T wave abnormalities R94.31     SURGICAL HISTORY       Past Surgical History:   Procedure Laterality Date    CARDIAC CATHETERIZATION      2 stents    COLONOSCOPY  6/14/2013    diverticulosis, tubulovillous adenoma, questionable prominent mucosa anorectal area, focus of superficial denudation consistent with focal active colitis    COLONOSCOPY  10/25/2013    residual polyp lower right colon, another polyp in the cecum, biopsy benign    COLONOSCOPY  04/22/2017    Total colonoscopy and biopsy and cauterization of polyps, pathology-sessile serrated adenoma x3    COLONOSCOPY N/A 6/16/2018    COLONOSCOPY POLYPECTOMY HOT BIOPSY performed by Sarah Raya MD at [address] [place] [address]  12/22/15    OP CABG X 5- Dr Juan C Chaves (CERVIX STATUS UNKNOWN)      PARTIAL    JOINT REPLACEMENT Bilateral     knee, LT. KNEE X 2     KNEE ARTHROPLASTY Right 12/8/14    ID COLSC FLX W/REMOVAL LESION BY HOT BX FORCEPS N/A 4/22/2017    COLONOSCOPY POLYPECTOMY HOT BIOPSY performed by Toño Irizarry

## 2023-10-17 NOTE — ED TRIAGE NOTES
Mode of arrival (squad #, walk in, police, etc) : walk-in [facility]    Chief complaint(s): dizziness / lightheadedness    Arrival Note (brief scenario, treatment PTA, etc). : Pt arrives to ED c/o a single episode of near syncope at home. States she's been feeling dizzy and lightheaded. Was able to lower herself to the floor in front of her chair, so she didn't fall. C= \"Have you ever felt that you should Cut down on your drinking? \"  No  A= \"Have people Annoyed you by criticizing your drinking? \"  No  G= \"Have you ever felt bad or Guilty about your drinking? \"  No  E= \"Have you ever had a drink as an Eye-opener first thing in the morning to steady your nerves or to help a hangover? \"  No      Deferred []      Reason for deferring: N/A    *If yes to two or more: probable alcohol abuse. *

## 2023-10-18 ENCOUNTER — APPOINTMENT (OUTPATIENT)
Age: 78
DRG: 641 | End: 2023-10-18
Payer: MEDICARE

## 2023-10-18 PROBLEM — R94.31 ELECTROCARDIOGRAM SHOWING T WAVE ABNORMALITIES: Status: ACTIVE | Noted: 2023-10-18

## 2023-10-18 LAB
ANION GAP SERPL CALCULATED.3IONS-SCNC: 12 MMOL/L (ref 9–17)
BACTERIA URNS QL MICRO: ABNORMAL
BASOPHILS # BLD: 0 K/UL (ref 0–0.2)
BASOPHILS NFR BLD: 0 % (ref 0–2)
BILIRUB UR QL STRIP: NEGATIVE
BNP SERPL-MCNC: 67 PG/ML
BUN SERPL-MCNC: 17 MG/DL (ref 8–23)
CALCIUM SERPL-MCNC: 9.8 MG/DL (ref 8.6–10.4)
CASTS #/AREA URNS LPF: ABNORMAL /LPF
CHLORIDE SERPL-SCNC: 99 MMOL/L (ref 98–107)
CLARITY UR: CLEAR
CO2 SERPL-SCNC: 26 MMOL/L (ref 20–31)
COLOR UR: YELLOW
CREAT SERPL-MCNC: 0.7 MG/DL (ref 0.5–0.9)
EKG ATRIAL RATE: 58 BPM
EKG ATRIAL RATE: 59 BPM
EKG P AXIS: 29 DEGREES
EKG P AXIS: 31 DEGREES
EKG P-R INTERVAL: 236 MS
EKG P-R INTERVAL: 250 MS
EKG Q-T INTERVAL: 412 MS
EKG Q-T INTERVAL: 422 MS
EKG QRS DURATION: 102 MS
EKG QRS DURATION: 96 MS
EKG QTC CALCULATION (BAZETT): 404 MS
EKG QTC CALCULATION (BAZETT): 417 MS
EKG R AXIS: -12 DEGREES
EKG R AXIS: -5 DEGREES
EKG T AXIS: 114 DEGREES
EKG T AXIS: 126 DEGREES
EKG VENTRICULAR RATE: 58 BPM
EKG VENTRICULAR RATE: 59 BPM
EOSINOPHIL # BLD: 0.1 K/UL (ref 0–0.4)
EOSINOPHILS RELATIVE PERCENT: 2 % (ref 0–4)
EPI CELLS #/AREA URNS HPF: ABNORMAL /HPF
ERYTHROCYTE [DISTWIDTH] IN BLOOD BY AUTOMATED COUNT: 13.6 % (ref 11.5–14.9)
GFR SERPL CREATININE-BSD FRML MDRD: >60 ML/MIN/1.73M2
GLUCOSE SERPL-MCNC: 105 MG/DL (ref 70–99)
GLUCOSE UR STRIP-MCNC: NEGATIVE MG/DL
HCT VFR BLD AUTO: 36.9 % (ref 36–46)
HGB BLD-MCNC: 12.4 G/DL (ref 12–16)
HGB UR QL STRIP.AUTO: NEGATIVE
KETONES UR STRIP-MCNC: NEGATIVE MG/DL
LEUKOCYTE ESTERASE UR QL STRIP: ABNORMAL
LYMPHOCYTES NFR BLD: 2.5 K/UL (ref 1–4.8)
LYMPHOCYTES RELATIVE PERCENT: 35 % (ref 24–44)
MAGNESIUM SERPL-MCNC: 1.7 MG/DL (ref 1.6–2.6)
MCH RBC QN AUTO: 29.1 PG (ref 26–34)
MCHC RBC AUTO-ENTMCNC: 33.7 G/DL (ref 31–37)
MCV RBC AUTO: 86.4 FL (ref 80–100)
MONOCYTES NFR BLD: 0.8 K/UL (ref 0.1–1.3)
MONOCYTES NFR BLD: 11 % (ref 1–7)
NEUTROPHILS NFR BLD: 52 % (ref 36–66)
NEUTS SEG NFR BLD: 3.7 K/UL (ref 1.3–9.1)
NITRITE UR QL STRIP: NEGATIVE
PH UR STRIP: 6 [PH] (ref 5–8)
PLATELET # BLD AUTO: 239 K/UL (ref 150–450)
PMV BLD AUTO: 7.5 FL (ref 6–12)
POTASSIUM SERPL-SCNC: 3.7 MMOL/L (ref 3.7–5.3)
PROT UR STRIP-MCNC: NEGATIVE MG/DL
RBC # BLD AUTO: 4.27 M/UL (ref 4–5.2)
RBC #/AREA URNS HPF: ABNORMAL /HPF
SODIUM SERPL-SCNC: 137 MMOL/L (ref 135–144)
SP GR UR STRIP: 1.01 (ref 1–1.03)
TSH SERPL DL<=0.05 MIU/L-ACNC: 0.69 UIU/ML (ref 0.3–5)
UROBILINOGEN UR STRIP-ACNC: NORMAL EU/DL (ref 0–1)
WBC #/AREA URNS HPF: ABNORMAL /HPF
WBC OTHER # BLD: 7.2 K/UL (ref 3.5–11)

## 2023-10-18 PROCEDURE — 97161 PT EVAL LOW COMPLEX 20 MIN: CPT

## 2023-10-18 PROCEDURE — 83880 ASSAY OF NATRIURETIC PEPTIDE: CPT

## 2023-10-18 PROCEDURE — 93306 TTE W/DOPPLER COMPLETE: CPT

## 2023-10-18 PROCEDURE — 93010 ELECTROCARDIOGRAM REPORT: CPT | Performed by: INTERNAL MEDICINE

## 2023-10-18 PROCEDURE — 97530 THERAPEUTIC ACTIVITIES: CPT

## 2023-10-18 PROCEDURE — 83735 ASSAY OF MAGNESIUM: CPT

## 2023-10-18 PROCEDURE — 81001 URINALYSIS AUTO W/SCOPE: CPT

## 2023-10-18 PROCEDURE — 36415 COLL VENOUS BLD VENIPUNCTURE: CPT

## 2023-10-18 PROCEDURE — 85025 COMPLETE CBC W/AUTO DIFF WBC: CPT

## 2023-10-18 PROCEDURE — 2580000003 HC RX 258: Performed by: NURSE PRACTITIONER

## 2023-10-18 PROCEDURE — 2060000000 HC ICU INTERMEDIATE R&B

## 2023-10-18 PROCEDURE — 99223 1ST HOSP IP/OBS HIGH 75: CPT | Performed by: INTERNAL MEDICINE

## 2023-10-18 PROCEDURE — 99222 1ST HOSP IP/OBS MODERATE 55: CPT | Performed by: INTERNAL MEDICINE

## 2023-10-18 PROCEDURE — 6370000000 HC RX 637 (ALT 250 FOR IP): Performed by: NURSE PRACTITIONER

## 2023-10-18 PROCEDURE — 84443 ASSAY THYROID STIM HORMONE: CPT

## 2023-10-18 PROCEDURE — 80048 BASIC METABOLIC PNL TOTAL CA: CPT

## 2023-10-18 PROCEDURE — 97165 OT EVAL LOW COMPLEX 30 MIN: CPT

## 2023-10-18 RX ADMIN — OXYCODONE HYDROCHLORIDE AND ACETAMINOPHEN 500 MG: 500 TABLET ORAL at 09:00

## 2023-10-18 RX ADMIN — ATORVASTATIN CALCIUM 20 MG: 20 TABLET, FILM COATED ORAL at 22:08

## 2023-10-18 RX ADMIN — HYDROCHLOROTHIAZIDE 12.5 MG: 25 TABLET ORAL at 09:00

## 2023-10-18 RX ADMIN — FENOFIBRATE 54 MG: 54 TABLET ORAL at 09:00

## 2023-10-18 RX ADMIN — CLOPIDOGREL BISULFATE 75 MG: 75 TABLET ORAL at 09:00

## 2023-10-18 RX ADMIN — CARVEDILOL 12.5 MG: 12.5 TABLET, FILM COATED ORAL at 22:08

## 2023-10-18 RX ADMIN — CARVEDILOL 12.5 MG: 12.5 TABLET, FILM COATED ORAL at 09:00

## 2023-10-18 RX ADMIN — SODIUM CHLORIDE, PRESERVATIVE FREE 10 ML: 5 INJECTION INTRAVENOUS at 09:02

## 2023-10-18 RX ADMIN — MULTIPLE VITAMINS W/ MINERALS TAB 1 TABLET: TAB at 09:00

## 2023-10-18 RX ADMIN — SODIUM CHLORIDE: 9 INJECTION, SOLUTION INTRAVENOUS at 22:07

## 2023-10-18 RX ADMIN — SODIUM CHLORIDE, PRESERVATIVE FREE 10 ML: 5 INJECTION INTRAVENOUS at 22:09

## 2023-10-18 RX ADMIN — Medication 1000 UNITS: at 09:00

## 2023-10-18 RX ADMIN — SODIUM CHLORIDE: 9 INJECTION, SOLUTION INTRAVENOUS at 09:13

## 2023-10-18 RX ADMIN — ASPIRIN 81 MG: 81 TABLET, COATED ORAL at 09:00

## 2023-10-18 NOTE — CONSULTS
Cyanosis or Clubbing   Lower extremity edema: No.   Skin: Warm and dry  Neurological:  Alert and oriented. Moves all extremities well  No abnormalities of mood, affect, memory, mentation, or behavior are noted    DATA:    Diagnostics:      EKG:   Sinus bradycardia with 1st degree A-V block Incomplete right bundle branch block Inferior infarct (cited on or before 24-NOV-2014) Anterior infarct (cited on or before 24-NOV-2014) T wave abnormality, consider lateral ischemia    Labs:     CBC:   Recent Labs     10/17/23  1735 10/18/23  0543   WBC 7.9 7.2   HGB 13.1 12.4   HCT 39.2 36.9    239     BMP:   Recent Labs     10/17/23  1735 10/18/23  0543   * 137   K 3.8 3.7   CO2 28 26   BUN 20 17   CREATININE 0.9 0.7   LABGLOM >60 >60   GLUCOSE 102* 105*     BNP: No results for input(s): \"BNP\" in the last 72 hours. PT/INR: No results for input(s): \"PROTIME\", \"INR\" in the last 72 hours. APTT:No results for input(s): \"APTT\" in the last 72 hours. CARDIAC ENZYMES:No results for input(s): \"CKTOTAL\", \"CKMB\", \"CKMBINDEX\", \"TROPONINI\" in the last 72 hours.   FASTING LIPID PANEL:  Lab Results   Component Value Date/Time    HDL 43 11/03/2022 08:20 AM    LDLDIRECT 84 10/17/2017 08:55 AM    TRIG 191 11/03/2022 08:20 AM     LIVER PROFILE:  Recent Labs     10/17/23  1735   AST 24   ALT 21   LABALBU 4.8       Patient Active Problem List   Diagnosis    Hypertension    Hyperlipidemia    Tubulovillous adenoma of colon    Psoriasis    Osteoporosis    Osteoarthritis of hand    Osteoarthritis of lower leg, localized    Failure of total knee arthroplasty (HCC)    S/P total knee replacement    Osteoarthritis of lumbar spine    DJD (degenerative joint disease) of thoracic spine    Thyroid nodule    Hypokalemia    NSTEMI (non-ST elevated myocardial infarction) ([address])    History of colon polyps    Syncope and collapse    CAD (coronary artery disease)    Stented coronary artery    Acute bilateral low back pain without sciatica    SAH

## 2023-10-18 NOTE — FLOWSHEET NOTE
RN paged Cardiology for new consult regarding syncopal episode and EKG changes; Dr. Tereza Rosado aware of new consult. Electronically signed by Jackson Briceño RN.

## 2023-10-18 NOTE — H&P
[facility] Internal Medicine  Bennett Tenorio MD; Lady Lennox MD; Annia Persaud MD; MD Nadia Castaneda MD; MD [facility]   [address]    HISTORY AND PHYSICAL EXAMINATION            Date:   10/18/2023  Patient name:  Jose Eduardo Wilson  Date of admission:  10/17/2023  3:48 PM  MRN:   [id number]  Account:  [de-identified]  YOB: 1945  PCP:    Patrice Epperson MD  Room:   2098/2098-01  Code Status:    Full Code    Chief Complaint:     Chief Complaint   Patient presents with    Dizziness     Episode of dizziness and lightheadedness; lowered herself to the ground       History Obtained From:     Patient/EMR/Bedside RN    History of Present Illness:     Jose Eduardo Wilson is a 78 y.o. Non- / non  female who presents with Dizziness (Episode of dizziness and lightheadedness; lowered herself to the ground)   and is admitted to the hospital for the management of Syncope and collapse. Medical history significant for HTN, HLD, CABG s/p coronary stent, NSTEMI and osteoporosis. she was taking a nap in her recliner chair this afternoon. When she woke up she immediately felt dizzy and diaphoretic before moving. Her cell phone was not near her to call for help. When she got up to get her cell phone she states that she felt like she was going to blackout and noticed black spots in her vision   She managed to make it back to her chair then her vision completely blacking out as she was going to sit down resulting in her actually sitting on the floor next to her chair. She reports there was a brief period of time that she does not recall. .    She denies any history of similar episodes but states that she has felt dizziness intermittently in the past    Denies nausea or vomiting. Denies change in appetite. Denies pain or burning with urination.   EKG reveals sinus bradycardia with no T wave

## 2023-10-18 NOTE — PLAN OF CARE
Problem: Discharge Planning  Goal: Discharge to home or other facility with appropriate resources  10/18/2023 1558 by Jaylen Loco RN  Outcome: Progressing  Flowsheets (Taken 10/18/2023 1558)  Discharge to home or other facility with appropriate resources:   Identify barriers to discharge with patient and caregiver   Arrange for needed discharge resources and transportation as appropriate   Refer to discharge planning if patient needs post-hospital services based on physician order or complex needs related to functional status, cognitive ability or social support system  Note: Patient up to chair with PT and OT this shift; Patient ambulating well with standby assist in room. Case management following for additional discharge needs. Problem: Safety - Adult  Goal: Free from fall injury  10/18/2023 1558 by Jaylen Loco RN  Outcome: Progressing  Flowsheets (Taken 10/18/2023 1558)  Free From Fall Injury:   Raysa Otero family/caregiver on patient safety   Based on caregiver fall risk screen, instruct family/caregiver to ask for assistance with transferring infant if caregiver noted to have fall risk factors  Note: Patient remains free from falls during this shift. Bed in lowest position, side rails up x2, call light/personal belongings/side table within reach. Patient calls out appropriately with call light for assistance with ambulation.        Problem: ABCDS Injury Assessment  Goal: Absence of physical injury  10/18/2023 1558 by Jaylen Loco RN  Outcome: Progressing  Flowsheets (Taken 10/18/2023 1558)  Absence of Physical Injury: Implement safety measures based on patient assessment

## 2023-10-19 VITALS
DIASTOLIC BLOOD PRESSURE: 65 MMHG | HEART RATE: 65 BPM | HEIGHT: 61 IN | BODY MASS INDEX: 27.94 KG/M2 | SYSTOLIC BLOOD PRESSURE: 132 MMHG | WEIGHT: 148 LBS | OXYGEN SATURATION: 96 % | RESPIRATION RATE: 16 BRPM | TEMPERATURE: 98 F

## 2023-10-19 LAB
ANION GAP SERPL CALCULATED.3IONS-SCNC: 9 MMOL/L (ref 9–17)
BASOPHILS # BLD: 0 K/UL (ref 0–0.2)
BASOPHILS NFR BLD: 0 % (ref 0–2)
BUN SERPL-MCNC: 15 MG/DL (ref 8–23)
CALCIUM SERPL-MCNC: 9.2 MG/DL (ref 8.6–10.4)
CHLORIDE SERPL-SCNC: 104 MMOL/L (ref 98–107)
CO2 SERPL-SCNC: 27 MMOL/L (ref 20–31)
CREAT SERPL-MCNC: 0.8 MG/DL (ref 0.5–0.9)
ECHO AO ROOT DIAM: 3.1 CM
ECHO AO ROOT INDEX: 1.87 CM/M2
ECHO AV AREA PEAK VELOCITY: 1.9 CM2
ECHO AV AREA VTI: 1.9 CM2
ECHO AV AREA/BSA PEAK VELOCITY: 1.1 CM2/M2
ECHO AV AREA/BSA VTI: 1.1 CM2/M2
ECHO AV MEAN GRADIENT: 4 MMHG
ECHO AV MEAN VELOCITY: 1 M/S
ECHO AV PEAK GRADIENT: 8 MMHG
ECHO AV PEAK VELOCITY: 1.4 M/S
ECHO AV VELOCITY RATIO: 0.71
ECHO AV VTI: 31.4 CM
ECHO BSA: 1.7 M2
ECHO EST RA PRESSURE: 3 MMHG
ECHO LA AREA 2C: 17.8 CM2
ECHO LA AREA 4C: 15.6 CM2
ECHO LA DIAMETER INDEX: 2.17 CM/M2
ECHO LA DIAMETER: 3.6 CM
ECHO LA MAJOR AXIS: 4.8 CM
ECHO LA MINOR AXIS: 5.2 CM
ECHO LA TO AORTIC ROOT RATIO: 1.16
ECHO LA VOL 2C: 48 ML (ref 22–52)
ECHO LA VOL 4C: 42 ML (ref 22–52)
ECHO LA VOL BP: 47 ML (ref 22–52)
ECHO LA VOL/BSA BIPLANE: 28 ML/M2 (ref 16–34)
ECHO LA VOLUME INDEX A2C: 29 ML/M2 (ref 16–34)
ECHO LA VOLUME INDEX A4C: 25 ML/M2 (ref 16–34)
ECHO LV E' LATERAL VELOCITY: 8 CM/S
ECHO LV E' SEPTAL VELOCITY: 4 CM/S
ECHO LV FRACTIONAL SHORTENING: 25 % (ref 28–44)
ECHO LV INTERNAL DIMENSION DIASTOLE INDEX: 2.65 CM/M2
ECHO LV INTERNAL DIMENSION DIASTOLIC: 4.4 CM (ref 3.9–5.3)
ECHO LV INTERNAL DIMENSION SYSTOLIC INDEX: 1.99 CM/M2
ECHO LV INTERNAL DIMENSION SYSTOLIC: 3.3 CM
ECHO LV IVSD: 1 CM (ref 0.6–0.9)
ECHO LV MASS 2D: 147.8 G (ref 67–162)
ECHO LV MASS INDEX 2D: 89.1 G/M2 (ref 43–95)
ECHO LV POSTERIOR WALL DIASTOLIC: 1 CM (ref 0.6–0.9)
ECHO LV RELATIVE WALL THICKNESS RATIO: 0.45
ECHO LVOT AREA: 2.5 CM2
ECHO LVOT AV VTI INDEX: 0.73
ECHO LVOT DIAM: 1.8 CM
ECHO LVOT MEAN GRADIENT: 2 MMHG
ECHO LVOT PEAK GRADIENT: 4 MMHG
ECHO LVOT PEAK VELOCITY: 1 M/S
ECHO LVOT STROKE VOLUME INDEX: 35.1 ML/M2
ECHO LVOT SV: 58.2 ML
ECHO LVOT VTI: 22.9 CM
ECHO MV A VELOCITY: 0.92 M/S
ECHO MV AREA VTI: 1.7 CM2
ECHO MV E DECELERATION TIME (DT): 320 MS
ECHO MV E VELOCITY: 0.59 M/S
ECHO MV E/A RATIO: 0.64
ECHO MV E/E' LATERAL: 7.38
ECHO MV E/E' RATIO (AVERAGED): 11.06
ECHO MV E/E' SEPTAL: 14.75
ECHO MV LVOT VTI INDEX: 1.45
ECHO MV MAX VELOCITY: 1.1 M/S
ECHO MV MEAN GRADIENT: 1 MMHG
ECHO MV MEAN VELOCITY: 0.5 M/S
ECHO MV PEAK GRADIENT: 5 MMHG
ECHO MV VTI: 33.3 CM
ECHO RA AREA 4C: 12 CM2
ECHO RA END SYSTOLIC VOLUME APICAL 4 CHAMBER INDEX BSA: 14 ML/M2
ECHO RA VOLUME: 24 ML
ECHO RIGHT VENTRICULAR SYSTOLIC PRESSURE (RVSP): 12 MMHG
ECHO RV TAPSE: 1.2 CM (ref 1.7–?)
ECHO TV REGURGITANT MAX VELOCITY: 1.47 M/S
ECHO TV REGURGITANT PEAK GRADIENT: 9 MMHG
EOSINOPHIL # BLD: 0.2 K/UL (ref 0–0.4)
EOSINOPHILS RELATIVE PERCENT: 2 % (ref 0–4)
ERYTHROCYTE [DISTWIDTH] IN BLOOD BY AUTOMATED COUNT: 13.7 % (ref 11.5–14.9)
GFR SERPL CREATININE-BSD FRML MDRD: >60 ML/MIN/1.73M2
GLUCOSE SERPL-MCNC: 98 MG/DL (ref 70–99)
HCT VFR BLD AUTO: 36.6 % (ref 36–46)
HGB BLD-MCNC: 12.4 G/DL (ref 12–16)
LYMPHOCYTES NFR BLD: 2.3 K/UL (ref 1–4.8)
LYMPHOCYTES RELATIVE PERCENT: 36 % (ref 24–44)
MCH RBC QN AUTO: 29.4 PG (ref 26–34)
MCHC RBC AUTO-ENTMCNC: 33.9 G/DL (ref 31–37)
MCV RBC AUTO: 86.8 FL (ref 80–100)
MONOCYTES NFR BLD: 0.7 K/UL (ref 0.1–1.3)
MONOCYTES NFR BLD: 12 % (ref 1–7)
NEUTROPHILS NFR BLD: 50 % (ref 36–66)
NEUTS SEG NFR BLD: 3.2 K/UL (ref 1.3–9.1)
PLATELET # BLD AUTO: 220 K/UL (ref 150–450)
PMV BLD AUTO: 7.8 FL (ref 6–12)
POTASSIUM SERPL-SCNC: 4 MMOL/L (ref 3.7–5.3)
RBC # BLD AUTO: 4.21 M/UL (ref 4–5.2)
SODIUM SERPL-SCNC: 140 MMOL/L (ref 135–144)
WBC OTHER # BLD: 6.4 K/UL (ref 3.5–11)

## 2023-10-19 PROCEDURE — 85025 COMPLETE CBC W/AUTO DIFF WBC: CPT

## 2023-10-19 PROCEDURE — 80048 BASIC METABOLIC PNL TOTAL CA: CPT

## 2023-10-19 PROCEDURE — 99233 SBSQ HOSP IP/OBS HIGH 50: CPT | Performed by: SURGERY

## 2023-10-19 PROCEDURE — 36415 COLL VENOUS BLD VENIPUNCTURE: CPT

## 2023-10-19 PROCEDURE — 6370000000 HC RX 637 (ALT 250 FOR IP): Performed by: NURSE PRACTITIONER

## 2023-10-19 RX ORDER — CARVEDILOL 3.12 MG/1
3.12 TABLET ORAL 2 TIMES DAILY
Qty: 60 TABLET | Refills: 3 | Status: SHIPPED | OUTPATIENT
Start: 2023-10-19

## 2023-10-19 RX ORDER — CARVEDILOL 3.12 MG/1
3.12 TABLET ORAL 2 TIMES DAILY
Status: DISCONTINUED | OUTPATIENT
Start: 2023-10-19 | End: 2023-10-19 | Stop reason: HOSPADM

## 2023-10-19 RX ORDER — MELOXICAM 15 MG/1
15 TABLET ORAL DAILY PRN
Qty: 30 TABLET | Refills: 0 | Status: SHIPPED | OUTPATIENT
Start: 2023-10-19

## 2023-10-19 RX ORDER — TIZANIDINE 4 MG/1
4 TABLET ORAL EVERY 8 HOURS PRN
Qty: 30 TABLET | Refills: 0 | Status: SHIPPED | OUTPATIENT
Start: 2023-10-19

## 2023-10-19 RX ADMIN — MULTIPLE VITAMINS W/ MINERALS TAB 1 TABLET: TAB at 09:29

## 2023-10-19 RX ADMIN — FENOFIBRATE 54 MG: 54 TABLET ORAL at 09:29

## 2023-10-19 RX ADMIN — OXYCODONE HYDROCHLORIDE AND ACETAMINOPHEN 500 MG: 500 TABLET ORAL at 09:28

## 2023-10-19 RX ADMIN — HYDROCHLOROTHIAZIDE 12.5 MG: 25 TABLET ORAL at 09:28

## 2023-10-19 RX ADMIN — ASPIRIN 81 MG: 81 TABLET, COATED ORAL at 09:29

## 2023-10-19 RX ADMIN — CLOPIDOGREL BISULFATE 75 MG: 75 TABLET ORAL at 09:29

## 2023-10-19 RX ADMIN — Medication 1000 UNITS: at 09:29

## 2023-10-19 RX ADMIN — CARVEDILOL 12.5 MG: 12.5 TABLET, FILM COATED ORAL at 09:29

## 2023-10-19 NOTE — CARE COORDINATION
Case Management Assessment  Initial Evaluation    Date/Time of Evaluation: 10/19/2023 12:49 PM  Assessment Completed by: Corey Singletary RN    If patient is discharged prior to next notation, then this note serves as note for discharge by case management. Patient Name: Poly Agudelo                   YOB: 1945  Diagnosis: Syncope and collapse [R55]  Syncope, unspecified syncope type [R55]  Electrocardiogram showing T wave abnormalities [R94.31]                   Date / Time: 10/17/2023  3:48 PM    Patient Admission Status: Inpatient   Readmission Risk (Low < 19, Mod (19-27), High > 27): Readmission Risk Score: 6.8    Current PCP: Pari Taylor MD  PCP verified by CM? Yes    Chart Reviewed: Yes      History Provided by: Patient  Patient Orientation: Alert and Oriented    Patient Cognition: Alert    Hospitalization in the last 30 days (Readmission):  No    If yes, Readmission Assessment in CM Navigator will be completed. Advance Directives:      Code Status: Full Code   Patient's Primary Decision Maker is: Legal Next of Kin    [facility]  Child - [phone]    Discharge Planning:    Patient lives with: Alone Type of Home: House  Primary Care Giver: Self  Patient Support Systems include: Children   Current Financial resources: Medicare  Current community resources: None  Current services prior to admission: None            Current DME:              Type of Home Care services:  None    ADLS  Prior functional level: Independent in ADLs/IADLs  Current functional level: Independent in ADLs/IADLs    PT AM-PAC:   /24  OT AM-PAC: 24 /24    Family can provide assistance at DC: Yes  Would you like Case Management to discuss the discharge plan with any other family members/significant others, and if so, who?  Yes  Plans to Return to Present Housing: Yes  Other Identified Issues/Barriers to RETURNING to current housing: no  Potential Assistance needed at discharge: N/A

## 2023-10-19 NOTE — DISCHARGE SUMMARY
10:35 AM    CALCIUM 9.2 10/19/2023 05:20 AM    LABGLOM >60 10/19/2023 05:20 AM    GFRAA >60 09/20/2021 10:35 AM    GFR      09/20/2021 10:35 AM    GFR NOT REPORTED 09/20/2021 10:35 AM     HFP:    Lab Results   Component Value Date/Time    PROT 7.2 10/17/2023 05:35 PM     CMP:    Lab Results   Component Value Date/Time    GLUCOSE 98 10/19/2023 05:20 AM    GLUCOSE 95 03/06/2012 08:48 AM     10/19/2023 05:20 AM    K 4.0 10/19/2023 05:20 AM     10/19/2023 05:20 AM    CO2 27 10/19/2023 05:20 AM    BUN 15 10/19/2023 05:20 AM    CREATININE 0.8 10/19/2023 05:20 AM    ANIONGAP 9 10/19/2023 05:20 AM    ALKPHOS 51 10/17/2023 05:35 PM    ALT 21 10/17/2023 05:35 PM    AST 24 10/17/2023 05:35 PM    BILITOT 0.3 10/17/2023 05:35 PM    LABALBU 4.8 10/17/2023 05:35 PM    LABALBU 4.7 03/06/2012 08:48 AM    ALBUMIN 1.5 11/03/2022 08:20 AM    LABGLOM >60 10/19/2023 05:20 AM    GFRAA >60 09/20/2021 10:35 AM    GFR      09/20/2021 10:35 AM    GFR NOT REPORTED 09/20/2021 10:35 AM    PROT 7.2 10/17/2023 05:35 PM    CALCIUM 9.2 10/19/2023 05:20 AM     PT/INR:    Lab Results   Component Value Date/Time    PROTIME 12.6 12/23/2020 04:15 PM    INR 1.0 12/23/2020 04:15 PM        Radiology:  CT HEAD WO CONTRAST    Result Date: 10/17/2023  Age-related changes the brain findings likely related to microvascular ischemic disease. No acute intracranial process identified. Spondylosis of the cervical spine. No fracture. CT CERVICAL SPINE WO CONTRAST    Result Date: 10/17/2023  Age-related changes the brain findings likely related to microvascular ischemic disease. No acute intracranial process identified. Spondylosis of the cervical spine. No fracture. XR CHEST PORTABLE    Result Date: 10/17/2023  No acute cardiopulmonary disease identified.        Consultations:    Consults:     Final Specialist Recommendations/Findings:   IP CONSULT TO INTERNAL MEDICINE  IP CONSULT TO CARDIOLOGY      The patient was seen and examined on day of

## 2023-10-19 NOTE — ACP (ADVANCE CARE PLANNING)
Advance Care Planning     Advance Care Planning Activator (Inpatient)  Conversation Note      Date of ACP Conversation: 10/19/2023     Conversation Conducted with: Patient with Decision Making Capacity    ACP Activator: Naina Ochoa RN        Health Care Decision Maker:     Current Designated Health Care Decision Maker:     Primary Decision Maker: Wendy Oh - Child - [phone]  Click here to complete Healthcare Decision Makers including section of the Healthcare Decision Maker Relationship (ie \"Primary\")      Care Preferences    Ventilation: \"If you were in your present state of health and suddenly became very ill and were unable to breathe on your own, what would your preference be about the use of a ventilator (breathing machine) if it were available to you? \"      Would the patient desire the use of ventilator (breathing machine)?: yes    \"If your health worsens and it becomes clear that your chance of recovery is unlikely, what would your preference be about the use of a ventilator (breathing machine) if it were available to you? \"     Would the patient desire the use of ventilator (breathing machine)?: Yes      Resuscitation  \"CPR works best to restart the heart when there is a sudden event, like a heart attack, in someone who is otherwise healthy. Unfortunately, CPR does not typically restart the heart for people who have serious health conditions or who are very sick. \"    \"In the event your heart stopped as a result of an underlying serious health condition, would you want attempts to be made to restart your heart (answer \"yes\" for attempt to resuscitate) or would you prefer a natural death (answer \"no\" for do not attempt to resuscitate)? \" no       [] Yes   [] No   Educated Patient / Valdemar Liu regarding differences between Advance Directives and portable DNR orders.     Length of ACP Conversation in minutes:      Conversation Outcomes:  ACP discussion completed    Follow-up plan:    []

## 2023-10-19 NOTE — FLOWSHEET NOTE
RN notified that Holter monitor is not available for patient today; RN notified Cardiology, who confirmed that patient is okay to discharge with outpatient Holter monitor order. RN to notify Attending. 10/19/23 3977   Treatment Team Notification   Reason for Communication Review case   Name of Team Member Notified DIVYA Red CNP   Treatment Team Role Advanced Practice Nurse  (Cardiology)   Method of Communication Secure Message   Notification Time [phone]       Electronically signed by Lexa Armstrong RN.

## 2023-10-20 ENCOUNTER — CARE COORDINATION (OUTPATIENT)
Dept: CASE MANAGEMENT | Age: 78
End: 2023-10-20

## 2023-10-20 DIAGNOSIS — R42 DIZZINESS: Primary | ICD-10-CM

## 2023-10-20 DIAGNOSIS — R55 SYNCOPE, UNSPECIFIED SYNCOPE TYPE: ICD-10-CM

## 2023-10-20 PROCEDURE — 1111F DSCHRG MED/CURRENT MED MERGE: CPT | Performed by: INTERNAL MEDICINE

## 2023-10-20 NOTE — PROGRESS NOTES
10/18/23 1601   Encounter Summary   Encounter Overview/Reason  Spiritual/Emotional Needs   Service Provided For: Patient   Referral/Consult From: Claire   Last Encounter  10/18/23   Complexity of Encounter Moderate   Spiritual/Emotional needs   Type Spiritual Support   Assessment/Intervention/Outcome   Assessment Peaceful;Calm;Coping   Intervention Active listening;Prayer (assurance of)/[place];Sustaining Presence/Ministry of presence; Explored/Affirmed feelings, thoughts, concerns   Outcome Acceptance; Coping;Engaged in conversation;Receptive
[facility]   Occupational Therapy Evaluation  Date: 10/18/23  Patient Name: Angela Sandhu       Room: 9116/8245-46  MRN: [id number]  Account: [de-identified]   : 1945  (78 y.o.) Gender: female     Discharge Recommendations: The patient may need non-skilled ADL assistance after discharge. OT Equipment Recommendations  Equipment Needed: No    Referring Practitioner: Caleb Lam MD  Diagnosis: Syncope and collapse Additional Pertinent Hx: Angela Sandhu is a 78 y.o. Non- / non  female who presents with Dizziness (Episode of dizziness and lightheadedness; lowered herself to the ground)   and is admitted to the hospital for the management of Syncope and collapse. Medical history significant for HTN, HLD, CABG s/p coronary stent, NSTEMI and osteoporosis. According to patient, she was taking a nap in her recliner chair this afternoon. When she woke up she immediately felt dizzy and diaphoretic before moving. Her cell phone was not near her to call for help. When she got up to get her cell phone she states that she felt like she was going to blackout and noticed black spots in her vision. She managed to make it back to her chair then her vision completely blacking out as she was going to sit down resulting in her actually sitting on the floor next to her chair. She reports there was a brief period of time that she does not recall. Luckily she was able to contact her daughter who was able to make it to her home within 15 minutes and brought her to the ER for further evaluation. She denies any history of similar episodes but states that she has felt dizziness intermittently in the past.  Denies nausea or vomiting. Denies change in appetite. Denies pain or burning with urination. EKG reveals sinus bradycardia with no T wave inversions in lateral leads not present on EKG in 2020. Troponin 15 with repeat 13.   Denies any increase shortness of
CLARI ANDERSON [facility] Internal Medicine  Viral Paula MD; Indu Carlson MD; Lida Moe MD; MD Song Paige MD; MD JOCELYN Martel [facility]   [address] Note            Date:   10/19/2023  Patient name:  Angela Sandhu  Date of admission:  10/17/2023  3:48 PM  MRN:   [id number]  Account:  [de-identified]  YOB: 1945  PCP:    Nabeel Toribio MD  Room:   2098/2098-01  Code Status:    Full Code    Chief Complaint:     Chief Complaint   Patient presents with    Dizziness     Episode of dizziness and lightheadedness; lowered herself to the ground       History Obtained From:     Patient/EMR/Bedside RN    History of Present Illness:     Angela Sandhu is a 78 y.o. Non- / non  female who presents with Dizziness (Episode of dizziness and lightheadedness; lowered herself to the ground)   and is admitted to the hospital for the management of Syncope and collapse. Medical history significant for HTN, HLD, CABG s/p coronary stent, NSTEMI and osteoporosis. she was taking a nap in her recliner chair this afternoon. When she woke up she immediately felt dizzy and diaphoretic before moving. Her cell phone was not near her to call for help. When she got up to get her cell phone she states that she felt like she was going to blackout and noticed black spots in her vision   She managed to make it back to her chair then her vision completely blacking out as she was going to sit down resulting in her actually sitting on the floor next to her chair. She reports there was a brief period of time that she does not recall. .    She denies any history of similar episodes but states that she has felt dizziness intermittently in the past    Denies nausea or vomiting. Denies change in appetite. Denies pain or burning with urination.   EKG reveals sinus bradycardia with no T wave inversions in lateral
CLINICAL PHARMACY NOTE: MEDS TO BEDS    Total # of Prescriptions Filled: 1   The following medications were delivered to the patient:  Carvedilol 3.125    Additional Documentation:  Delivered medications to patients room
Cardiology put on patient list for consult.
Discharge instructions discussed with patient and family at bedside; Recent plan of care during stay, most recent vitals, medication changes and follow-up appointments discussed. All questions and concerns addressed at this time. Patient discharging home; Ambulated out of building independently with granddaughter. Electronically signed by Elisha Meza RN.
[facility]  Progress Note                   Date:   10/19/2023  Patient name: Marita Caldwell  Date of admission:  10/17/2023  3:48 PM  MRN:   [id number]  YOB: 1945  PCP: Marcela Baxter MD    Reason for Admission: Syncope and collapse [R55]  Syncope, unspecified syncope type [R55]  Electrocardiogram showing T wave abnormalities [R94.31]    Subjective:       Clinical Changes /Abnormalities:Patient seen and examined. Denies chest pain or shortness of breath. Tele/vitals/labs reviewed . Review of Systems    Medications:   Scheduled Meds:   ascorbic acid  500 mg Oral Daily    aspirin  81 mg Oral Daily    atorvastatin  20 mg Oral Nightly    carvedilol  12.5 mg Oral BID    clopidogrel  75 mg Oral Daily    fenofibrate  54 mg Oral Daily    hydroCHLOROthiazide  12.5 mg Oral Daily    therapeutic multivitamin-minerals  1 tablet Oral Daily    Vitamin D  1,000 Units Oral Daily    sodium chloride flush  5-40 mL IntraVENous 2 times per day    enoxaparin  40 mg SubCUTAneous Daily     Continuous Infusions:   sodium chloride      sodium chloride 75 mL/hr at 10/18/23 2207     CBC:   Recent Labs     10/17/23  1735 10/18/23  0543 10/19/23  0520   WBC 7.9 7.2 6.4   HGB 13.1 12.4 12.4    239 220     BMP:    Recent Labs     10/17/23  1735 10/18/23  0543 10/19/23  0520   * 137 140   K 3.8 3.7 4.0   CL 94* 99 104   CO2 28 26 27   BUN 20 17 15   CREATININE 0.9 0.7 0.8   GLUCOSE 102* 105* 98     Hepatic:  Recent Labs     10/17/23  1735   AST 24   ALT 21   BILITOT 0.3   ALKPHOS 51     Troponin:   Recent Labs     10/17/23  1735 10/17/23  1845   TROPHS 15* 13     BNP: No results for input(s): \"BNP\" in the last 72 hours. Lipids: No results for input(s): \"CHOL\", \"HDL\" in the last 72 hours. Invalid input(s): \"LDLCALCU\"  INR: No results for input(s): \"INR\" in the last 72 hours.   DATA:    Diagnostics:       EKG:   Sinus bradycardia with 1st degree A-V block Incomplete right bundle branch block
Pharmacy Medication History Note      List of current medications patient is taking is complete. Source of information: patient, dispense report, OARRS     Changes made to medication list:  Medications flagged for removal (include reason, ex. noncompliance):  None     Medications removed (include reason, ex. therapy complete or physician discontinued):  None     Medications added/doses adjusted:  None     Other notes (ex. Recent course of antibiotics, Coumadin dosing):  Per OARRS, the patient received tramadol 50 mg with a quantity of 20 for 5 days on 9/30/23. Denies use of other OTC or herbal medications.       Allergies clarified    Medication list provided to the patient: no   Medication education provided to the patient: none      Electronically signed by Jeffrey Selby [facility] on 10/17/2023 at 8:01 PM
Physical Therapy  Facility/Department: [facility] PROGRESSIVE CARE  Physical Therapy Initial Assessment    Name: Nathalia Zhou  : 1945  MRN: [id number]  Date of Service: 10/18/2023    Discharge Recommendations: Other (comment) (pt may require non-skilled A upon DC)          Patient Diagnosis(es): The primary encounter diagnosis was Syncope, unspecified syncope type. A diagnosis of Electrocardiogram showing T wave abnormalities was also pertinent to this visit. Past Medical History:  has a past medical history of Diverticulosis of colon, Full dentures, History of colon polyps, Hyperlipidemia, Hypertension, Osteoarthritis, Osteoarthritis of hand, Osteoarthritis of lower leg, localized, Osteoporosis, Psoriasis, Sinus congestion, Tubulovillous adenoma of colon, and Wears glasses. Past Surgical History:  has a past surgical history that includes Colonoscopy (2013); Colonoscopy (10/25/2013); joint replacement (Bilateral); Hysterectomy; skin biopsy; Knee Arthroplasty (Right, 14); Coronary artery bypass graft (12/22/15); pr colsc flx w/removal lesion by hot bx forceps (N/A, 2017); Colonoscopy (2017); Cardiac catheterization; and Colonoscopy (N/A, 2018). Assessment   Assessment: Pt demos independence with mobility at this time without use of device. pt is negative for orthostatic hypotension and performs all mobility while remaining asymptomatic. PT services to be disoncontinued at this time.   Decision Making: Low Complexity  Exam: ROM, MMT, Balance, and functional mobility assessments  Requires PT Follow-Up: No  Activity Tolerance  Activity Tolerance: Patient tolerated evaluation without incident     Plan   Physcial Therapy Plan  General Plan: Discharge with evaluation only  Safety Devices  Type of Devices: Call light within reach, Gait belt, Patient at risk for falls, Left in chair, Nurse notified  Restraints  Restraints Initially in Place: No
Physician Progress Note      PATIENT:               Rea Grace  CSN #:                  [id number]  :                       1945  ADMIT DATE:       10/17/2023 3:48 PM  [facility] DATE:        10/19/2023 4:10 PM  RESPONDING  PROVIDER #:        Kimberly George MD          QUERY TEXT:    Patient admitted with syncope and collapse. 10/18 Cardiology note states,   \"Syncope likely orthostatic hypotension. \"  If possible, please document in   progress notes and discharge summary after study the etiology of the syncope: The medical record reflects the following:  Risk Factors: orthostatic hypotension, advanced age, Zanaflex on home med   list, hydrodiuril, Coreg  Clinical Indicators: admitted with syncope and collapse  Treatment: IVF, Cardiology consult, orthostatic VS, labs, imaging, tele, neuro   checks, holter monitor    Thank you! Natalie Porter RN, BSN, RHIT, CCDS  Clinical   Options provided:  -- Syncope due to orthostatic hypotension secondary to dehydration  -- Syncope due to orthostatic hypotension secondary to autonomic neuropathy  -- Syncope due to bradycardia  -- Other - I will add my own diagnosis  -- Disagree - Not applicable / Not valid  -- Disagree - Clinically unable to determine / Unknown  -- Refer to Clinical Documentation Reviewer    PROVIDER RESPONSE TEXT:    The syncope is due to orthostatic hypotension secondary to dehydration.     Query created by: Natalie Porter on 10/19/2023 11:47 AM      Electronically signed by:  Kimberly George MD 10/19/2023 5:30 PM
Spoke with DOMINIC Eugene about 48 hr Holter Monitor. Informed that currently Cardiology has no available Holter Monitors, pt may need to be sent home with an out patient order. Please call Cardiology Dept shortly prior to discharge to see if any Monitors have been returned and had enough time to receive a full charge.
medications    Medication Sig Start Date End Date Taking? Authorizing Provider   aspirin 81 MG EC tablet Take 1 tablet by mouth daily   Yes Jennifer Collins MD   tiZANidine (ZANAFLEX) 4 MG tablet Take 1 tablet by mouth 3 times daily  Patient taking differently: Take 1 tablet by mouth every 8 hours as needed (muscle pain) 9/29/23  Yes Caitlin Christopher MD   meloxicam (MOBIC) 15 MG tablet Take 1 tablet by mouth daily  Patient taking differently: Take 1 tablet by mouth daily as needed 9/29/23  Yes Caitlin Christopher MD   carvedilol (COREG) 12.5 MG tablet TAKE ONE TABLET BY MOUTH TWICE A DAY 6/28/23  Yes Maru Rivera MD   clopidogrel (PLAVIX) 75 MG tablet TAKE ONE TABLET BY MOUTH DAILY 6/28/23  Yes Maru Rivera MD   fenofibrate (TRICOR) 48 MG tablet Take 1 tablet by mouth daily 5/5/23  Yes Maru Rivera MD   atorvastatin (LIPITOR) 20 MG tablet Take 1 tablet by mouth daily  Patient taking differently: Take 1 tablet by mouth at bedtime 5/4/23  Yes Maru Rivera MD   hydrochlorothiazide (HYDRODIURIL) 12.5 MG tablet Take 1 tablet by mouth daily   Yes Jennifer Collins MD   Vitamin D (CHOLECALCIFEROL) 1000 UNITS CAPS capsule Take 1 capsule by mouth daily   Yes Jennifer Collins MD   Calcium Carbonate-Vitamin D (CALCIUM + D PO) Take 500 mg by mouth daily. Yes Jennifer Collins MD   ascorbic acid (VITAMIN C) 500 MG tablet Take 1 tablet by mouth daily   Yes Jennifer Collins MD   nitroGLYCERIN (NITROSTAT) 0.4 MG SL tablet Place 1 tablet under the tongue every 5 minutes as needed for Chest pain up to max of 3 total doses. If no relief after 1 dose, call 911. 7/3/18   Fredy Hogan MD   Multiple Vitamins-Minerals (THERAPEUTIC MULTIVITAMIN-MINERALS) tablet Take 1 tablet by mouth daily    Jennifer Collins MD   acetaminophen (TYLENOL) 325 MG tablet Take 2 tablets by mouth every 6 hours as needed    Jennifer Collins MD        Allergies:     Patient has no known allergies.     Social History:

## 2023-10-20 NOTE — CARE COORDINATION
Care Transitions Initial Follow Up Call    Call within 2 business days of discharge: Yes    Patient Current Location:  Home: [address]  [address] [place] [address] [address] contacted the patient by telephone to perform post hospital discharge assessment. Verified name and  with patient as identifiers. Provided introduction to self, and explanation of the LPN Care Coordinator role. Patient: Mauro Mills Patient : 1945   MRN: [id number]  Reason for Admission: Dizziness   Discharge Date: 10/19/23 RARS: Readmission Risk Score: 6.5      Last Discharge Facility       Date Complaint Diagnosis Description Type Department Provider    10/17/23 Dizziness Syncope, unspecified syncope type . .. ED to Hosp-Admission (Discharged) (ADMITTED) DONNY Morin MD; Melony Medellin. .. Was this an external facility discharge? No Discharge Facility: [facility] to be reviewed by the provider   Additional needs identified to be addressed with provider: No  none               Method of communication with provider: none. Transitions of Care Initial Call:  Explained the role of Care Transition Nurse and the Transition program, patient is agreeable to follow up calls [facility] to Kimberly Mar today. She reports she doing fine. She has not had any further episodes of dizziness or lightheadedness since leaving the hospital. She denies CP palpitations  sob nausea vomiting or diarrhea. She has had breakfast .Discussed heart healthy diet and avoiding high sodium foods. Making sure she stays hydrated moving slowly when changing positions. Patient does not have support hose advised purchasing some and elevating legs. Voiding okay and having BM. She reports that she will  Holter monitor today. She was told at the hospital it is okay to keep her already scheduled appointment with her cardiologist  with Dr. Danita Perrin .     Writer scheduled HFU 10/23 she will

## 2023-10-23 ENCOUNTER — OFFICE VISIT (OUTPATIENT)
Dept: INTERNAL MEDICINE CLINIC | Age: 78
End: 2023-10-23

## 2023-10-23 VITALS
BODY MASS INDEX: 28.05 KG/M2 | WEIGHT: 148.6 LBS | OXYGEN SATURATION: 97 % | SYSTOLIC BLOOD PRESSURE: 136 MMHG | DIASTOLIC BLOOD PRESSURE: 80 MMHG | HEIGHT: 61 IN | HEART RATE: 78 BPM

## 2023-10-23 DIAGNOSIS — I10 ESSENTIAL HYPERTENSION: Primary | ICD-10-CM

## 2023-10-23 DIAGNOSIS — I25.10 CORONARY ARTERY DISEASE INVOLVING NATIVE CORONARY ARTERY OF NATIVE HEART WITHOUT ANGINA PECTORIS: ICD-10-CM

## 2023-10-23 DIAGNOSIS — Z09 HOSPITAL DISCHARGE FOLLOW-UP: ICD-10-CM

## 2023-10-23 DIAGNOSIS — M54.6 ACUTE MIDLINE THORACIC BACK PAIN: ICD-10-CM

## 2023-10-23 RX ORDER — CARVEDILOL 6.25 MG/1
6.25 TABLET ORAL 2 TIMES DAILY
Qty: 180 TABLET | Refills: 0 | Status: SHIPPED | OUTPATIENT
Start: 2023-10-23

## 2023-10-23 NOTE — PROGRESS NOTES
Subjective:      Patient ID: Marni Shields is a 78 y.o. female. HPI    Review of Systems    Objective:   Physical Exam    Assessment / Plan:           Visit Information    Have you changed or started any medications since your last visit including any over-the-counter medicines, vitamins, or herbal medicines? no   Are you having any side effects from any of your medications? -  no  Have you stopped taking any of your medications? Is so, why? -  no    Have you seen any other physician or provider since your last visit? YES  Have you had any other diagnostic tests since your last visit? YES  Have you been seen in the emergency room and/or had an admission to a hospital since we last saw you? YES  Have you had your routine dental cleaning in the past 6 months? no    Have you activated your [employer] account? If not, what are your barriers?  Yes     Patient Care Team:  Winslow Dubin, MD as PCP - General (Internal Medicine)  Winslow Dubin, [facility]  [address] [place] [address] Nieves Escalante MD (Gastroenterology)  Ronda Rasheed MD as Referring Physician (Cardiology)  Gino Horowitz MD as Surgeon (Cardiothoracic Surgery)  Alan Meigs, RN as Care Transitions Nurse    Medical History Review  Past Medical, Family, and Social History reviewed and does contribute to the patient presenting condition    Health Maintenance   Topic Date Due    DTaP/Tdap/Td vaccine (1 - Tdap) Never done    Shingles vaccine (1 of 2) Never done    Pneumococcal 65+ years Vaccine (1 - PCV) Never done    COVID-19 Vaccine (4 - Pfizer series) 01/03/2022    Annual Wellness Visit (AWV)  02/05/2022    Flu vaccine (1) Never done    Lipids  11/03/2023    Depression Screen  04/13/2024    DEXA (modify frequency per FRAX score)  Completed    Hepatitis A vaccine  Aged Out    Hepatitis B vaccine  Aged Out    Hib vaccine  Aged Out    Meningococcal (ACWY) vaccine  Aged Out    Hepatitis C screen  Discontinued      Post-Discharge Transitional Care  Follow

## 2023-10-27 ENCOUNTER — CARE COORDINATION (OUTPATIENT)
Dept: CASE MANAGEMENT | Age: 78
End: 2023-10-27

## 2023-10-27 NOTE — CARE COORDINATION
Care Transitions Outreach Attempt # 1     Call within 2 business days of discharge:    Attempted to reach patient for transitions of care follow up. Unable to reach patient. Left HIPPA compliant  requested a return call. Patient: Devin Bobo Patient : 1945 MRN: [id number]    Last Discharge Facility       Date Complaint Diagnosis Description Type Department Provider    10/17/23 Dizziness Syncope, unspecified syncope type . .. ED to Hosp-Admission (Discharged) (ADMITTED) DONNY Cox MD; Luis Miguel Ferrara. .. Was this an external facility discharge?  No Discharge Facility Name: [facility] Appointments   Date Time Provider [address]  [facility]   10/31/2023  1:00  [address] ECG / MONITOR ROOM DONNY TONY [address] Radiolog   2023 10:15 AM Lani Jensen MD [facility]   2023 10:15 AM Ralf Rubio MD [address] [place] [address]

## 2023-10-31 ENCOUNTER — CARE COORDINATION (OUTPATIENT)
Dept: CASE MANAGEMENT | Age: 78
End: 2023-10-31

## 2023-10-31 ENCOUNTER — HOSPITAL ENCOUNTER (OUTPATIENT)
Age: 78
Discharge: HOME | End: 2023-11-02
Payer: MEDICARE

## 2023-10-31 DIAGNOSIS — R55 SYNCOPE, UNSPECIFIED SYNCOPE TYPE: ICD-10-CM

## 2023-10-31 PROCEDURE — 93225 XTRNL ECG REC<48 HRS REC: CPT

## 2023-10-31 NOTE — CARE COORDINATION
Care Transitions Follow Up Call    Patient Current Location:  Home: [address]  [address] [place] [address] contacted the patient by telephone to follow up after admission on 10/17 . Verified name and  with patient as identifiers. Patient: Do Welch  Patient : 1945   MRN: [id number]  Reason for Admission: Syncope and collapse   Discharge Date: 10/19/23 RARS: Readmission Risk Score: 6.5      Needs to be reviewed by the provider   Additional needs identified to be addressed with provider: No  none             Method of communication with provider: none. Subsequent transitional call. Spoke to Joy today she states she's doing fine today, She denies HA dizziness syncope cp palpitations nausea. She reports She has not had episode that sent her to the hospital.  She is eating and drinking okay. She has no concerns or questions. She had pcp visit on 10/23 Carvedilol was changed to 6.25 mg twice daily. She has   medications. She picked hp her Holter monitor today will wear for 48 hours.  Cardio appointment     Addressed changes since last contact:  medications-carvedilol 6.25   Discussed follow-up appointments. If no appointment was previously scheduled, appointment scheduling offered: Yes. Is follow up appointment scheduled within 7 days of discharge? Yes . Follow Up  Future Appointments   Date Time Provider [address]   2023 10:15 AM Rosemarie Fuentes MD [facility]   2023 10:15 AM John Torres MD [address] Coordinator reviewed discharge instructions, medical action plan, and red flags with patient and discussed any barriers to care and/or understanding of plan of care after discharge. Discussed appropriate site of care based on symptoms and resources available to patient including: PCP  Specialist  Urgent care clinics  When to call Aniceto Reeys.  The patient agrees to contact the PCP office for

## 2023-11-08 ENCOUNTER — CARE COORDINATION (OUTPATIENT)
Dept: CASE MANAGEMENT | Age: 78
End: 2023-11-08

## 2023-11-08 NOTE — CARE COORDINATION
Care Transitions Follow Up Call    Patient Current Location:  Home: [facility] [address]  [address] [place] [address]    Care Transition Nurse contacted the patient by telephone to follow up after admission Verified name and  with patient as identifiers. Patient: Koko Mari    Patient : 1945   MRN: [id number]    Reason for Admission: syncope, orthostatic hypotension  Discharge Date: 10/19/23   RARS: Readmission Risk Score: 6.5      Needs to be reviewed by the provider   Additional needs identified to be addressed with provider: No               Method of communication with provider: none. Patient reports doing well - denies any further lightheadedness or near syncope. Staying hydrated. Reviewed cardio appt - patient is anxiously awaiting results of holter monitor, but results not back yet. Addressed changes since last contact:   had cardio appt  Discussed follow-up appointments. If no appointment was previously scheduled, appointment scheduling offered: Yes. Follow Up  Future Appointments   Date Time Provider [address]   2023  2:45 PM Nabeel Brennan MD [facility]   2023 10:15 AM Nicole Alexander MD [facility] Transition Nurse reviewed discharge instructions and medical action plan with patient and discussed any barriers to care and/or understanding of plan of care after discharge. Discussed appropriate site of care based on symptoms and resources available to patient including: PCP  Specialist  CTN . The patient agrees to contact the PCP office for questions related to their healthcare.      Patients top risk factors for readmission:  syncope, hypotension  Interventions to address risk factors: Obtained and reviewed discharge summary and/or continuity of care documents - appts PCP + cardio       Care Transitions Subsequent and Final Call    Schedule Follow Up Appointment with PCP: Completed  Subsequent and Final Calls  Do you have any ongoing symptoms?:

## 2023-11-15 ENCOUNTER — CARE COORDINATION (OUTPATIENT)
Dept: CASE MANAGEMENT | Age: 78
End: 2023-11-15

## 2023-11-15 NOTE — CARE COORDINATION
Care Transitions Outreach Attempt #1      Attempted to reach patient for transitions of care follow up. Unable to reach patient. HIPAA compliant message left on  requesting a return call. Patient: Bill Santiago Patient : 1945 MRN: [id number]    Last Discharge Facility       Date Complaint Diagnosis Description Type Department Provider    10/17/23 Dizziness Syncope, unspecified syncope type . .. ED to Hosp-Admission (Discharged) (ADMITTED) DONNY Ovalles MD; Sherly Cadet. .. Was this an external facility discharge?  No Discharge Facility: [address] [facility]    Noted following upcoming appointments from discharge chart review:   [facility] follow up appointment(s):   Future Appointments   Date Time Provider [address]   2023  2:45 Marcy Lui MD [facility]   2023 10:15 AM Sherman Amaral MD [address] [place] [address]

## 2023-11-16 ENCOUNTER — CARE COORDINATION (OUTPATIENT)
Dept: CASE MANAGEMENT | Age: 78
End: 2023-11-16

## 2023-11-16 NOTE — CARE COORDINATION
Care Transitions Outreach Attempt #2    Attempted to reach patient for transitions of care follow up. Unable to reach patient. HIPAA compliant message left on  requesting a return call. Will end care transitions if no return call received, noted that patient was set to end care transitions on 2023. Patient: Elmira Marx Patient : 1945 MRN: [id number]    Last Discharge Facility       Date Complaint Diagnosis Description Type Department Provider    10/17/23 Dizziness Syncope, unspecified syncope type . .. ED to Hosp-Admission (Discharged) (ADMITTED) DONNY Alex MD; Gini Kamara. .. Was this an external facility discharge?  No Discharge Facility: [address] [facility]    Noted following upcoming appointments from discharge chart review:   [facility] follow up appointment(s):   Future Appointments   Date Time Provider [address]   2023  2:45 PM Day Raygoza MD [facility]   2023 10:15 AM Ulises Knox, [address] [facility]  Care Transition Nurse

## 2024-01-04 ENCOUNTER — HOSPITAL ENCOUNTER (OUTPATIENT)
Age: 79
Setting detail: SPECIMEN
Discharge: HOME | End: 2024-01-04

## 2024-01-04 DIAGNOSIS — I25.10 CORONARY ARTERY DISEASE INVOLVING NATIVE CORONARY ARTERY OF NATIVE HEART WITHOUT ANGINA PECTORIS: ICD-10-CM

## 2024-01-05 LAB
CHOLEST SERPL-MCNC: 174 MG/DL
CHOLESTEROL/HDL RATIO: 3.8
HDLC SERPL-MCNC: 46 MG/DL
LDLC SERPL CALC-MCNC: 92 MG/DL (ref 0–130)
TRIGL SERPL-MCNC: 179 MG/DL

## 2024-04-08 ENCOUNTER — OFFICE VISIT (OUTPATIENT)
Dept: INTERNAL MEDICINE CLINIC | Age: 79
End: 2024-04-08
Payer: MEDICARE

## 2024-04-08 VITALS
HEART RATE: 69 BPM | WEIGHT: 153 LBS | HEIGHT: 61 IN | SYSTOLIC BLOOD PRESSURE: 134 MMHG | BODY MASS INDEX: 28.89 KG/M2 | DIASTOLIC BLOOD PRESSURE: 80 MMHG | OXYGEN SATURATION: 99 %

## 2024-04-08 DIAGNOSIS — I10 ESSENTIAL HYPERTENSION: ICD-10-CM

## 2024-04-08 DIAGNOSIS — I25.119 ATHEROSCLEROSIS OF NATIVE CORONARY ARTERY OF NATIVE HEART WITH ANGINA PECTORIS (HCC): ICD-10-CM

## 2024-04-08 DIAGNOSIS — I25.10 CORONARY ARTERY DISEASE INVOLVING NATIVE CORONARY ARTERY OF NATIVE HEART WITHOUT ANGINA PECTORIS: Primary | ICD-10-CM

## 2024-04-08 DIAGNOSIS — I47.29 NSVT (NONSUSTAINED VENTRICULAR TACHYCARDIA) (HCC): ICD-10-CM

## 2024-04-08 PROCEDURE — 3079F DIAST BP 80-89 MM HG: CPT | Performed by: INTERNAL MEDICINE

## 2024-04-08 PROCEDURE — 3075F SYST BP GE 130 - 139MM HG: CPT | Performed by: INTERNAL MEDICINE

## 2024-04-08 PROCEDURE — 1123F ACP DISCUSS/DSCN MKR DOCD: CPT | Performed by: INTERNAL MEDICINE

## 2024-04-08 PROCEDURE — 99214 OFFICE O/P EST MOD 30 MIN: CPT | Performed by: INTERNAL MEDICINE

## 2024-04-08 SDOH — ECONOMIC STABILITY: FOOD INSECURITY: WITHIN THE PAST 12 MONTHS, YOU WORRIED THAT YOUR FOOD WOULD RUN OUT BEFORE YOU GOT MONEY TO BUY MORE.: PATIENT DECLINED

## 2024-04-08 SDOH — ECONOMIC STABILITY: INCOME INSECURITY: HOW HARD IS IT FOR YOU TO PAY FOR THE VERY BASICS LIKE FOOD, HOUSING, MEDICAL CARE, AND HEATING?: PATIENT DECLINED

## 2024-04-08 SDOH — ECONOMIC STABILITY: FOOD INSECURITY: WITHIN THE PAST 12 MONTHS, THE FOOD YOU BOUGHT JUST DIDN'T LAST AND YOU DIDN'T HAVE MONEY TO GET MORE.: PATIENT DECLINED

## 2024-04-08 SDOH — ECONOMIC STABILITY: HOUSING INSECURITY
IN THE LAST 12 MONTHS, WAS THERE A TIME WHEN YOU DID NOT HAVE A STEADY PLACE TO SLEEP OR SLEPT IN A SHELTER (INCLUDING NOW)?: PATIENT DECLINED

## 2024-04-08 ASSESSMENT — ENCOUNTER SYMPTOMS
COLOR CHANGE: 0
ABDOMINAL PAIN: 0
COUGH: 0
DIARRHEA: 0
CHOKING: 0
EYE DISCHARGE: 0
EYE PAIN: 0
BACK PAIN: 1
EYE ITCHING: 0
EYE REDNESS: 0
BLOOD IN STOOL: 0
CONSTIPATION: 0
SHORTNESS OF BREATH: 0
ABDOMINAL DISTENTION: 0
APNEA: 0
CHEST TIGHTNESS: 0

## 2024-04-08 ASSESSMENT — PATIENT HEALTH QUESTIONNAIRE - PHQ9
1. LITTLE INTEREST OR PLEASURE IN DOING THINGS: NOT AT ALL
SUM OF ALL RESPONSES TO PHQ QUESTIONS 1-9: 0
SUM OF ALL RESPONSES TO PHQ QUESTIONS 1-9: 0
SUM OF ALL RESPONSES TO PHQ9 QUESTIONS 1 & 2: 0
2. FEELING DOWN, DEPRESSED OR HOPELESS: NOT AT ALL
SUM OF ALL RESPONSES TO PHQ QUESTIONS 1-9: 0
SUM OF ALL RESPONSES TO PHQ QUESTIONS 1-9: 0

## 2024-04-08 NOTE — PROGRESS NOTES
round, and reactive to light.   Neck:      Thyroid: No thyromegaly.      Vascular: No JVD.      Trachea: No tracheal deviation.   Cardiovascular:      Rate and Rhythm: Normal rate.      Heart sounds: Normal heart sounds. No murmur heard.     No gallop.   Pulmonary:      Effort: Pulmonary effort is normal. No respiratory distress.      Breath sounds: Normal breath sounds. No stridor. No wheezing or rales.   Chest:      Chest wall: No tenderness.   Abdominal:      General: Bowel sounds are normal. There is no distension.      Palpations: Abdomen is soft.      Tenderness: There is no abdominal tenderness. There is no guarding or rebound.   Musculoskeletal:         General: Normal range of motion.      Cervical back: Normal range of motion and neck supple.   Neurological:      Mental Status: She is alert and oriented to person, place, and time.         Assessment / Plan:   1. Coronary artery disease involving native coronary artery of native heart without angina pectoris  Stable , seen cardiologist , had tress test , which was OK     2. NSVT (nonsustained ventricular tachycardia) (HCC)  Stable , on Lopressor     3. Atherosclerosis of native coronary artery of native heart with angina pectoris (HCC)  Stable , S/p CABG     4. Essential hypertension  Controlled       Return in about 4 months (around 8/8/2024).    Reviewed prior labs and health maintenance.      Discussed use, benefit, and side effects of prescribed medications.  Barriers to medication compliance addressed.  All patient questions answered.  Pt voiced understanding.         Allan Chavarria MD  [facility]  4/8/2024, 10:11 AM    Please note that this chart was generated using voice recognition Dragon dictation software.  Although every effort was made to ensure the accuracy of this automated transcription, some errors in transcription may have occurred.         Visit Information    Have you changed or started any medications since your last visit

## 2024-08-12 ENCOUNTER — HOSPITAL ENCOUNTER (OUTPATIENT)
Dept: GENERAL RADIOLOGY | Facility: CLINIC | Age: 79
Discharge: HOME | End: 2024-08-14
Payer: MEDICARE

## 2024-08-12 ENCOUNTER — HOSPITAL ENCOUNTER (OUTPATIENT)
Facility: CLINIC | Age: 79
Discharge: HOME | End: 2024-08-14
Payer: MEDICARE

## 2024-08-12 ENCOUNTER — OFFICE VISIT (OUTPATIENT)
Dept: INTERNAL MEDICINE CLINIC | Age: 79
End: 2024-08-12

## 2024-08-12 VITALS
OXYGEN SATURATION: 97 % | DIASTOLIC BLOOD PRESSURE: 80 MMHG | SYSTOLIC BLOOD PRESSURE: 130 MMHG | HEART RATE: 51 BPM | WEIGHT: 145 LBS | BODY MASS INDEX: 27.38 KG/M2 | HEIGHT: 61 IN

## 2024-08-12 DIAGNOSIS — G89.29 CHRONIC RIGHT SHOULDER PAIN: ICD-10-CM

## 2024-08-12 DIAGNOSIS — Z00.00 MEDICARE ANNUAL WELLNESS VISIT, SUBSEQUENT: Primary | ICD-10-CM

## 2024-08-12 DIAGNOSIS — M25.511 CHRONIC RIGHT SHOULDER PAIN: ICD-10-CM

## 2024-08-12 PROCEDURE — 73030 X-RAY EXAM OF SHOULDER: CPT

## 2024-08-12 RX ORDER — LIDOCAINE 4 G/G
1 PATCH TOPICAL DAILY
Qty: 30 PATCH | Refills: 0 | Status: SHIPPED | OUTPATIENT
Start: 2024-08-12 | End: 2024-09-11

## 2024-08-12 ASSESSMENT — PATIENT HEALTH QUESTIONNAIRE - PHQ9
SUM OF ALL RESPONSES TO PHQ QUESTIONS 1-9: 0
1. LITTLE INTEREST OR PLEASURE IN DOING THINGS: NOT AT ALL
SUM OF ALL RESPONSES TO PHQ QUESTIONS 1-9: 0
SUM OF ALL RESPONSES TO PHQ QUESTIONS 1-9: 0
2. FEELING DOWN, DEPRESSED OR HOPELESS: NOT AT ALL
SUM OF ALL RESPONSES TO PHQ QUESTIONS 1-9: 0
SUM OF ALL RESPONSES TO PHQ9 QUESTIONS 1 & 2: 0

## 2024-08-12 ASSESSMENT — LIFESTYLE VARIABLES
HOW OFTEN DO YOU HAVE A DRINK CONTAINING ALCOHOL: NEVER
HOW MANY STANDARD DRINKS CONTAINING ALCOHOL DO YOU HAVE ON A TYPICAL DAY: PATIENT DOES NOT DRINK

## 2024-08-12 NOTE — PATIENT INSTRUCTIONS
attack. These may include:    Chest pain or pressure, or a strange feeling in the chest.     Sweating.     Shortness of breath.     Pain, pressure, or a strange feeling in the back, neck, jaw, or upper belly or in one or both shoulders or arms.     Lightheadedness or sudden weakness.     A fast or irregular heartbeat.   After you call 911, the  may tell you to chew 1 adult-strength or 2 to 4 low-dose aspirin. Wait for an ambulance. Do not try to drive yourself.  Watch closely for changes in your health, and be sure to contact your doctor if you have any problems.  Where can you learn more?  Go to https://www.Bet[employer].net/patientEd and enter F075 to learn more about \"A Healthy Heart: Care Instructions.\"  Current as of: June 24, 2023  Content Version: 14.1  © 7397-3026 [employer].   Care instructions adapted under license by [employer]. If you have questions about a medical condition or this instruction, always ask your healthcare professional. [employer] disclaims any warranty or liability for your use of this information.      Personalized Preventive Plan for Dulce Owens - 8/12/2024  Medicare offers a range of preventive health benefits. Some of the tests and screenings are paid in full while other may be subject to a deductible, co-insurance, and/or copay.    Some of these benefits include a comprehensive review of your medical history including lifestyle, illnesses that may run in your family, and various assessments and screenings as appropriate.    After reviewing your medical record and screening and assessments performed today your provider may have ordered immunizations, labs, imaging, and/or referrals for you.  A list of these orders (if applicable) as well as your Preventive Care list are included within your After Visit Summary for your review.    Other Preventive Recommendations:    A preventive eye exam performed by an eye specialist is recommended every 1-2

## 2024-08-12 NOTE — PROGRESS NOTES
Visit Information    Have you changed or started any medications since your last visit including any over-the-counter medicines, vitamins, or herbal medicines? no   Are you having any side effects from any of your medications? -  no  Have you stopped taking any of your medications? Is so, why? -  no    Have you seen any other physician or provider since your last visit? No  Have you had any other diagnostic tests since your last visit? No  Have you been seen in the emergency room and/or had an admission to a hospital since we last saw you? No  Have you had your routine dental cleaning in the past 6 months? no    Have you activated your [employer] account? If not, what are your barriers? Yes     Patient Care Team:  Allan Chavarria MD as PCP - General (Internal Medicine)  Allan Chavarria MD as PCP - Empaneled Provider  Jeremy Ken MD (Gastroenterology)  Katrina Liao MD as Referring Physician (Cardiology)  Bronson Sim MD as Surgeon (Cardiothoracic Surgery)    Medical History Review  Past Medical, Family, and Social History reviewed and does not contribute to the patient presenting condition    Health Maintenance   Topic Date Due    DTaP/Tdap/Td vaccine (1 - Tdap) Never done    Shingles vaccine (1 of 2) Never done    Respiratory Syncytial Virus (RSV) Pregnant or age 60 yrs+ (1 - 1-dose 60+ series) Never done    Pneumococcal 65+ years Vaccine (1 of 1 - PCV) Never done    COVID-19 Vaccine (4 - 2023-24 season) 09/01/2023    Annual Wellness Visit (Medicare Advantage)  01/01/2024    Flu vaccine (1) Never done    Lipids  01/04/2025    Depression Screen  04/08/2025    DEXA (modify frequency per FRAX score)  Completed    Hepatitis A vaccine  Aged Out    Hepatitis B vaccine  Aged Out    Hib vaccine  Aged Out    Polio vaccine  Aged Out    Meningococcal (ACWY) vaccine  Aged Out    Hepatitis C screen  Discontinued

## 2024-08-12 NOTE — PROGRESS NOTES
Medicare Annual Wellness Visit    Dulce Owens is here for Medicare AWV and Arm Pain (Right arm pain, started 1 month ago. )    Assessment & Plan     Recommendations for Preventive Services Due: see orders and patient instructions/AVS.  Recommended screening schedule for the next 5-10 years is provided to the patient in written form: see Patient Instructions/AVS.     No follow-ups on file.     Subjective   The following acute and/or chronic problems were also addressed today:  Patient is here for evaluation multiple medical problems.  She has hypertension, hyperlipidemia, coronary artery disease status post stent s/p CABG in 2015 , osteopenia .  Patient is compliant with diet and medication,  She checks her BP at Home regularly , her BP is OK most of time   Pain in Right Shoulder and arm , worse with picking up heavy Stuff, even Purse   NO Fall /Injury/trauma         Patient's complete Health Risk Assessment and screening values have been reviewed and are found in Flowsheets. The following problems were reviewed today and where indicated follow up appointments were made and/or referrals ordered.    No Positive Risk Factors identified today.                                  Objective   Vitals:    08/12/24 0957   BP: 130/80   Site: Left Upper Arm   Pulse: 51   SpO2: 97%   Weight: 65.8 kg (145 lb)   Height: 1.537 m (5' 0.5\")      Body mass index is 27.85 kg/m².        General Appearance: alert and oriented to person, place and time, well developed and well- nourished, in no acute distress  Skin: warm and dry, no rash or erythema  Head: normocephalic and atraumatic  Eyes: pupils equal, round, and reactive to light, extraocular eye movements intact, conjunctivae normal  Neck: supple and non-tender without mass, no thyromegaly or thyroid nodules, no cervical lymphadenopathy  Pulmonary/Chest: clear to auscultation bilaterally- no wheezes, rales or rhonchi, normal air movement, no respiratory distress  Cardiovascular:

## 2024-08-23 ENCOUNTER — TELEPHONE (OUTPATIENT)
Dept: INTERNAL MEDICINE CLINIC | Age: 79
End: 2024-08-23

## 2024-08-23 NOTE — TELEPHONE ENCOUNTER
Patient called in requesting results from xray of her right shoulder done on 8/12.  Please review and advise next steps

## 2024-09-05 ENCOUNTER — TELEPHONE (OUTPATIENT)
Dept: INTERNAL MEDICINE CLINIC | Age: 79
End: 2024-09-05

## 2024-09-05 NOTE — TELEPHONE ENCOUNTER
Please advise if patient is cleared for procedure, or if you would like her to schedule an additional appointment?  Patient will require directions for holding Plavix if cleared.   Spoke with patient and updated her that we will await Dr. Chavarria's response, but she may need an appointment. Patient agreed.

## 2024-09-05 NOTE — TELEPHONE ENCOUNTER
Medical surgical clearance request received 09/05/24    Surgeon: Dr. Sotelo/ Dr. Banuelos    Procedure: Dental     Date of Procedure: ?    Last appt: 8/12/2024    Next appt: 11/15/2024    PATs received:  No    Placed in Dr. Chavarria's box.

## 2024-09-06 ENCOUNTER — OFFICE VISIT (OUTPATIENT)
Dept: INTERNAL MEDICINE CLINIC | Age: 79
End: 2024-09-06

## 2024-09-06 VITALS
WEIGHT: 146 LBS | OXYGEN SATURATION: 97 % | HEART RATE: 59 BPM | HEIGHT: 60 IN | DIASTOLIC BLOOD PRESSURE: 84 MMHG | BODY MASS INDEX: 28.66 KG/M2 | SYSTOLIC BLOOD PRESSURE: 128 MMHG

## 2024-09-06 DIAGNOSIS — Z01.818 PREOPERATIVE CLEARANCE: ICD-10-CM

## 2024-09-06 DIAGNOSIS — I25.10 CORONARY ARTERY DISEASE INVOLVING NATIVE CORONARY ARTERY OF NATIVE HEART WITHOUT ANGINA PECTORIS: Primary | ICD-10-CM

## 2024-09-06 DIAGNOSIS — I10 ESSENTIAL HYPERTENSION: ICD-10-CM

## 2024-09-06 SDOH — ECONOMIC STABILITY: INCOME INSECURITY: HOW HARD IS IT FOR YOU TO PAY FOR THE VERY BASICS LIKE FOOD, HOUSING, MEDICAL CARE, AND HEATING?: PATIENT DECLINED

## 2024-09-06 SDOH — ECONOMIC STABILITY: FOOD INSECURITY: WITHIN THE PAST 12 MONTHS, YOU WORRIED THAT YOUR FOOD WOULD RUN OUT BEFORE YOU GOT MONEY TO BUY MORE.: PATIENT DECLINED

## 2024-09-06 SDOH — ECONOMIC STABILITY: FOOD INSECURITY: WITHIN THE PAST 12 MONTHS, THE FOOD YOU BOUGHT JUST DIDN'T LAST AND YOU DIDN'T HAVE MONEY TO GET MORE.: PATIENT DECLINED

## 2024-09-06 ASSESSMENT — ENCOUNTER SYMPTOMS
BACK PAIN: 0
COLOR CHANGE: 0
BLOOD IN STOOL: 0
CONSTIPATION: 0
CHOKING: 0
ABDOMINAL PAIN: 0
EYE DISCHARGE: 0
EYE REDNESS: 0
ABDOMINAL DISTENTION: 0
DIARRHEA: 0
COUGH: 0
EYE ITCHING: 0
CHEST TIGHTNESS: 0
APNEA: 0
SHORTNESS OF BREATH: 0
EYE PAIN: 0

## 2024-09-06 ASSESSMENT — PATIENT HEALTH QUESTIONNAIRE - PHQ9
SUM OF ALL RESPONSES TO PHQ9 QUESTIONS 1 & 2: 0
SUM OF ALL RESPONSES TO PHQ QUESTIONS 1-9: 0
SUM OF ALL RESPONSES TO PHQ QUESTIONS 1-9: 0
2. FEELING DOWN, DEPRESSED OR HOPELESS: NOT AT ALL
SUM OF ALL RESPONSES TO PHQ QUESTIONS 1-9: 0
1. LITTLE INTEREST OR PLEASURE IN DOING THINGS: NOT AT ALL
SUM OF ALL RESPONSES TO PHQ QUESTIONS 1-9: 0

## 2024-09-06 NOTE — TELEPHONE ENCOUNTER
Called and spoke with patient, patient scheduled for today, 09/06. Dental procedure scheduled for 09/26.

## 2024-09-06 NOTE — PROGRESS NOTES
Subjective     Patient ID: Dulce Owens is a 79 y.o. female.    HPI   Patient is here for evaluation multiple medical problems.  She has hypertension, hyperlipidemia, coronary artery disease status post stent s/p CABG in 2015 , osteopenia .  Patient is compliant with diet and medication,  She checks her BP at Home regularly , her BP is OK most of time   Had seen cardiologist , had stress test in 12.23,which was OK   Patient is scheduled for dental procedure, upper teeth, she is on dual antiplatelet  History of bilateral knee replacement  Review of Systems   Constitutional:  Negative for activity change, appetite change, chills, diaphoresis, fatigue and fever.   HENT:  Positive for dental problem. Negative for congestion, drooling and ear discharge.    Eyes:  Negative for pain, discharge, redness and itching.   Respiratory:  Negative for apnea, cough, choking, chest tightness and shortness of breath.    Cardiovascular:  Negative for chest pain and leg swelling.   Gastrointestinal:  Negative for abdominal distention, abdominal pain, blood in stool, constipation and diarrhea.   Endocrine: Negative for cold intolerance and heat intolerance.   Genitourinary:  Negative for difficulty urinating, dysuria, enuresis, flank pain and frequency.   Musculoskeletal:  Negative for arthralgias, back pain, gait problem and joint swelling.   Skin:  Negative for color change, pallor and rash.   Neurological:  Negative for dizziness, facial asymmetry, light-headedness, numbness and headaches.   Psychiatric/Behavioral:  Negative for agitation, behavioral problems, confusion, decreased concentration and dysphoric mood.           Objective   Physical Exam  Constitutional:       Appearance: She is well-developed. She is not diaphoretic.   HENT:      Head: Normocephalic and atraumatic.      Comments: Patient wears dentures     Mouth/Throat:      Pharynx: No oropharyngeal exudate.   Eyes:      General: No scleral icterus.        Right

## 2024-11-15 ENCOUNTER — OFFICE VISIT (OUTPATIENT)
Dept: INTERNAL MEDICINE CLINIC | Age: 79
End: 2024-11-15

## 2024-11-15 VITALS
BODY MASS INDEX: 27.79 KG/M2 | OXYGEN SATURATION: 98 % | WEIGHT: 147.2 LBS | HEART RATE: 76 BPM | DIASTOLIC BLOOD PRESSURE: 84 MMHG | HEIGHT: 61 IN | SYSTOLIC BLOOD PRESSURE: 138 MMHG

## 2024-11-15 DIAGNOSIS — I25.10 CORONARY ARTERY DISEASE INVOLVING NATIVE CORONARY ARTERY OF NATIVE HEART WITHOUT ANGINA PECTORIS: Primary | ICD-10-CM

## 2024-11-15 DIAGNOSIS — M54.6 ACUTE MIDLINE THORACIC BACK PAIN: ICD-10-CM

## 2024-11-15 DIAGNOSIS — I10 ESSENTIAL HYPERTENSION: ICD-10-CM

## 2024-11-15 DIAGNOSIS — Z00.00 ENCOUNTER FOR WELL ADULT EXAM WITHOUT ABNORMAL FINDINGS: ICD-10-CM

## 2024-11-15 DIAGNOSIS — M81.0 OSTEOPOROSIS, UNSPECIFIED OSTEOPOROSIS TYPE, UNSPECIFIED PATHOLOGICAL FRACTURE PRESENCE: ICD-10-CM

## 2024-11-15 ASSESSMENT — ENCOUNTER SYMPTOMS
BACK PAIN: 1
BLOOD IN STOOL: 0
ABDOMINAL DISTENTION: 0
COUGH: 0
EYE PAIN: 0
DIARRHEA: 0
EYE ITCHING: 0
EYE REDNESS: 0
CONSTIPATION: 0
CHOKING: 0
SHORTNESS OF BREATH: 0
CHEST TIGHTNESS: 0
ABDOMINAL PAIN: 0
EYE DISCHARGE: 0
APNEA: 0
COLOR CHANGE: 0

## 2024-11-15 NOTE — PROGRESS NOTES
distention, abdominal pain, blood in stool, constipation and diarrhea.   Endocrine: Negative for cold intolerance and heat intolerance.   Genitourinary:  Negative for difficulty urinating, dysuria, enuresis, flank pain and frequency.   Musculoskeletal:  Positive for back pain. Negative for arthralgias, gait problem and joint swelling.   Skin:  Negative for color change, pallor and rash.   Neurological:  Negative for dizziness, facial asymmetry, light-headedness, numbness and headaches.   Psychiatric/Behavioral:  Negative for agitation, behavioral problems, confusion, decreased concentration and dysphoric mood.         PHYSICAL EXAM:      Vitals:    11/15/24 0931   BP: 138/84   Site: Left Upper Arm   Pulse: 76   SpO2: 98%   Weight: 66.8 kg (147 lb 3.2 oz)   Height: 1.537 m (5' 0.5\")     BP Readings from Last 3 Encounters:   11/15/24 138/84   09/06/24 128/84   08/12/24 130/80        Physical Exam  Constitutional:       Appearance: She is well-developed. She is not diaphoretic.   HENT:      Head: Normocephalic and atraumatic.      Comments: Patient wears dentures     Mouth/Throat:      Pharynx: No oropharyngeal exudate.   Eyes:      General: No scleral icterus.        Right eye: No discharge.         Left eye: No discharge.      Conjunctiva/sclera: Conjunctivae normal.      Pupils: Pupils are equal, round, and reactive to light.   Neck:      Thyroid: No thyromegaly.      Vascular: No JVD.      Trachea: No tracheal deviation.   Cardiovascular:      Rate and Rhythm: Normal rate.      Heart sounds: Normal heart sounds. No murmur heard.     No gallop.   Pulmonary:      Effort: Pulmonary effort is normal. No respiratory distress.      Breath sounds: Normal breath sounds. No stridor. No wheezing or rales.   Chest:      Chest wall: No tenderness.   Abdominal:      General: Bowel sounds are normal. There is no distension.      Palpations: Abdomen is soft.      Tenderness: There is no abdominal tenderness. There is no guarding

## 2024-11-15 NOTE — PATIENT INSTRUCTIONS
Well Visit, Over 65: Care Instructions  Well visits can help you stay healthy. Your doctor has checked your overall health and may have suggested ways to take good care of yourself. Your doctor also may have recommended tests. You can help prevent illness with healthy eating, good sleep, vaccinations, regular exercise, and other steps.    Get the tests that you and your doctor decide on. Depending on your age and risks, examples might include hearing tests as well as screening for colon, breast, and lung cancer. Screening helps find diseases before any symptoms appear.   Eat healthy foods. Choose fruits, vegetables, whole grains, lean protein, and low-fat dairy foods. Limit saturated fat, and reduce salt.     Limit alcohol. Men should have no more than 2 drinks a day. Women should have no more than 1. For some people, no alcohol is the best choice.   Exercise. It can help prevent falls. Get at least 30 minutes of exercise on most days of the week. Walking, yoga, and zena chi can be good choices.     Reach and stay at your healthy weight. This will lower your risk for many health problems.   Take care of your mental health. Try to stay connected with friends, family, and community, and find ways to manage stress.     If you're feeling depressed or hopeless, talk to someone. A counselor can help. If you don't have a counselor, talk to your doctor.   Talk to your doctor if you think you may have a problem with alcohol or drug use. This includes prescription medicines and illegal drugs.     Avoid tobacco and nicotine: Don't smoke, vape, or chew. If you need help quitting, talk to your doctor.   Practice safer sex. Getting tested, using condoms or dental dams, and limiting sex partners can help prevent STIs.     Make an advance directive. This is a legal way to tell your family and doctor what you want to happen at the end of your life or when you can't speak for yourself.   Prevent problems where you can. Protect

## 2024-11-25 ENCOUNTER — HOSPITAL ENCOUNTER (OUTPATIENT)
Age: 79
Setting detail: SPECIMEN
Discharge: HOME | End: 2024-11-25

## 2024-11-25 DIAGNOSIS — I25.10 CORONARY ARTERY DISEASE INVOLVING NATIVE CORONARY ARTERY OF NATIVE HEART WITHOUT ANGINA PECTORIS: ICD-10-CM

## 2024-11-25 LAB
ALBUMIN SERPL-MCNC: 4.7 G/DL (ref 3.5–5.2)
ALBUMIN/GLOB SERPL: 2 {RATIO} (ref 1–2.5)
ALP SERPL-CCNC: 58 U/L (ref 35–104)
ALT SERPL-CCNC: 24 U/L (ref 10–35)
ANION GAP SERPL CALCULATED.3IONS-SCNC: 12 MMOL/L (ref 9–16)
AST SERPL-CCNC: 36 U/L (ref 10–35)
BILIRUB SERPL-MCNC: 0.3 MG/DL (ref 0–1.2)
BUN SERPL-MCNC: 11 MG/DL (ref 8–23)
CALCIUM SERPL-MCNC: 10.2 MG/DL (ref 8.6–10.4)
CHLORIDE SERPL-SCNC: 101 MMOL/L (ref 98–107)
CHOLEST SERPL-MCNC: 151 MG/DL (ref 0–199)
CHOLESTEROL/HDL RATIO: 3.1
CO2 SERPL-SCNC: 26 MMOL/L (ref 20–31)
CREAT SERPL-MCNC: 0.8 MG/DL (ref 0.6–0.9)
GFR, ESTIMATED: 74 ML/MIN/1.73M2
GLUCOSE SERPL-MCNC: 93 MG/DL (ref 74–99)
HDLC SERPL-MCNC: 48 MG/DL
LDLC SERPL CALC-MCNC: 76 MG/DL (ref 0–100)
POTASSIUM SERPL-SCNC: 4.4 MMOL/L (ref 3.7–5.3)
PROT SERPL-MCNC: 7.1 G/DL (ref 6.6–8.7)
SODIUM SERPL-SCNC: 139 MMOL/L (ref 136–145)
TRIGL SERPL-MCNC: 137 MG/DL
VLDLC SERPL CALC-MCNC: 27 MG/DL (ref 1–30)

## 2025-04-01 ENCOUNTER — APPOINTMENT (OUTPATIENT)
Dept: CT IMAGING | Age: 80
DRG: 312 | End: 2025-04-01
Payer: MEDICARE

## 2025-04-01 ENCOUNTER — APPOINTMENT (OUTPATIENT)
Age: 80
DRG: 312 | End: 2025-04-01
Payer: MEDICARE

## 2025-04-01 ENCOUNTER — APPOINTMENT (OUTPATIENT)
Dept: GENERAL RADIOLOGY | Age: 80
DRG: 312 | End: 2025-04-01
Payer: MEDICARE

## 2025-04-01 ENCOUNTER — HOSPITAL ENCOUNTER (INPATIENT)
Age: 80
LOS: 1 days | Discharge: HOME | DRG: 312 | End: 2025-04-02
Attending: EMERGENCY MEDICINE
Payer: MEDICARE

## 2025-04-01 ENCOUNTER — APPOINTMENT (OUTPATIENT)
Dept: GENERAL RADIOLOGY | Age: 80
DRG: 312 | End: 2025-04-01
Attending: INTERNAL MEDICINE
Payer: MEDICARE

## 2025-04-01 DIAGNOSIS — R11.2 NAUSEA AND VOMITING, UNSPECIFIED VOMITING TYPE: ICD-10-CM

## 2025-04-01 DIAGNOSIS — R42 DIZZINESS: ICD-10-CM

## 2025-04-01 DIAGNOSIS — R55 SYNCOPE, UNSPECIFIED SYNCOPE TYPE: ICD-10-CM

## 2025-04-01 DIAGNOSIS — R55 SYNCOPE AND COLLAPSE: Primary | ICD-10-CM

## 2025-04-01 PROBLEM — Z86.79 HISTORY OF SUBARACHNOID HEMORRHAGE: Status: ACTIVE | Noted: 2025-04-01

## 2025-04-01 LAB
ABSOLUTE BANDS: 0.63 K/UL (ref 0–1)
ALBUMIN SERPL-MCNC: 4.5 G/DL (ref 3.5–5.2)
ALP SERPL-CCNC: 70 U/L (ref 35–104)
ALT SERPL-CCNC: 23 U/L (ref 10–35)
ANION GAP SERPL CALCULATED.3IONS-SCNC: 13 MMOL/L (ref 9–16)
ANION GAP SERPL CALCULATED.3IONS-SCNC: 14 MMOL/L (ref 9–16)
AST SERPL-CCNC: 29 U/L (ref 10–35)
BACTERIA URNS QL MICRO: ABNORMAL
BANDS: 5 % (ref 0–10)
BASOPHILS # BLD: 0 K/UL (ref 0–0.2)
BASOPHILS NFR BLD: 0 % (ref 0–2)
BILIRUB SERPL-MCNC: 0.3 MG/DL (ref 0–1.2)
BILIRUB UR QL STRIP: NEGATIVE
BUN SERPL-MCNC: 18 MG/DL (ref 8–23)
BUN SERPL-MCNC: 20 MG/DL (ref 8–23)
CALCIUM SERPL-MCNC: 10.4 MG/DL (ref 8.6–10.4)
CALCIUM SERPL-MCNC: 9.3 MG/DL (ref 8.6–10.4)
CASTS #/AREA URNS LPF: ABNORMAL /LPF
CHLORIDE SERPL-SCNC: 101 MMOL/L (ref 98–107)
CHLORIDE SERPL-SCNC: 99 MMOL/L (ref 98–107)
CLARITY UR: CLEAR
CO2 SERPL-SCNC: 24 MMOL/L (ref 20–31)
CO2 SERPL-SCNC: 25 MMOL/L (ref 20–31)
COLOR UR: YELLOW
CREAT SERPL-MCNC: 1 MG/DL (ref 0.7–1.2)
CREAT SERPL-MCNC: 1.1 MG/DL (ref 0.7–1.2)
ECHO AO ASC DIAM: 3.3 CM
ECHO AO ASCENDING AORTA INDEX: 2.04 CM/M2
ECHO AO ROOT DIAM: 3.3 CM
ECHO AO ROOT INDEX: 2.04 CM/M2
ECHO AV AREA PEAK VELOCITY: 2.1 CM2
ECHO AV AREA VTI: 2 CM2
ECHO AV AREA/BSA PEAK VELOCITY: 1.3 CM2/M2
ECHO AV AREA/BSA VTI: 1.2 CM2/M2
ECHO AV MEAN GRADIENT: 5 MMHG
ECHO AV MEAN VELOCITY: 1.1 M/S
ECHO AV PEAK GRADIENT: 10 MMHG
ECHO AV PEAK VELOCITY: 1.6 M/S
ECHO AV VELOCITY RATIO: 0.69
ECHO AV VTI: 30.7 CM
ECHO BSA: 1.65 M2
ECHO EST RA PRESSURE: 3 MMHG
ECHO LA AREA 2C: 16 CM2
ECHO LA AREA 4C: 12.7 CM2
ECHO LA DIAMETER INDEX: 2.47 CM/M2
ECHO LA DIAMETER: 4 CM
ECHO LA MAJOR AXIS: 5 CM
ECHO LA MINOR AXIS: 5.2 CM
ECHO LA TO AORTIC ROOT RATIO: 1.21
ECHO LA VOL BP: 33 ML (ref 22–52)
ECHO LA VOL MOD A2C: 40 ML (ref 22–52)
ECHO LA VOL MOD A4C: 27 ML (ref 22–52)
ECHO LA VOL/BSA BIPLANE: 20 ML/M2 (ref 16–34)
ECHO LA VOLUME INDEX MOD A2C: 25 ML/M2 (ref 16–34)
ECHO LA VOLUME INDEX MOD A4C: 17 ML/M2 (ref 16–34)
ECHO LV E' LATERAL VELOCITY: 8.59 CM/S
ECHO LV E' SEPTAL VELOCITY: 5.11 CM/S
ECHO LV EDV A2C: 52 ML
ECHO LV EDV A4C: 68 ML
ECHO LV EDV INDEX A4C: 42 ML/M2
ECHO LV EDV NDEX A2C: 32 ML/M2
ECHO LV EF PHYSICIAN: 65 %
ECHO LV EJECTION FRACTION A2C: 69 %
ECHO LV EJECTION FRACTION A4C: 65 %
ECHO LV EJECTION FRACTION BIPLANE: 67 % (ref 55–100)
ECHO LV ESV A2C: 16 ML
ECHO LV ESV A4C: 24 ML
ECHO LV ESV INDEX A2C: 10 ML/M2
ECHO LV ESV INDEX A4C: 15 ML/M2
ECHO LV FRACTIONAL SHORTENING: 29 % (ref 28–44)
ECHO LV INTERNAL DIMENSION DIASTOLE INDEX: 2.59 CM/M2
ECHO LV INTERNAL DIMENSION DIASTOLIC: 4.2 CM (ref 3.9–5.3)
ECHO LV INTERNAL DIMENSION SYSTOLIC INDEX: 1.85 CM/M2
ECHO LV INTERNAL DIMENSION SYSTOLIC: 3 CM
ECHO LV IVSD: 1.2 CM (ref 0.6–0.9)
ECHO LV MASS 2D: 178.2 G (ref 67–162)
ECHO LV MASS INDEX 2D: 110 G/M2 (ref 43–95)
ECHO LV POSTERIOR WALL DIASTOLIC: 1.2 CM (ref 0.6–0.9)
ECHO LV RELATIVE WALL THICKNESS RATIO: 0.57
ECHO LVOT AREA: 3.1 CM2
ECHO LVOT AV VTI INDEX: 0.64
ECHO LVOT DIAM: 2 CM
ECHO LVOT MEAN GRADIENT: 2 MMHG
ECHO LVOT PEAK GRADIENT: 4 MMHG
ECHO LVOT PEAK VELOCITY: 1.1 M/S
ECHO LVOT STROKE VOLUME INDEX: 38 ML/M2
ECHO LVOT SV: 61.5 ML
ECHO LVOT VTI: 19.6 CM
ECHO MV A VELOCITY: 1.1 M/S
ECHO MV AREA VTI: 1.9 CM2
ECHO MV E DECELERATION TIME (DT): 225 MS
ECHO MV E VELOCITY: 0.69 M/S
ECHO MV E/A RATIO: 0.63
ECHO MV E/E' LATERAL: 8.03
ECHO MV E/E' RATIO (AVERAGED): 10.77
ECHO MV E/E' SEPTAL: 13.5
ECHO MV LVOT VTI INDEX: 1.69
ECHO MV MAX VELOCITY: 1.4 M/S
ECHO MV MEAN GRADIENT: 3 MMHG
ECHO MV MEAN VELOCITY: 0.8 M/S
ECHO MV PEAK GRADIENT: 7 MMHG
ECHO MV VTI: 33.2 CM
ECHO RA AREA 4C: 10.4 CM2
ECHO RA END SYSTOLIC VOLUME APICAL 4 CHAMBER INDEX BSA: 12 ML/M2
ECHO RA VOLUME: 20 ML
ECHO RIGHT VENTRICULAR SYSTOLIC PRESSURE (RVSP): 22 MMHG
ECHO RV BASAL DIMENSION: 2.9 CM
ECHO RV TAPSE: 1.3 CM (ref 1.7–?)
ECHO TV REGURGITANT MAX VELOCITY: 2.2 M/S
ECHO TV REGURGITANT PEAK GRADIENT: 19 MMHG
EOSINOPHIL # BLD: 0 K/UL (ref 0–0.4)
EOSINOPHILS RELATIVE PERCENT: 0 % (ref 0–4)
EPI CELLS #/AREA URNS HPF: ABNORMAL /HPF
ERYTHROCYTE [DISTWIDTH] IN BLOOD BY AUTOMATED COUNT: 14 % (ref 11.5–14.9)
GFR, ESTIMATED: 50 ML/MIN/1.73M2
GFR, ESTIMATED: 56 ML/MIN/1.73M2
GLUCOSE SERPL-MCNC: 121 MG/DL (ref 74–99)
GLUCOSE SERPL-MCNC: 122 MG/DL (ref 74–99)
GLUCOSE UR STRIP-MCNC: NEGATIVE MG/DL
HCT VFR BLD AUTO: 42.6 % (ref 36–46)
HGB BLD-MCNC: 13.9 G/DL (ref 12–16)
HGB UR QL STRIP.AUTO: NEGATIVE
INR PPP: 1
KETONES UR STRIP-MCNC: NEGATIVE MG/DL
LEUKOCYTE ESTERASE UR QL STRIP: ABNORMAL
LIPASE SERPL-CCNC: 51 U/L (ref 13–60)
LYMPHOCYTES NFR BLD: 0.76 K/UL (ref 1–4.8)
LYMPHOCYTES RELATIVE PERCENT: 6 % (ref 24–44)
MAGNESIUM SERPL-MCNC: 1.7 MG/DL (ref 1.6–2.4)
MCH RBC QN AUTO: 28.3 PG (ref 26–34)
MCHC RBC AUTO-ENTMCNC: 32.7 G/DL (ref 31–37)
MCV RBC AUTO: 86.7 FL (ref 80–100)
MONOCYTES NFR BLD: 0.76 K/UL (ref 0.1–1.3)
MONOCYTES NFR BLD: 6 % (ref 1–7)
MORPHOLOGY: NORMAL
NEUTROPHILS NFR BLD: 83 % (ref 36–66)
NEUTS SEG NFR BLD: 10.45 K/UL (ref 1.3–9.1)
NITRITE UR QL STRIP: NEGATIVE
PH UR STRIP: 7 [PH] (ref 5–8)
PLATELET # BLD AUTO: 283 K/UL (ref 150–450)
PMV BLD AUTO: 7.9 FL (ref 6–12)
POTASSIUM SERPL-SCNC: 4.1 MMOL/L (ref 3.7–5.3)
POTASSIUM SERPL-SCNC: 4.2 MMOL/L (ref 3.7–5.3)
PROT SERPL-MCNC: 7.6 G/DL (ref 6.6–8.7)
PROT UR STRIP-MCNC: NEGATIVE MG/DL
PROTHROMBIN TIME: 13.9 SEC (ref 11.8–14.6)
RBC # BLD AUTO: 4.92 M/UL (ref 4–5.2)
RBC #/AREA URNS HPF: ABNORMAL /HPF
SODIUM SERPL-SCNC: 138 MMOL/L (ref 136–145)
SODIUM SERPL-SCNC: 138 MMOL/L (ref 136–145)
SP GR UR STRIP: 1.04 (ref 1–1.03)
TROPONIN I SERPL HS-MCNC: 10 NG/L (ref 0–14)
TROPONIN I SERPL HS-MCNC: 15 NG/L (ref 0–14)
UROBILINOGEN UR STRIP-ACNC: NORMAL EU/DL (ref 0–1)
WBC #/AREA URNS HPF: ABNORMAL /HPF
WBC OTHER # BLD: 12.6 K/UL (ref 3.5–11)

## 2025-04-01 PROCEDURE — 2580000003 HC RX 258: Performed by: EMERGENCY MEDICINE

## 2025-04-01 PROCEDURE — 85025 COMPLETE CBC W/AUTO DIFF WBC: CPT

## 2025-04-01 PROCEDURE — 71046 X-RAY EXAM CHEST 2 VIEWS: CPT

## 2025-04-01 PROCEDURE — 2500000003 HC RX 250 WO HCPCS: Performed by: NURSE PRACTITIONER

## 2025-04-01 PROCEDURE — 80053 COMPREHEN METABOLIC PANEL: CPT

## 2025-04-01 PROCEDURE — 93306 TTE W/DOPPLER COMPLETE: CPT

## 2025-04-01 PROCEDURE — 71045 X-RAY EXAM CHEST 1 VIEW: CPT

## 2025-04-01 PROCEDURE — 93005 ELECTROCARDIOGRAM TRACING: CPT | Performed by: EMERGENCY MEDICINE

## 2025-04-01 PROCEDURE — 70450 CT HEAD/BRAIN W/O DYE: CPT

## 2025-04-01 PROCEDURE — 80048 BASIC METABOLIC PNL TOTAL CA: CPT

## 2025-04-01 PROCEDURE — 81001 URINALYSIS AUTO W/SCOPE: CPT

## 2025-04-01 PROCEDURE — 85610 PROTHROMBIN TIME: CPT

## 2025-04-01 PROCEDURE — 6360000004 HC RX CONTRAST MEDICATION: Performed by: EMERGENCY MEDICINE

## 2025-04-01 PROCEDURE — 6370000000 HC RX 637 (ALT 250 FOR IP): Performed by: NURSE PRACTITIONER

## 2025-04-01 PROCEDURE — 99285 EMERGENCY DEPT VISIT HI MDM: CPT

## 2025-04-01 PROCEDURE — 96374 THER/PROPH/DIAG INJ IV PUSH: CPT

## 2025-04-01 PROCEDURE — 2500000003 HC RX 250 WO HCPCS: Performed by: EMERGENCY MEDICINE

## 2025-04-01 PROCEDURE — 83690 ASSAY OF LIPASE: CPT

## 2025-04-01 PROCEDURE — 2060000000 HC ICU INTERMEDIATE R&B

## 2025-04-01 PROCEDURE — 99222 1ST HOSP IP/OBS MODERATE 55: CPT | Performed by: PSYCHIATRY & NEUROLOGY

## 2025-04-01 PROCEDURE — 83735 ASSAY OF MAGNESIUM: CPT

## 2025-04-01 PROCEDURE — 92523 SPEECH SOUND LANG COMPREHEN: CPT

## 2025-04-01 PROCEDURE — 84484 ASSAY OF TROPONIN QUANT: CPT

## 2025-04-01 PROCEDURE — 6360000002 HC RX W HCPCS: Performed by: EMERGENCY MEDICINE

## 2025-04-01 PROCEDURE — 99223 1ST HOSP IP/OBS HIGH 75: CPT | Performed by: INTERNAL MEDICINE

## 2025-04-01 PROCEDURE — 36415 COLL VENOUS BLD VENIPUNCTURE: CPT

## 2025-04-01 PROCEDURE — 6370000000 HC RX 637 (ALT 250 FOR IP): Performed by: EMERGENCY MEDICINE

## 2025-04-01 PROCEDURE — 70496 CT ANGIOGRAPHY HEAD: CPT

## 2025-04-01 RX ORDER — SODIUM CHLORIDE 0.9 % (FLUSH) 0.9 %
5-40 SYRINGE (ML) INJECTION EVERY 12 HOURS SCHEDULED
Status: DISCONTINUED | OUTPATIENT
Start: 2025-04-01 | End: 2025-04-02 | Stop reason: HOSPADM

## 2025-04-01 RX ORDER — 0.9 % SODIUM CHLORIDE 0.9 %
100 INTRAVENOUS SOLUTION INTRAVENOUS ONCE
Status: COMPLETED | OUTPATIENT
Start: 2025-04-01 | End: 2025-04-01

## 2025-04-01 RX ORDER — ASPIRIN 81 MG/1
324 TABLET, CHEWABLE ORAL ONCE
Status: COMPLETED | OUTPATIENT
Start: 2025-04-01 | End: 2025-04-01

## 2025-04-01 RX ORDER — SODIUM CHLORIDE 0.9 % (FLUSH) 0.9 %
5-40 SYRINGE (ML) INJECTION PRN
Status: DISCONTINUED | OUTPATIENT
Start: 2025-04-01 | End: 2025-04-02 | Stop reason: HOSPADM

## 2025-04-01 RX ORDER — ATORVASTATIN CALCIUM 20 MG/1
20 TABLET, FILM COATED ORAL NIGHTLY
Status: DISCONTINUED | OUTPATIENT
Start: 2025-04-01 | End: 2025-04-02 | Stop reason: HOSPADM

## 2025-04-01 RX ORDER — ONDANSETRON 2 MG/ML
8 INJECTION INTRAMUSCULAR; INTRAVENOUS ONCE
Status: COMPLETED | OUTPATIENT
Start: 2025-04-01 | End: 2025-04-01

## 2025-04-01 RX ORDER — METOPROLOL TARTRATE 25 MG/1
25 TABLET, FILM COATED ORAL 2 TIMES DAILY
Status: DISCONTINUED | OUTPATIENT
Start: 2025-04-01 | End: 2025-04-02 | Stop reason: HOSPADM

## 2025-04-01 RX ORDER — ONDANSETRON 4 MG/1
4 TABLET, ORALLY DISINTEGRATING ORAL EVERY 8 HOURS PRN
Status: DISCONTINUED | OUTPATIENT
Start: 2025-04-01 | End: 2025-04-02 | Stop reason: HOSPADM

## 2025-04-01 RX ORDER — ONDANSETRON 2 MG/ML
4 INJECTION INTRAMUSCULAR; INTRAVENOUS EVERY 6 HOURS PRN
Status: DISCONTINUED | OUTPATIENT
Start: 2025-04-01 | End: 2025-04-02 | Stop reason: HOSPADM

## 2025-04-01 RX ORDER — CLOPIDOGREL BISULFATE 75 MG/1
75 TABLET ORAL DAILY
Status: DISCONTINUED | OUTPATIENT
Start: 2025-04-01 | End: 2025-04-02 | Stop reason: HOSPADM

## 2025-04-01 RX ORDER — SODIUM CHLORIDE 9 MG/ML
INJECTION, SOLUTION INTRAVENOUS PRN
Status: DISCONTINUED | OUTPATIENT
Start: 2025-04-01 | End: 2025-04-02 | Stop reason: HOSPADM

## 2025-04-01 RX ORDER — FENOFIBRATE 54 MG/1
54 TABLET ORAL DAILY
Status: DISCONTINUED | OUTPATIENT
Start: 2025-04-01 | End: 2025-04-02 | Stop reason: HOSPADM

## 2025-04-01 RX ORDER — SODIUM CHLORIDE 0.9 % (FLUSH) 0.9 %
10 SYRINGE (ML) INJECTION PRN
Status: COMPLETED | OUTPATIENT
Start: 2025-04-01 | End: 2025-04-01

## 2025-04-01 RX ORDER — IOPAMIDOL 755 MG/ML
75 INJECTION, SOLUTION INTRAVASCULAR
Status: COMPLETED | OUTPATIENT
Start: 2025-04-01 | End: 2025-04-01

## 2025-04-01 RX ORDER — ASPIRIN 81 MG/1
81 TABLET ORAL DAILY
Status: DISCONTINUED | OUTPATIENT
Start: 2025-04-02 | End: 2025-04-02 | Stop reason: HOSPADM

## 2025-04-01 RX ORDER — SODIUM CHLORIDE 9 MG/ML
INJECTION, SOLUTION INTRAVENOUS CONTINUOUS
Status: DISCONTINUED | OUTPATIENT
Start: 2025-04-01 | End: 2025-04-02 | Stop reason: HOSPADM

## 2025-04-01 RX ORDER — POLYETHYLENE GLYCOL 3350 17 G/17G
17 POWDER, FOR SOLUTION ORAL DAILY PRN
Status: DISCONTINUED | OUTPATIENT
Start: 2025-04-01 | End: 2025-04-02 | Stop reason: HOSPADM

## 2025-04-01 RX ADMIN — METOPROLOL TARTRATE 25 MG: 25 TABLET, FILM COATED ORAL at 19:25

## 2025-04-01 RX ADMIN — SODIUM CHLORIDE 100 ML: 9 INJECTION, SOLUTION INTRAVENOUS at 02:36

## 2025-04-01 RX ADMIN — IOPAMIDOL 75 ML: 755 INJECTION, SOLUTION INTRAVENOUS at 02:36

## 2025-04-01 RX ADMIN — ATORVASTATIN CALCIUM 20 MG: 20 TABLET, FILM COATED ORAL at 19:25

## 2025-04-01 RX ADMIN — ONDANSETRON 8 MG: 2 INJECTION, SOLUTION INTRAMUSCULAR; INTRAVENOUS at 02:00

## 2025-04-01 RX ADMIN — ASPIRIN 324 MG: 81 TABLET, CHEWABLE ORAL at 04:14

## 2025-04-01 RX ADMIN — SODIUM CHLORIDE, PRESERVATIVE FREE 10 ML: 5 INJECTION INTRAVENOUS at 08:18

## 2025-04-01 RX ADMIN — METOPROLOL TARTRATE 25 MG: 25 TABLET, FILM COATED ORAL at 08:17

## 2025-04-01 RX ADMIN — SODIUM CHLORIDE: 0.9 INJECTION, SOLUTION INTRAVENOUS at 15:33

## 2025-04-01 RX ADMIN — SODIUM CHLORIDE, PRESERVATIVE FREE 10 ML: 5 INJECTION INTRAVENOUS at 02:36

## 2025-04-01 RX ADMIN — FENOFIBRATE 54 MG: 54 TABLET ORAL at 08:17

## 2025-04-01 RX ADMIN — CLOPIDOGREL BISULFATE 75 MG: 75 TABLET, FILM COATED ORAL at 08:17

## 2025-04-01 RX ADMIN — SODIUM CHLORIDE: 0.9 INJECTION, SOLUTION INTRAVENOUS at 04:18

## 2025-04-01 RX ADMIN — SODIUM CHLORIDE, PRESERVATIVE FREE 10 ML: 5 INJECTION INTRAVENOUS at 19:25

## 2025-04-01 ASSESSMENT — PAIN - FUNCTIONAL ASSESSMENT: PAIN_FUNCTIONAL_ASSESSMENT: NONE - DENIES PAIN

## 2025-04-01 NOTE — PROGRESS NOTES
[facility] Internal Medicine  Ajit Barakat MD; Henri Fuentes MD; Yong Brown MD; MD Yessenia Mcdonnell MD; Crow Pack MD  [facility] Internal Medicine   IN-PATIENT SERVICE  [facility]                 Date:   4/1/2025  Patientname:  Dulce Owens  Date of admission:  4/1/2025  1:20 AM  MRN:   [id number]  Account:  [id number]  YOB: 1945  PCP:    Allan Chavarria MD  Room:   2099/2099-01  Code Status:    Full Code      Chief Complaint:     No chief complaint on file.      History of Present Illness:     Dulce Owens is a 80 y.o. Non- / non  female who presents with No chief complaint on file.   and is admitted to the hospital for the management of Syncope and collapse.    Patient's medical history significant for ASHD, CAD-s/p CABG x 5 in 2017, hyperlipidemia, HTN, subarachnoid hemorrhage, and diverticulosis.    According to patient, she was sitting in chair watching TV around 9:30 PM tonight when she began feeling dizzy and lightheaded.  States that her symptoms lasted for a couple of hours and were accompanied by nausea and shaking.  Patient states that she passed out for a brief period and called her daughter upon waking to bring her to the ED.  Reports that upon coming to, she noted that she had vomit on her shirt.  Reports having additional episode of emesis in ED.  On assessment, patient moves all extremities without difficulty.  Concern for possible TIA, given patient's past history presentation of symptoms.  Patient denies chest pain.    CT head shows no acute intercranial abnormality.  CTA head and neck shows no significant stenosis; mild atherosclerosis at the origin of the left vertebral artery.  EKG shows normal sinus rhythm.  Troponin high-sensitivity 15, then 10.  WBC 12.6.    Patient being admitted to PCU for syncope and collapse and TIA workup.  MRI and echocardiogram ordered for a.m.    Past Medical History:     Past

## 2025-04-01 NOTE — PLAN OF CARE
Problem: Discharge Planning  Goal: Discharge to home or other facility with appropriate resources  Outcome: Progressing     Problem: Cardiovascular - Adult  Goal: Maintains optimal cardiac output and hemodynamic stability  Outcome: Progressing  Flowsheets (Taken 4/1/2025 1758)  Maintains optimal cardiac output and hemodynamic stability:   Monitor blood pressure and heart rate   Assess for signs of decreased cardiac output     Problem: Safety - Adult  Goal: Free from fall injury  Outcome: Progressing  Flowsheets (Taken 4/1/2025 1758)  Free From Fall Injury: Instruct family/caregiver on patient safety  Note: The patient remained free from falls this shift, call light within reach, bed in locked and lowest position.  Side rails up x2.  Continue to monitor closely.       Problem: Chronic Conditions and Co-morbidities  Goal: Patient's chronic conditions and co-morbidity symptoms are monitored and maintained or improved  Outcome: Progressing  Flowsheets (Taken 4/1/2025 1758)  Care Plan - Patient's Chronic Conditions and Co-Morbidity Symptoms are Monitored and Maintained or Improved:   Monitor and assess patient's chronic conditions and comorbid symptoms for stability, deterioration, or improvement   Collaborate with multidisciplinary team to address chronic and comorbid conditions and prevent exacerbation or deterioration   Update acute care plan with appropriate goals if chronic or comorbid symptoms are exacerbated and prevent overall improvement and discharge     Problem: Neurosensory - Adult  Goal: Achieves maximal functionality and self care  Outcome: Progressing  Flowsheets (Taken 4/1/2025 1758)  Achieves maximal functionality and self care:   Monitor swallowing and airway patency with patient fatigue and changes in neurological status   Encourage and assist patient to increase activity and self care with guidance from physical therapy/occupational therapy   Encourage visually impaired, hearing impaired and aphasic

## 2025-04-01 NOTE — H&P
the last 72 hours.  No intake or output data in the 24 hours ending 04/01/25 0945    General Appearance: alert, well appearing, and in no acute distress  Mental status: oriented to person, place, and time  Neck: supple, no carotid bruits, thyroid not palpable  Lungs: Bilateral equal air entry, clear to ausculation, no wheezing, rales or rhonchi, normal effort  Cardiovascular: normal rate, regular rhythm, no murmur, gallop, rub  Abdomen: Soft, nontender, nondistended, normal bowel sounds, no hepatomegaly or splenomegaly  Neurologic: There are no new focal motor or sensory deficits, normal muscle tone and bulk, no abnormal sensation, normal speech, cranial nerves II through XII grossly intact  Skin: No gross lesions, rashes, bruising or bleeding on exposed skin area  Extremities: peripheral pulses palpable, no pedal edema or calf pain with palpation      Investigations:      Laboratory Testing:  Recent Results (from the past 24 hours)   CBC with Auto Differential    Collection Time: 04/01/25  1:56 AM   Result Value Ref Range    WBC 12.6 (H) 3.5 - 11.0 k/uL    RBC 4.92 4.0 - 5.2 m/uL    Hemoglobin 13.9 12.0 - 16.0 g/dL    Hematocrit 42.6 36 - 46 %    MCV 86.7 80 - 100 fL    MCH 28.3 26 - 34 pg    MCHC 32.7 31 - 37 g/dL    RDW 14.0 11.5 - 14.9 %    Platelets 283 150 - 450 k/uL    MPV 7.9 6.0 - 12.0 fL    Neutrophils % 83 (H) 36 - 66 %    Lymphocytes % 6 (L) 24 - 44 %    Monocytes % 6 1 - 7 %    Eosinophils % 0 0 - 4 %    Basophils % 0 0 - 2 %    Bands 5 0 - 10 %    Neutrophils Absolute 10.45 (H) 1.3 - 9.1 k/uL    Lymphocytes Absolute 0.76 (L) 1.0 - 4.8 k/uL    Monocytes Absolute 0.76 0.1 - 1.3 k/uL    Eosinophils Absolute 0.00 0.0 - 0.4 k/uL    Basophils Absolute 0.00 0.0 - 0.2 k/uL    Absolute Bands 0.63 0.0 - 1.0 k/uL    Morphology Normal    Comprehensive Metabolic Panel    Collection Time: 04/01/25  1:56 AM   Result Value Ref Range    Sodium 138 136 - 145 mmol/L    Potassium 4.2 3.7 - 5.3 mmol/L    Chloride 99 98 -

## 2025-04-01 NOTE — PROGRESS NOTES
Spoke to DOMINIC Moyer for getting a 2 view chest XR ordered for MRI exam. Pt has had multiple CABG's and has a hx of pacer wires that need to be reviewed by a radiologist. The radiologist has to confirm that they have been completely removed instead of clipped for safety of pt in MRI. If there are any questions, please call #[phone]. Thanks!

## 2025-04-01 NOTE — CARE COORDINATION
Advance Care Planning     Advance Care Planning Activator (Inpatient)  Conversation Note      Date of ACP Conversation: 4/1/2025     Conversation Conducted with: Patient with Decision Making Capacity    ACP Activator: Shayna Brown RN        Health Care Decision Maker:     Current Designated Health Care Decision Maker:     Primary Decision Maker: AndreasMary Beth mercado - [phone]      Care Preferences    Ventilation:  \"If you were in your present state of health and suddenly became very ill and were unable to breathe on your own, what would your preference be about the use of a ventilator (breathing machine) if it were available to you?\"      Would the patient desire the use of ventilator (breathing machine)?: yes    \"If your health worsens and it becomes clear that your chance of recovery is unlikely, what would your preference be about the use of a ventilator (breathing machine) if it were available to you?\"     Would the patient desire the use of ventilator (breathing machine)?: Yes      Resuscitation  \"CPR works best to restart the heart when there is a sudden event, like a heart attack, in someone who is otherwise healthy. Unfortunately, CPR does not typically restart the heart for people who have serious health conditions or who are very sick.\"    \"In the event your heart stopped as a result of an underlying serious health condition, would you want attempts to be made to restart your heart (answer \"yes\" for attempt to resuscitate) or would you prefer a natural death (answer \"no\" for do not attempt to resuscitate)?\" yes       [] Yes   [] No   Educated Patient / Decision Maker regarding differences between Advance Directives and portable DNR orders.    Length of ACP Conversation in minutes:      Conversation Outcomes:  ACP discussion completed    Follow-up plan:    [] Schedule follow-up conversation to continue planning  [] Referred individual to Provider for additional questions/concerns   [] Advised

## 2025-04-01 NOTE — CARE COORDINATION
Case Management Assessment  Initial Evaluation    Date/Time of Evaluation: 4/1/2025 9:53 AM  Assessment Completed by: Shayna Brown RN    If patient is discharged prior to next notation, then this note serves as note for discharge by case management.    Patient Name: Dulce Owens                   YOB: 1945  Diagnosis: Syncope and collapse [R55]  Dizziness [R42]  Nausea and vomiting, unspecified vomiting type [R11.2]                   Date / Time: 4/1/2025  1:20 AM    Patient Admission Status: Inpatient   Readmission Risk (Low < 19, Mod (19-27), High > 27): Readmission Risk Score: 7.1    Current PCP: Allan Chavarria MD  PCP verified by CM? Yes    Chart Reviewed: Yes      History Provided by: Patient  Patient Orientation: Alert and Oriented    Patient Cognition: Alert    Hospitalization in the last 30 days (Readmission):  No    If yes, Readmission Assessment in CM Navigator will be completed.    Advance Directives:      Code Status: Full Code   Patient's Primary Decision Maker is: Legal Next of Kin    Primary Decision Maker: Mary Beth Davis  Child - [phone]    Discharge Planning:    Patient lives with: Alone Type of Home: House  Primary Care Giver: Self  Patient Support Systems include: Children, Family Members   Current Financial resources: Medicare  Current community resources: None  Current services prior to admission: Durable Medical Equipment            Current DME: Melo Joyner            Type of Home Care services:  None    ADLS  Prior functional level: Independent in ADLs/IADLs  Current functional level: Independent in ADLs/IADLs    PT AM-PAC:   /24  OT AM-PAC:   /24    Family can provide assistance at DC: Yes  Would you like Case Management to discuss the discharge plan with any other family members/significant others, and if so, who? No  Plans to Return to Present Housing: Yes  Other Identified Issues/Barriers to RETURNING to current housing: None  Potential Assistance needed at

## 2025-04-01 NOTE — ED NOTES
Situation  Name: Dulce Owens  Admitting: Dx Syncope and collapse [R55]  Isolation Precautions No active isolations  Code Status: Full Code  Alerts: N/A  Where is the patient from? Home  HPI: Pt reports becoming dizzy and passing out at home, states they did lose consciousness, but denies hitting their head. Pt states they have been having some N/V/D.     Background  PMH:   Past Medical History:   Diagnosis Date    Diverticulosis of colon     Full dentures     History of colon polyps 04/22/2017    Hyperlipidemia     Hypertension     Osteoarthritis     Osteoarthritis of hand     Osteoarthritis of lower leg, localized     Osteoporosis     Psoriasis     ON TOP & BACK OF  HEAD    Sinus congestion     IN AM    Tubulovillous adenoma of colon     Wears glasses      Allergies: No Known Allergies  Diet: No diet orders on file  Activity:   Ambulation status: Without assist  Precautions:  N/A  Medications   0.9 % sodium chloride infusion ( IntraVENous New Bag 4/1/25 0418)   ondansetron (ZOFRAN) injection 8 mg (8 mg IntraVENous Given 4/1/25 0200)   sodium chloride 0.9 % bolus 100 mL (0 mLs IntraVENous Stopped 4/1/25 0237)   iopamidol (ISOVUE-370) 76 % injection 75 mL (75 mLs IntraVENous Given 4/1/25 0236)   sodium chloride flush 0.9 % injection 10 mL (10 mLs IntraVENous Given 4/1/25 0236)   aspirin chewable tablet 324 mg (324 mg Oral Given 4/1/25 0414)       LDA codes  Peripherally Inserted Central Catheter:    Central Venous Catheter:    Peripheral Intravenous Line:   Peripheral IV 04/01/25 Left Antecubital (Active)   Site Assessment Clean, dry & intact 04/01/25 0158   Line Status Blood return noted;Flushed;Specimen collected 04/01/25 0158   Phlebitis Assessment No symptoms 04/01/25 0158   Infiltration Assessment 0 04/01/25 0158   Alcohol Cap Used No 04/01/25 0158   Dressing Status New dressing applied 04/01/25 0158   Dressing Type Transparent 04/01/25 0158   Dressing Intervention New 04/01/25 0158     Drain(s):    Airway:

## 2025-04-01 NOTE — ED NOTES
Mode of arrival (squad #, walk in, police, etc) : walk in        Chief complaint(s): Loss of consciousness, dizziness, vomiting, shaking.        Arrival Note (brief scenario, treatment PTA, etc).: patient reports being dizzy at home and passing out. Patient reports she did not fall or hit her head. Patient reports loss of bowels and vomiting while unconscious.         C= \"Have you ever felt that you should Cut down on your drinking?\"  No  A= \"Have people Annoyed you by criticizing your drinking?\"  No  G= \"Have you ever felt bad or Guilty about your drinking?\"  No  E= \"Have you ever had a drink as an Eye-opener first thing in the morning to steady your nerves or to help a hangover?\"  No      Deferred []      Reason for deferring: N/A    *If yes to two or more: probable alcohol abuse.*

## 2025-04-01 NOTE — ED PROVIDER NOTES
EMERGENCY DEPARTMENT ENCOUNTER    Pt Name: Dulce Owens  MRN: [id number]  Birthdate 1945  Date of evaluation: 4/1/25  CHIEF COMPLAINT     Syncope, dizziness    HISTORY OF PRESENT ILLNESS   HPI  Syncopal event at home today.  9 PM when they occurred she felt some lightheadedness dizziness and some nausea vomiting.  She had a syncopal event she blacked out at home.  She was home alone and sitting in a chair when this occurred.  Friend came and got her and brought her here.  No chest pain or abdominal pain.  No diarrhea.  Her symptoms of improved.  Just feels a little lightheaded but no dizziness.  No room spinning.  No slurred speech or facial droop.  No hemiparesis or paresthesias.  No ataxia.      REVIEW OF SYSTEMS     Review of Systems   All other systems reviewed and are negative.    PASTMEDICAL HISTORY     Past Medical History:   Diagnosis Date    Diverticulosis of colon     Full dentures     History of colon polyps 04/22/2017    Hyperlipidemia     Hypertension     Osteoarthritis     Osteoarthritis of hand     Osteoarthritis of lower leg, localized     Osteoporosis     Psoriasis     ON TOP & BACK OF  HEAD    Sinus congestion     IN AM    Tubulovillous adenoma of colon     Wears glasses      Past Problem 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    Osteoarthritis of lower leg, localized M17.10    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

## 2025-04-01 NOTE — CONSULTS
NEUROLOGY INPATIENT CONSULT NOTE    4/1/2025         Dulce Owens is a  80 y.o. female admitted on 4/1/2025 with  Syncope and collapse [R55]  Dizziness [R42]  Nausea and vomiting, unspecified vomiting type [R11.2]    Reason for consult: Syncopal episode; ? TIA  History is obtained mostly from the patient and the medical record and from the caregivers. Chart is reviewed and patient is examined.   Briefly, this is a  80 y.o. female with hx of HTN, HLD, hx of subarachnoid hemorrhage(12/2020) and CAD was admitted on 4/1/2025  with episode of dizziness/lightheadedness with nausea \"for couple of hours\" followed by brief syncopal episode.  Upon waking from that episode; she then called her daughter and patient was brought to ER.  Vital signs on admit: 149/77, 84/minute, 97.9 °F  CT head on admit without acute intracranial abnormalities.  CTA head and neck with no LVO.    Patient stated that she usually goes to bed at about 11:30 PM but yesterday she started having intractable dizziness with nausea leading to the syncopal episode as stated above at about 9:30 PM.  She woke up with the vomit and bladder incontinence.  Denied tongue bite.  Denied postictal lethargy or extremity weakness.  Denied history of seizures    She had history of similar episode in 2020 and at that time, she had small subarachnoid bleed for which she was evaluated by neurosurgery in December 2020 and did not require any surgical intervention.      No current facility-administered medications on file prior to encounter.     Current Outpatient Medications on File Prior to Encounter   Medication Sig Dispense Refill    fenofibrate (TRICOR) 48 MG tablet Take 1 tablet by mouth daily 90 tablet 2    atorvastatin (LIPITOR) 20 MG tablet Take 1 tablet by mouth at bedtime 90 tablet 3    clopidogrel (PLAVIX) 75 MG tablet Take 1 tablet by mouth daily 90 tablet 3    metoprolol tartrate (LOPRESSOR) 25 MG tablet Take 1 tablet by mouth 2 times daily 180 tablet 3

## 2025-04-01 NOTE — PROGRESS NOTES
Facility/Department: [facility] CARE  Initial Speech/Language/Cognitive Assessment    NAME: Dulce Owens  : 1945   MRN: [id number]  ADMISSION DATE: 2025  ADMITTING DIAGNOSIS: has Hypertension; Hyperlipidemia; Tubulovillous adenoma of colon; Psoriasis; Osteoporosis; Osteoarthritis of hand; Osteoarthritis of lower leg, localized; Failure of total knee arthroplasty; S/P total knee replacement; Osteoarthritis of lumbar spine; DJD (degenerative joint disease) of thoracic spine; Thyroid nodule; Hypokalemia; NSTEMI (non-ST elevated myocardial infarction) (HCC); History of colon polyps; Syncope and collapse; CAD (coronary artery disease); Stented coronary artery; Acute bilateral low back pain without sciatica; SAH (subarachnoid hemorrhage) (HCC); Atherosclerotic heart disease of native coronary artery with unspecified angina pectoris; Electrocardiogram showing T wave abnormalities; and NSVT (nonsustained ventricular tachycardia) ([facility]) on their problem list.    Date of Eval: 2025   Evaluating Therapist: CORWIN Calvo    RECENT RESULTS  CT OF HEAD/MRI: - CT head- nothing acute.  MRI in progress    Primary Complaint: Per IM H&P: Dulce Owens is a 80 y.o. Non- / non  female who presents with No chief complaint on file.   and is admitted to the hospital for the management of Syncope and collapse.     Patient's medical history significant for ASHD, CAD-s/p CABG x 5 in 2017, hyperlipidemia, HTN, subarachnoid hemorrhage, and diverticulosis.     According to patient, she was sitting in chair watching TV around 9:30 PM tonight when she began feeling dizzy and lightheaded.  States that her symptoms lasted for a couple of hours and were accompanied by nausea and shaking.  Patient states that she passed out for a brief period and called her daughter upon waking to bring her to the ED.  Reports that upon coming to, she noted that she had vomit on her shirt.  Reports having additional episode

## 2025-04-01 NOTE — PROGRESS NOTES
[facility]   OCCUPATIONAL THERAPY MISSED TREATMENT NOTE   INPATIENT   Date: 25  Patient Name: Dulce Owens       Room:   MRN: [id number]   Account #: [id number]    : 1945  (80 y.o.)  Gender: female                 REASON FOR MISSED TREATMENT:  OT screen completed. Pt is independent with self-care at this time. No skilled OT needs identified. Please reorder if change in status occurs.    -    [id number]        Electronically signed by BARBARA Orourke on 25 at 12:43 PM EDT

## 2025-04-01 NOTE — PROGRESS NOTES
Physical Therapy        Physical Therapy Cancel Note      DATE: 2025    NAME: Dulce Owens  MRN: [id number]   : 1945      Patient not seen this date for Physical Therapy due to:    Patient independent with functional mobility. Will defer PT evaluation at this time. Please reorder PT if future needs arise.     Screen [id number]      Electronically signed by Ariadne Jacinto PT on 2025 at 11:57 AM

## 2025-04-02 ENCOUNTER — APPOINTMENT (OUTPATIENT)
Age: 80
DRG: 312 | End: 2025-04-02
Payer: MEDICARE

## 2025-04-02 ENCOUNTER — APPOINTMENT (OUTPATIENT)
Dept: MRI IMAGING | Age: 80
DRG: 312 | End: 2025-04-02
Payer: MEDICARE

## 2025-04-02 VITALS
DIASTOLIC BLOOD PRESSURE: 76 MMHG | SYSTOLIC BLOOD PRESSURE: 155 MMHG | RESPIRATION RATE: 16 BRPM | OXYGEN SATURATION: 100 % | BODY MASS INDEX: 26.43 KG/M2 | WEIGHT: 140 LBS | TEMPERATURE: 98.4 F | HEART RATE: 64 BPM | HEIGHT: 61 IN

## 2025-04-02 LAB
ANION GAP SERPL CALCULATED.3IONS-SCNC: 9 MMOL/L (ref 9–16)
BUN SERPL-MCNC: 11 MG/DL (ref 8–23)
CALCIUM SERPL-MCNC: 8.6 MG/DL (ref 8.6–10.4)
CHLORIDE SERPL-SCNC: 105 MMOL/L (ref 98–107)
CHOLEST SERPL-MCNC: 106 MG/DL (ref 0–199)
CHOLESTEROL/HDL RATIO: 2.9
CO2 SERPL-SCNC: 25 MMOL/L (ref 20–31)
CREAT SERPL-MCNC: 0.7 MG/DL (ref 0.7–1.2)
ECHO BSA: 1.65 M2
ERYTHROCYTE [DISTWIDTH] IN BLOOD BY AUTOMATED COUNT: 14.6 % (ref 11.5–14.9)
EST. AVERAGE GLUCOSE BLD GHB EST-MCNC: 117 MG/DL
GFR, ESTIMATED: 86 ML/MIN/1.73M2
GLUCOSE SERPL-MCNC: 95 MG/DL (ref 74–99)
HBA1C MFR BLD: 5.7 % (ref 4–6)
HCT VFR BLD AUTO: 35.8 % (ref 36–46)
HDLC SERPL-MCNC: 37 MG/DL
HGB BLD-MCNC: 11.9 G/DL (ref 12–16)
LDLC SERPL CALC-MCNC: 48 MG/DL (ref 0–100)
MCH RBC QN AUTO: 29 PG (ref 26–34)
MCHC RBC AUTO-ENTMCNC: 33.2 G/DL (ref 31–37)
MCV RBC AUTO: 87.3 FL (ref 80–100)
PLATELET # BLD AUTO: 235 K/UL (ref 150–450)
PMV BLD AUTO: 7.6 FL (ref 6–12)
POTASSIUM SERPL-SCNC: 3.6 MMOL/L (ref 3.7–5.3)
RBC # BLD AUTO: 4.1 M/UL (ref 4–5.2)
SODIUM SERPL-SCNC: 139 MMOL/L (ref 136–145)
TRIGL SERPL-MCNC: 105 MG/DL (ref 0–149)
WBC OTHER # BLD: 5.4 K/UL (ref 3.5–11)

## 2025-04-02 PROCEDURE — 83036 HEMOGLOBIN GLYCOSYLATED A1C: CPT

## 2025-04-02 PROCEDURE — 70551 MRI BRAIN STEM W/O DYE: CPT

## 2025-04-02 PROCEDURE — 6370000000 HC RX 637 (ALT 250 FOR IP): Performed by: NURSE PRACTITIONER

## 2025-04-02 PROCEDURE — 85027 COMPLETE CBC AUTOMATED: CPT

## 2025-04-02 PROCEDURE — 93242 EXT ECG>48HR<7D RECORDING: CPT

## 2025-04-02 PROCEDURE — 36415 COLL VENOUS BLD VENIPUNCTURE: CPT

## 2025-04-02 PROCEDURE — 80048 BASIC METABOLIC PNL TOTAL CA: CPT

## 2025-04-02 PROCEDURE — 80061 LIPID PANEL: CPT

## 2025-04-02 PROCEDURE — 99232 SBSQ HOSP IP/OBS MODERATE 35: CPT | Performed by: PSYCHIATRY & NEUROLOGY

## 2025-04-02 PROCEDURE — 6370000000 HC RX 637 (ALT 250 FOR IP): Performed by: INTERNAL MEDICINE

## 2025-04-02 PROCEDURE — 99239 HOSP IP/OBS DSCHRG MGMT >30: CPT | Performed by: INTERNAL MEDICINE

## 2025-04-02 RX ORDER — LOPERAMIDE HYDROCHLORIDE 2 MG/1
2 CAPSULE ORAL ONCE
Status: COMPLETED | OUTPATIENT
Start: 2025-04-02 | End: 2025-04-02

## 2025-04-02 RX ADMIN — FENOFIBRATE 54 MG: 54 TABLET ORAL at 08:31

## 2025-04-02 RX ADMIN — LOPERAMIDE HYDROCHLORIDE 2 MG: 2 CAPSULE ORAL at 18:06

## 2025-04-02 RX ADMIN — CLOPIDOGREL BISULFATE 75 MG: 75 TABLET, FILM COATED ORAL at 08:31

## 2025-04-02 RX ADMIN — METOPROLOL TARTRATE 25 MG: 25 TABLET, FILM COATED ORAL at 08:32

## 2025-04-02 RX ADMIN — ASPIRIN 81 MG: 81 TABLET, COATED ORAL at 08:31

## 2025-04-02 NOTE — PLAN OF CARE
Problem: Discharge Planning  Goal: Discharge to home or other facility with appropriate resources  4/2/2025 0307 by Steven Estrada RN  Outcome: Progressing     Problem: Cardiovascular - Adult  Goal: Maintains optimal cardiac output and hemodynamic stability  4/2/2025 0307 by Steven Estrada RN  Outcome: Progressing     Problem: Neurosensory - Adult  Goal: Achieves maximal functionality and self care  4/2/2025 0307 by Steven Estrada RN  Outcome: Progressing     Problem: Safety - Adult  Goal: Free from fall injury  4/2/2025 0307 by Steven Estrada RN  Outcome: Progressing     Problem: Chronic Conditions and Co-morbidities  Goal: Patient's chronic conditions and co-morbidity symptoms are monitored and maintained or improved  4/2/2025 0307 by Steven Estrada RN  Outcome: Progressing     Problem: ABCDS Injury Assessment  Goal: Absence of physical injury  Outcome: Progressing

## 2025-04-02 NOTE — PLAN OF CARE
Problem: Discharge Planning  Goal: Discharge to home or other facility with appropriate resources  Outcome: Adequate for Discharge     Problem: Cardiovascular - Adult  Goal: Maintains optimal cardiac output and hemodynamic stability  Outcome: Adequate for Discharge     Problem: Safety - Adult  Goal: Free from fall injury  Outcome: Adequate for Discharge     Problem: Chronic Conditions and Co-morbidities  Goal: Patient's chronic conditions and co-morbidity symptoms are monitored and maintained or improved  Outcome: Adequate for Discharge     Problem: Neurosensory - Adult  Goal: Achieves maximal functionality and self care  Outcome: Adequate for Discharge     Problem: ABCDS Injury Assessment  Goal: Absence of physical injury  Outcome: Adequate for Discharge

## 2025-04-02 NOTE — CARE COORDINATION
ONGOING DISCHARGE PLAN:    Patient is alert and oriented x4.    Spoke with patient regarding discharge plan and patient confirms that plan is still home. Denies needs.     Neuro consult  MRI Brain-neg    Cardio consult  Plan for 14 day Holter at discharge  Echo-EF 65-70%    PT/OT      Patient and/or primary caregiver participated in post-hospital care planning and their ability to address care needs was assessed. Yes    Family/caregiver is willing and able to care for patient at home.  NA    Family/caregiver educated on resources available including durable medical equipment and respite care. NA     Will continue to follow for additional discharge needs.    If patient is discharged prior to next notation, then this note serves as note for discharge by case management.    Electronically signed by Ida Sanz RN on 4/2/2025 at 11:59 AM

## 2025-04-02 NOTE — DISCHARGE SUMMARY
[facility] Internal Medicine    Yong Brown MD; Allan Chavarria MD, Henri Fuentes MD, Erica Ferguson MD,Dr. MANAS Aguayo MD. ; Crow Pack MD      [facility] Internal Medicine  IN-PATIENT SERVICE   [facility]    Discharge Summary     Patient ID: Dulce Owens  :  1945   MRN: [id number]     ACCOUNT:  [id number]   Patient's PCP: Allan Chavarria MD  Admit Date: 2025   Discharge Date: 2025     Length of Stay: 1  Code Status:  Full Code  Admitting Physician: Jose Aguayo MD  Discharge Physician: Crow Pack MD     Active Discharge Diagnoses:     Hospital Problem Lists:  Principal Problem:    Syncope and collapse  Active Problems:    Hypertension    Hyperlipidemia    Osteoarthritis of lumbar spine    CAD (coronary artery disease)    Atherosclerotic heart disease of native coronary artery with unspecified angina pectoris    NSVT (nonsustained ventricular tachycardia) (HCC)    Dizziness    History of subarachnoid hemorrhage  Resolved Problems:    * No resolved hospital problems. *      Admission Condition:  Serious      Discharged Condition: Stable     Hospital Stay:     Hospital Course:    80-year-old female admitted with syncope and collapse, with some nausea and vomiting, CT of the head negative for any abnormalities, MRI pending, echocardiogram reviewed by cardiology, EF normal, EKG and troponin are negative, neurology on board, patient was not postictal,  Orthostatics were negative,  Has underlying history of hypertension, hyperlipidemia,  Coronary disease with CABG, x 4, multiple stents, continue aspirin Plavix statins fenofibrate and metoprolol,cardiology on board,  History of nonsustained V. tach in the past,  DVT prophylaxis with Lovenox,  Will wait for neurology to clear her,  Okay to discharge per cardiology  MRI has been done after clearance,  Did not show any acute stroke or any acute finding other than some chronic cerebral

## 2025-04-02 NOTE — CONSULTS
[facility]    Consult                        Today's Date: 4/2/2025  Patient Name: Dulce Owens  Date of admission: 4/1/2025  1:20 AM  Patient's age: 80 y.o., 1945  Admission Dx: Syncope and collapse [R55]  Dizziness [R42]  Nausea and vomiting, unspecified vomiting type [R11.2]    Reason for Consult:  Cardiac evaluation    Requesting Physician: Jose Aguayo MD    CHIEF COMPLAINT:  Syncope and collapse    History Obtained From:  patient, electronic medical record    HISTORY OF PRESENT ILLNESS:      Per previous documentation: \"Patient's medical history significant for ASHD, CAD-s/p CABG x 5 in 2017, hyperlipidemia, HTN, subarachnoid hemorrhage, and diverticulosis.     According to patient, she was sitting in chair watching TV around 9:30 PM tonight when she began feeling dizzy and lightheaded.  States that her symptoms lasted for a couple of hours and were accompanied by nausea and shaking.  Patient states that she passed out for a brief period and called her daughter upon waking to bring her to the ED.  Reports that upon coming to, she noted that she had vomit on her shirt.  Reports having additional episode of emesis in ED.  On assessment, patient moves all extremities without difficulty.  Concern for possible TIA, given patient's past history presentation of symptoms.  Patient denies chest pain.     CT head shows no acute intercranial abnormality.  CTA head and neck shows no significant stenosis; mild atherosclerosis at the origin of the left vertebral artery.  EKG shows normal sinus rhythm.  Troponin high-sensitivity 15, then 10.  WBC 12.6.     Patient being admitted to PCU for syncope and collapse and TIA workup.  MRI and echocardiogram ordered for a.m.\"    Pt seen and examined in the room.  Patient resting in bed with RN and SN at bedside. Pt denies any CP or sob. Reports occasional dizziness.  Labs, vitals and tele reviewed    Cardiology consulted for syncope with CAD and

## 2025-04-02 NOTE — PROGRESS NOTES
Discussed medication(s) with the patient and all questions fully answered. Educated on when to follow up with PCP, Cardiology and Neurology.  No concerns noted.  All belongings left with the patient via wheelchair.

## 2025-04-02 NOTE — PROGRESS NOTES
[facility] Internal Medicine  Yong Brown MD; Allan Chavarria MD, Henri Fuentes MD, Erica Ferguson MD,    Crow Pack MD, Jose Aguayo MD.    [facility] Internal Medicine   IN-PATIENT SERVICE   [facility]    Progress note            Date:   4/2/2025  Patient name:  Dulce Owens  Date of admission:  4/1/2025  1:20 AM  MRN:   [id number]  Account:  [id number]  YOB: 1945  PCP:    Allan Chavarria MD  Room:   2099/2099-01  Code Status:    Full Code    Chief Complaint:     No chief complaint on file.  Syncope     History Obtained From:     Patient/EMR/Bedside RN    History of Present Illness:     Dulce Owens is a 80 y.o. Non- / non  female who presents with No chief complaint on file.   and is admitted to the hospital for the management of Syncope and collapse.     Patient's medical history significant for ASHD, CAD-s/p CABG x 5 in 2017, hyperlipidemia, HTN, subarachnoid hemorrhage, and diverticulosis.     According to patient, she was sitting in chair watching TV around 9:30 PM tonight when she began feeling dizzy and lightheaded.  States that her symptoms lasted for a couple of hours and were accompanied by nausea and shaking.  Patient states that she passed out for a brief period and called her daughter upon waking to bring her to the ED.  Reports that upon coming to, she noted that she had vomit on her shirt.  Reports having additional episode of emesis in ED.  On assessment, patient moves all extremities without difficulty.  Concern for possible TIA, given patient's past history presentation of symptoms.  Patient denies chest pain.     CT head shows no acute intercranial abnormality.  CTA head and neck shows no significant stenosis; mild atherosclerosis at the origin of the left vertebral artery.  EKG shows normal sinus rhythm.  Troponin high-sensitivity 15, then 10.  WBC 12.6.     Patient being admitted to PCU for syncope and

## 2025-04-02 NOTE — PROGRESS NOTES
NEUROLOGY INPATIENT PROGRESS NOTE    4/2/2025         Dulce Owens is a  80 y.o. female admitted on 4/1/2025 with  Syncope and collapse [R55]  Dizziness [R42]  Nausea and vomiting, unspecified vomiting type [R11.2]    Reason for consult: Syncopal episode; ? TIA  History is obtained mostly from the patient and the medical record and from the caregivers. Chart is reviewed and patient is examined.   Briefly, this is a  80 y.o. female with hx of HTN, HLD, hx of subarachnoid hemorrhage(12/2020) and CAD was admitted on 4/1/2025  with episode of dizziness/lightheadedness with nausea \"for couple of hours\" followed by brief syncopal episode.  Upon waking from that episode; she then called her daughter and patient was brought to ER.  Vital signs on admit: 149/77, 84/minute, 97.9 °F  CT head on admit without acute intracranial abnormalities.  CTA head and neck with no LVO.    Patient stated that she usually goes to bed at about 11:30 PM but yesterday she started having intractable dizziness with nausea leading to the syncopal episode as stated above at about 9:30 PM.  She woke up with the vomit and bladder incontinence.  Denied tongue bite.  Denied postictal lethargy or extremity weakness.  Denied history of seizures    She had history of similar episode in 2020 and at that time, she had small subarachnoid bleed for which she was evaluated by neurosurgery in December 2020 and did not require any surgical intervention.  4/2/2025: Chart reviewed and discussed with caregivers.    No current facility-administered medications on file prior to encounter.     Current Outpatient Medications on File Prior to Encounter   Medication Sig Dispense Refill    fenofibrate (TRICOR) 48 MG tablet Take 1 tablet by mouth daily 90 tablet 2    atorvastatin (LIPITOR) 20 MG tablet Take 1 tablet by mouth at bedtime 90 tablet 3    clopidogrel (PLAVIX) 75 MG tablet Take 1 tablet by mouth daily 90 tablet 3    metoprolol tartrate (LOPRESSOR) 25 MG

## 2025-04-03 ENCOUNTER — CARE COORDINATION (OUTPATIENT)
Dept: CARE COORDINATION | Age: 80
End: 2025-04-03

## 2025-04-03 DIAGNOSIS — R55 SYNCOPE AND COLLAPSE: Primary | ICD-10-CM

## 2025-04-03 PROCEDURE — 1111F DSCHRG MED/CURRENT MED MERGE: CPT | Performed by: INTERNAL MEDICINE

## 2025-04-03 NOTE — CARE COORDINATION
Care Transitions Note    Initial Call - Call within 2 business days of discharge: Yes    Attempted to reach patient for transitions of care follow up. Unable to reach patient.    Outreach Attempts:   HIPAA compliant voicemail left for patient.     Patient: Dulce Owens    Patient : 1945   MRN: [id number]    Reason for Admission: Syncope and collapse  Discharge Date: 25  RURS: Readmission Risk Score: 8.8    Last Discharge Facility       Date Complaint Diagnosis Description Type Department Provider    25 vomiting, loss of consciousness Syncope and collapse ... ED to Hosp-Admission (Discharged) (ADMITTED) Jose Britt MD; Kadeem,...            Was this an external facility discharge? No    Follow Up Appointment:   Patient has hospital follow up appointment scheduled within 7 days of discharge.    Future Appointments         Provider Specialty Dept Phone    2025 1:00 PM Allan Chavarria MD Internal Medicine [phone]    4/15/2025 11:15 AM Juno Red APRN - NP Cardiology [phone]    2025 10:30 AM Manjinder Wagner MD Cardiology [phone]            Plan for follow-up on next business day.      TRISHA VELARDE RN    Care Transitions Note    Initial Call - Call within 2 business days of discharge: Yes    Patient Current Location:  Home: [address] [place] [address]    Care Transition Nurse contacted the patient by telephone to perform post hospital discharge assessment, verified name and  as identifiers.  Provided introduction to self, and explanation of the Care Transition Nurse role.    Patient: Dulce Owens    Patient : 1945   MRN: [id number]    Reason for Admission: Syncope and collapse  Discharge Date: 25  RURS: Readmission Risk Score: 8.8      Last Discharge Facility       Date Complaint Diagnosis Description Type Department Provider    25 vomiting, loss of consciousness Syncope and collapse ... ED to Hosp-Admission (Discharged)

## 2025-04-04 LAB
EKG ATRIAL RATE: 77 BPM
EKG P AXIS: 57 DEGREES
EKG P-R INTERVAL: 192 MS
EKG Q-T INTERVAL: 376 MS
EKG QRS DURATION: 78 MS
EKG QTC CALCULATION (BAZETT): 425 MS
EKG R AXIS: -17 DEGREES
EKG T AXIS: 108 DEGREES
EKG VENTRICULAR RATE: 77 BPM

## 2025-04-07 ENCOUNTER — OFFICE VISIT (OUTPATIENT)
Dept: INTERNAL MEDICINE CLINIC | Age: 80
End: 2025-04-07

## 2025-04-07 VITALS
OXYGEN SATURATION: 90 % | DIASTOLIC BLOOD PRESSURE: 82 MMHG | HEART RATE: 61 BPM | WEIGHT: 142 LBS | SYSTOLIC BLOOD PRESSURE: 138 MMHG | BODY MASS INDEX: 26.84 KG/M2

## 2025-04-07 DIAGNOSIS — Z09 HOSPITAL DISCHARGE FOLLOW-UP: Primary | ICD-10-CM

## 2025-04-07 ASSESSMENT — PATIENT HEALTH QUESTIONNAIRE - PHQ9
SUM OF ALL RESPONSES TO PHQ QUESTIONS 1-9: 0
SUM OF ALL RESPONSES TO PHQ QUESTIONS 1-9: 0
2. FEELING DOWN, DEPRESSED OR HOPELESS: NOT AT ALL
1. LITTLE INTEREST OR PLEASURE IN DOING THINGS: NOT AT ALL
SUM OF ALL RESPONSES TO PHQ QUESTIONS 1-9: 0
SUM OF ALL RESPONSES TO PHQ QUESTIONS 1-9: 0

## 2025-04-07 NOTE — PROGRESS NOTES
Post-Discharge Transitional Care  Follow Up      Dulce Owens   YOB: 1945    Date of Office Visit:  4/7/2025  Date of Hospital Admission: 4/1/25  Date of Hospital Discharge: 4/2/25  Risk of hospital readmission (high >=14%. Medium >=10%) :Readmission Risk Score: 8.8      Care management risk score Rising risk (score 2-5) and Complex Care (Scores >=6): No Risk Score On File     Non face to face  following discharge, date last encounter closed (first attempt may have been earlier): 04/03/2025    Call initiated 2 business days of discharge: Yes    ASSESSMENT/PLAN:       Medical Decision Making: high complexity  No follow-ups on file.    On this date 4/7/2025 I have spent 35  minutes reviewing previous notes, test results and face to face with the patient discussing the diagnosis and importance of compliance with the treatment plan as well as documenting on the day of the visit.       Subjective:   HPI:  Follow up of Hospital problems/diagnosis(es): Syncope     Inpatient course: Discharge summary reviewed- see chart.  Patient is here for transitional care, she has history of coronary artery disease, hypertension, CABG, multiple stents, hyperlipidemia, has history of V. tach in the past  Patient admitted to [facility] after syncopal episode, patient had extensive testing including CT head, CTA head and neck, MRI brain which was negative for stroke, eval by cardiologist and neurologist  Patient is currently wearing a Holter monitor, she is going to give her Holter monitor back at 4 PM today  Will have EEG as outpatient which is scheduled on 17th  Had extensive lab work, discussed with the patient  Patient feeling much better today  She told me she takes Zanaflex only as needed for back pain  No other complaints  No similar episode after, she was discharged from hospital  Has appointment coming with neurologist  Interval history/Current status: \improved     Patient Active Problem List

## 2025-04-07 NOTE — PROGRESS NOTES
\"Have you been to the ER, urgent care clinic since your last visit?  Hospitalized since your last visit?\"    YES - When: approximately 2/1-2/2/25 ago.  Where and Why:  .Syncope and collapse     “Have you seen or consulted any other health care providers outside our system since your last visit?”    NO

## 2025-04-10 ENCOUNTER — CARE COORDINATION (OUTPATIENT)
Dept: CARE COORDINATION | Age: 80
End: 2025-04-10

## 2025-04-10 NOTE — CARE COORDINATION
Care Transitions Note    Follow Up Call     Attempted to reach patient for transitions of care follow up.  Unable to reach patient.      Outreach Attempts:   HIPAA compliant voicemail left for patient.     Care Summary Note: 1st attempt    Follow Up Appointment:   Future Appointments         Provider Specialty Dept Phone    4/15/2025 11:15 AM Juno Red APRN - NP Cardiology [phone]    4/17/2025 10:30 AM ST EEG  [phone]    5/12/2025 10:30 AM Manjinder Wagner MD Cardiology [phone]    6/10/2025 10:00 AM Sandra Parmar MD Neurology [phone]    7/16/2025 10:15 AM Allan Chavarria MD Internal Medicine [phone]            Plan for follow-up on next business day.  based on severity of symptoms and risk factors. Plan for next call: symptom management-follow up on syncopal episodes, cardio/neurological symptoms.  follow-up appointment-with PCP    TRISHA VELARDE RN

## 2025-04-11 ENCOUNTER — CARE COORDINATION (OUTPATIENT)
Dept: CARE COORDINATION | Age: 80
End: 2025-04-11

## 2025-04-11 NOTE — CARE COORDINATION
Care Transitions Note    Follow Up Call     Attempted to reach patient for transitions of care follow up.  Unable to reach patient.      Outreach Attempts:   Multiple attempts to contact patient at phone numbers on file.   HIPAA compliant voicemail left for patient.     Care Summary Note: Final attempt    Follow Up Appointment:   Future Appointments         Provider Specialty Dept Phone    4/15/2025 11:15 AM Juno Red APRN - NP Cardiology [phone]    4/17/2025 10:30 AM [facility] EEG  [phone]    5/12/2025 10:30 AM Manjinder Wagner MD Cardiology [phone]    6/10/2025 10:00 AM Sandra Parmar MD Neurology [phone]    7/16/2025 10:15 AM Allan Chavarria MD Internal Medicine [phone]            No further follow-up call indicated based on severity of symptoms and risk factors. Plan for next call:  final attempt    TRISHA VELARDE RN

## 2025-04-17 ENCOUNTER — HOSPITAL ENCOUNTER (OUTPATIENT)
Dept: NEUROLOGY | Age: 80
Discharge: HOME | End: 2025-04-17
Attending: PSYCHIATRY & NEUROLOGY
Payer: MEDICARE

## 2025-04-17 DIAGNOSIS — R55 SYNCOPE, UNSPECIFIED SYNCOPE TYPE: ICD-10-CM

## 2025-04-17 PROCEDURE — 95816 EEG AWAKE AND DROWSY: CPT

## 2025-04-17 NOTE — PROCEDURES
EEG REPORT       Patient: Dulce Owens Age: 80 y.o.  MRN: [id number]      Referring Provider: Lena Parmar*    History: This routine 30 minute scalp EEG was recorded with video- monitoring for a 80 y.o.. female  who presented with encephalopathy. This EEG was performed to evaluate for focal and epileptiform abnormalities.     Dulce Owens   Current Outpatient Medications   Medication Sig Dispense Refill    fenofibrate (TRICOR) 48 MG tablet Take 1 tablet by mouth daily 90 tablet 2    atorvastatin (LIPITOR) 20 MG tablet Take 1 tablet by mouth at bedtime 90 tablet 3    clopidogrel (PLAVIX) 75 MG tablet Take 1 tablet by mouth daily 90 tablet 3    metoprolol tartrate (LOPRESSOR) 25 MG tablet Take 1 tablet by mouth 2 times daily 180 tablet 3    aspirin 81 MG EC tablet Take 1 tablet by mouth daily      nitroGLYCERIN (NITROSTAT) 0.4 MG SL tablet Place 1 tablet under the tongue every 5 minutes as needed for Chest pain up to max of 3 total doses. If no relief after 1 dose, call 911. 25 tablet 3    Vitamin D (CHOLECALCIFEROL) 1000 UNITS CAPS capsule Take 1 capsule by mouth daily      Multiple Vitamins-Minerals (THERAPEUTIC MULTIVITAMIN-MINERALS) tablet Take 1 tablet by mouth daily      Calcium Carbonate-Vitamin D (CALCIUM + D PO) Take 500 mg by mouth daily.      ascorbic acid (VITAMIN C) 500 MG tablet Take 1 tablet by mouth daily      acetaminophen (TYLENOL) 325 MG tablet Take 2 tablets by mouth every 6 hours as needed       No current facility-administered medications for this encounter.        Technical Description: This is a 21 channel digital EEG recording with time-locked video. Electrodes were placed in accordance with the 10-20 International System of Electrode Placement. Single lead EKG monitoring as well as temporal electrodes were included.    The patient was not sleep deprived. This recording was obtained during wakefulness.  EEG Description: The dominant background activity during maximal recorded

## 2025-04-20 LAB — ECHO BSA: 1.65 M2

## 2025-04-21 NOTE — PLAN OF CARE
"Outpatient Psychiatry Follow-Up Visit (MD/RADHA)    4/21/2025    Clinical Status of Patient:  Outpatient (Ambulatory)    Chief Complaint:  Maria G Cameron is a 59 y.o. female who presents today for follow-up of depression and anxiety.  Met with patient.      Interval History and Content of Current Session 04/21/2025:    Pt reports today: "I am feeling well no complaints"    Mood overall is "good"    Rates depression as 0/10 and anxiety as 4/10 over the past 2 weeks.    Patients mood is calm, affect appears relaxed. Linear and logical, friendly and cooperative, good eye contact.    Denies SI/HI/AVH. Pt reports sleeping well and normal appetite. Denies side effects of medications.    Pt reports taking medications as prescribed and behaving appropriately during interview today.  Patient states she tried to taper off the Zoloft  to start the Prozac but stopped because she didn't like how the Prozac made her feel so she started back on the Zoloft 200 mg. Patient states she is able to do errands and complete projects she had put off for months. States she has lost weight with Zepbound and feels a difference in her mood. Patient states she has also been working on boundaries when it comes to her family and feels that has also caused her to feel better as well. Denies any thoughts of wanting to hurt herself. States she is exacted about life and is in a good place sleeping better and feeling better about herself.     Psychotherapy:  Target symptoms: depression, anxiety   Why chosen therapy is appropriate versus another modality: relevant to diagnosis  Outcome monitoring methods: self-report, observation  Therapeutic intervention type: insight oriented psychotherapy  Topics discussed/themes:  family issues, building skills sets for symptom management, symptom recognition  The patient's response to the intervention is accepting. The patient's progress toward treatment goals is fair.   Duration of intervention: 10 " Problem: Discharge Planning  Goal: Discharge to home or other facility with appropriate resources  10/19/2023 0010 by Edith Mathias RN  Outcome: Progressing  Flowsheets (Taken 10/18/2023 2000)  Discharge to home or other facility with appropriate resources:   Identify barriers to discharge with patient and caregiver   Identify discharge learning needs (meds, wound care, etc)     Problem: Safety - Adult  Goal: Free from fall injury  10/19/2023 0010 by Edith Mathias RN  Outcome: Progressing     Problem: ABCDS Injury Assessment  Goal: Absence of physical injury  10/19/2023 0010 by Edith Mathias RN  Outcome: Progressing "minutes.      Prior visit 2024:      Pt reports today: "Up and down but I am good"     Mood overall is "good states a little anxiety"     Rates depression as 0/10 and anxiety as 5/10 over the past 2 weeks.     Patients mood is anxious, affect appears anxious. Linear and logical, friendly and cooperative, good eye contact.     Denies SI/HI/AVH. Pt reports sleeping well and normal appetite. Denies side effects of medications.     Pt reports taking medications as prescribed and behaving appropriately during interview today. States she is fine on the Zoloft 200 mg states it takes the edge off but she still has spikes of anxiety. States she would like to discuss other medication options for anxiety after the holidays. States work is always a stressor in her life. States she moved to get away from her family and now they pop up to stay with her uninvited. States she invited her niece to stay with her but it's hard because her apartment is very small. Patient states her sister is coming to visit this week for 2 weeks and she didn't invite her to come. Patient states when her mom was sick and after she  her sister did some mean things and now the family is not talking to her. Patient states she doesn't know how the visit will go.   States she is more anxious at times and she is still taking Klonopin 0.5 mg but she cuts the tab in half. States she takes it as needed once a day. States when she takes it she notices it helps.   States she has herself to live for.    ::            Review of Systems     ROS    Psychiatric Review Of Systems - Is patient experiencing or having changes in:  sleep: yes  appetite: yes  weight: no  energy/anergy: yes  interest/pleasure/anhedonia: yes  somatic symptoms: no  libido: no  anxiety/panic: no  guilty/hopelessness: yes about her sister   concentration: no  S.I.B.s/risky behavior: no  Irritability: no  Racing thoughts: no  Impulsive behaviors: no  Paranoia: no  AVH: no      Past " Medical, Family and Social History: The patient's past medical, family and social history have been reviewed and updated as appropriate within the electronic medical record - see encounter notes.      Current Medications:   Medication List with Changes/Refills   Current Medications    BUTALBITAL-ACETAMINOPHEN-CAFFEINE -40 MG (FIORICET, ESGIC) -40 MG PER TABLET    Take 1 tablet by mouth every 4 (four) hours as needed for Pain.    CLONAZEPAM (KLONOPIN) 0.5 MG TABLET    Take 1 tablet (0.5 mg total) by mouth daily as needed for Anxiety.    ENALAPRIL (VASOTEC) 20 MG TABLET    Take 1 tablet (20 mg total) by mouth once daily.    FLUOXETINE 10 MG CAPSULE    Take 2 capsules (20 mg total) by mouth once daily. Take 1 capsule (10 mg) for seven days and then take 2 capsules (20 mg)    FLUTICASONE PROPIONATE (FLONASE) 50 MCG/ACTUATION NASAL SPRAY    2 sprays (100 mcg total) by Each Nostril route daily as needed (nasal congestion).    HYDROCHLOROTHIAZIDE (HYDRODIURIL) 25 MG TABLET    Take 1 tablet (25 mg total) by mouth once daily.    LEVOTHYROXINE (SYNTHROID) 125 MCG TABLET    Take 1 tablet (125 mcg total) by mouth before breakfast.    PROMETHAZINE (PROMETHEGAN) 50 MG SUPPOSITORY    Unwrap and insert 1 suppository (50 mg total) rectally every 6 (six) hours as needed for Nausea.    PROMETHAZINE-DEXTROMETHORPHAN (PROMETHAZINE-DM) 6.25-15 MG/5 ML SYRP    Take 5 mLs by mouth nightly as needed (cough).    TIRZEPATIDE, WEIGHT LOSS, (ZEPBOUND) 7.5 MG/0.5 ML PNIJ    Inject 7.5 mg into the skin every 7 days.    VERAPAMIL (VERELAN) 360 MG C24P    Take 1 capsule (360 mg total) by mouth once daily.         Allergies:   Review of patient's allergies indicates:   Allergen Reactions    Adhesive Blisters    Imitrex [sumatriptan] Shortness Of Breath    Codeine Nausea And Vomiting         Vitals   Vitals:    04/21/25 1457   BP: 118/76   Pulse: 91          Labs/Imaging/Studies:   No results found for this or any previous visit (from the  "past 48 hours).   No results found for: "PHENYTOIN", "PHENOBARB", "VALPROATE", "CBMZ"    Compliance: yes    Side effects: None    Risk Parameters:  Patient reports no suicidal ideation  Patient reports no homicidal ideation  Patient reports no self-injurious behavior  Patient reports no violent behavior    Exam (detailed: at least 9 elements; comprehensive: all 15 elements)   Constitutional  Vitals:  Most recent vital signs, dated less than 90 days prior to this appointment, were reviewed.   Vitals:    04/21/25 1457   BP: 118/76   Pulse: 91   Weight: 106.5 kg (234 lb 12.6 oz)        General:  unremarkable, age appropriate, casually dressed, well dressed     Musculoskeletal  Muscle Strength/Tone:  not examined   Gait & Station:  non-ataxic     Psychiatric  Speech:  no latency; no press   Mood & Affect:  steady  congruent and appropriate   Thought Process:  normal and logical   Associations:  intact   Thought Content:  normal, no suicidality, no homicidality, delusions, or paranoia   Insight:  limited awareness of illness   Judgement: behavior is adequate to circumstances, age appropriate   Orientation:  grossly intact, person, place, situation, time/date, day of week, month of year, year   Memory: intact for content of interview   Language: grossly intact   Attention Span & Concentration:  able to focus   Fund of Knowledge:  intact and appropriate to age and level of education     Assessment and Diagnosis   Status/Progress: Based on the examination today, the patient's problem(s) is/are well controlled.  New problems have not been presented today.   Co-morbidities are not complicating management of the primary condition.  There are no active rule-out diagnoses for this patient at this time.     General Impression:    Adjustment disorder with mixed anxiety and depressive disorder,     Intervention/Counseling/Treatment Plan   Medication Management: Continue current medications. The risks and benefits of medication were " discussed with the patient.      -Continue Zoloft 200 mg daily patient states she did not like the Prozac and started back on the Zoloft/.       The risks and benefits of medication were discussed with the patient.  Discussed diagnosis, risks and benefits of proposed treatment above vs alternative treatments vs no treatment, and potential side effects of these treatments. The patient expresses understanding of the above and displays the capacity to agree with this treatment given said understanding. Patient also agrees that, currently, the benefits outweigh the risks and would like to pursue treatment at this time.  Discussed inherent unpredictability of medications in each individual.   Encouraged Patient to keep future appointments.   Take medications as prescribed and abstain from substance abuse.   In the event of an emergency patient was advised to go to the emergency room      Return to Clinic: 6 months    PALLAVI Montaño    Total face to face time: 36 min        *This note has been prepared using a combination of a dictation device and typing.  It has been checked for errors but some errors may still exist within the note as a result of speech recognition errors and/or typographical errors.

## 2025-04-28 ENCOUNTER — PATIENT MESSAGE (OUTPATIENT)
Dept: INTERNAL MEDICINE CLINIC | Age: 80
End: 2025-04-28

## 2025-05-09 DIAGNOSIS — M54.6 ACUTE MIDLINE THORACIC BACK PAIN: ICD-10-CM

## 2025-05-09 RX ORDER — TRAMADOL HYDROCHLORIDE 50 MG/1
50 TABLET ORAL EVERY 6 HOURS PRN
Qty: 20 TABLET | Refills: 0 | Status: SHIPPED | OUTPATIENT
Start: 2025-05-09 | End: 2025-05-14

## 2025-06-10 ENCOUNTER — OFFICE VISIT (OUTPATIENT)
Dept: NEUROLOGY | Age: 80
End: 2025-06-10
Payer: MEDICARE

## 2025-06-10 VITALS
BODY MASS INDEX: 26.09 KG/M2 | WEIGHT: 138.2 LBS | HEIGHT: 61 IN | DIASTOLIC BLOOD PRESSURE: 72 MMHG | HEART RATE: 55 BPM | SYSTOLIC BLOOD PRESSURE: 146 MMHG

## 2025-06-10 DIAGNOSIS — R55 SYNCOPE AND COLLAPSE: Primary | ICD-10-CM

## 2025-06-10 DIAGNOSIS — Z86.79 HISTORY OF SUBARACHNOID HEMORRHAGE: ICD-10-CM

## 2025-06-10 PROCEDURE — 1159F MED LIST DOCD IN RCRD: CPT | Performed by: PSYCHIATRY & NEUROLOGY

## 2025-06-10 PROCEDURE — 1160F RVW MEDS BY RX/DR IN RCRD: CPT | Performed by: PSYCHIATRY & NEUROLOGY

## 2025-06-10 PROCEDURE — 3078F DIAST BP <80 MM HG: CPT | Performed by: PSYCHIATRY & NEUROLOGY

## 2025-06-10 PROCEDURE — 1123F ACP DISCUSS/DSCN MKR DOCD: CPT | Performed by: PSYCHIATRY & NEUROLOGY

## 2025-06-10 PROCEDURE — 99214 OFFICE O/P EST MOD 30 MIN: CPT | Performed by: PSYCHIATRY & NEUROLOGY

## 2025-06-10 PROCEDURE — 3077F SYST BP >= 140 MM HG: CPT | Performed by: PSYCHIATRY & NEUROLOGY

## 2025-06-10 NOTE — PROGRESS NOTES
NEUROLOGY CONSULT  Patient Name:       Dulce Owens  :        1945  Clinic Visit Date:    6/10/2025        Dear Allan Foy MD     I had the opportunity to see your patient, Ms. Dulce Owens in neurology consultation today. As you know she  is a 80 y.o.  female with recent hosp at [facility] on 2025 for syncopal episode.  She reports no recurrent episodes of syncope since her hospitalization in early 2025. She recalls an episode of syncope while seated and watching television, during which she experienced a sensation of impending loss of consciousness, followed by vomiting and urinary incontinence. She also mentions a previous episode of syncope in , which occurred while she was ascending stairs, resulting in a backward fall and subsequent hospitalization due to a hematoma. She does not experience dizziness upon standing or walking. She continues to drive and manage her own finances without any incidents or concerns about memory loss. She resides independently, with her children living in [place] and a daughter in [place] who checks on her regularly.    Her blood pressure readings have been within the normal range, with a recent reading of 143/36. She monitors her blood pressure at home, noting that it can fluctuate, with a recent reading of 150 systolic, which decreased to 135/72 after a 5-minute interval. She acknowledges that anxiety can elevate her blood pressure. She is currently on metoprolol 25 mg twice daily, which was increased from 25 mg once daily due to a detected fast heart rate in the lower part of her heart. She has a history of myocardial infarction but no cerebrovascular accidents. She maintains regular follow-ups with her primary care physician 3 to 4 times annually and her cardiologist biannually.      Recent hospitalization on 2025:  Briefly, this is a  80 y.o. female with hx of HTN, HLD, hx of subarachnoid hemorrhage(2020) and CAD was admitted

## 2025-07-23 ENCOUNTER — OFFICE VISIT (OUTPATIENT)
Dept: INTERNAL MEDICINE CLINIC | Age: 80
End: 2025-07-23

## 2025-07-23 VITALS
BODY MASS INDEX: 25.83 KG/M2 | DIASTOLIC BLOOD PRESSURE: 72 MMHG | WEIGHT: 136.8 LBS | HEIGHT: 61 IN | SYSTOLIC BLOOD PRESSURE: 138 MMHG | HEART RATE: 56 BPM | OXYGEN SATURATION: 98 %

## 2025-07-23 DIAGNOSIS — I10 PRIMARY HYPERTENSION: Primary | ICD-10-CM

## 2025-07-23 DIAGNOSIS — I10 ESSENTIAL HYPERTENSION: ICD-10-CM

## 2025-07-23 DIAGNOSIS — I25.10 CORONARY ARTERY DISEASE INVOLVING NATIVE CORONARY ARTERY OF NATIVE HEART WITHOUT ANGINA PECTORIS: ICD-10-CM

## 2025-07-23 DIAGNOSIS — R55 SYNCOPE, UNSPECIFIED SYNCOPE TYPE: ICD-10-CM

## 2025-07-23 DIAGNOSIS — E78.2 MIXED HYPERLIPIDEMIA: ICD-10-CM

## 2025-07-23 ASSESSMENT — ENCOUNTER SYMPTOMS
CHEST TIGHTNESS: 0
BLOOD IN STOOL: 0
SORE THROAT: 0
NAUSEA: 0
COUGH: 0
SINUS PRESSURE: 0
SINUS PAIN: 0
SHORTNESS OF BREATH: 0
RHINORRHEA: 0
ABDOMINAL PAIN: 0
DIARRHEA: 0
WHEEZING: 0
CONSTIPATION: 0

## 2025-07-23 NOTE — PROGRESS NOTES
[facility]  [address] [address]  Dept: [phone]    [facility]    Progress Note    Date of patient's visit: 7/23/2025    Patient's Name:  Dulce Owens    YOB: 1945            Patient Care Team:  Allan Chavarria MD as PCP - General (Internal Medicine)  Allan Chavarria MD as PCP - Empaneled Provider  Jeremy Ken MD (Gastroenterology)  Katrina Liao MD as Referring Physician (Cardiology)  Bronson Sim MD as Surgeon (Cardiothoracic Surgery)    REASON FOR VISIT:     Chief Complaint   Patient presents with    Coronary Artery Disease     3 month fu         HISTORY OF PRESENT ILLNESS:    History was obtained from the patient. Dulce Owens is a 80 y.o. is here for    Syncope  Patient follows with cardiology, neurology  Had Holter monitor which showed: Patient monitored for 4 days and 23 hours  The primary rhythm was sinus rhythm  Average heart rate 69 bpm  Minimum heart rate 55 bpm sinus bradycardia  Maximum heart rate 118 bpm at 03:46:38  on 4/4/2025  PVC burden 2.85% total PVC 06846  1 event V. tach 9 beats per heart rate 117 bpm at 0910 p.m. on on day 4, 04/05/2025  Occasional ventricular couplets  Premature atrial ectopic activity burden 0.06% total 307 total SVE  Supraventricular arrhythmias nonsustained  No pauses seen  No atrial fibrillation  T wave abnormalities during tachycardia consider ventricular ischemia    She has not had any other episodes  Denied palpitations, lightheadedness or dizziness    Hypertension  Blood pressure controlled    Hyperlipidemia  Tolerating statin    CAD  Compliant with her medications, Lipitor, Plavix, fenofibrate, metoprolol, aspirin      Past Medical History:   Diagnosis Date    Diverticulosis of colon     Full dentures     History of colon polyps 04/22/2017    Hyperlipidemia     Hypertension     Osteoarthritis     Osteoarthritis of hand     Osteoarthritis of lower

## 2025-08-13 ENCOUNTER — HOSPITAL ENCOUNTER (OUTPATIENT)
Age: 80
Setting detail: SPECIMEN
Discharge: HOME | End: 2025-08-13

## 2025-08-13 DIAGNOSIS — I10 PRIMARY HYPERTENSION: ICD-10-CM

## 2025-08-13 DIAGNOSIS — E78.2 MIXED HYPERLIPIDEMIA: ICD-10-CM

## 2025-08-13 DIAGNOSIS — I10 ESSENTIAL HYPERTENSION: ICD-10-CM

## 2025-08-13 LAB — TSH SERPL DL<=0.05 MIU/L-ACNC: 0.5 UIU/ML (ref 0.27–4.2)

## (undated) DEVICE — FORCEPS BX L240CM JAW DIA2.2MM RAD JAW 4 HOT DISP

## (undated) DEVICE — DEFENDO AIR WATER SUCTION AND BIOPSY VALVE KIT FOR  OLYMPUS: Brand: DEFENDO AIR/WATER/SUCTION AND BIOPSY VALVE

## (undated) DEVICE — Z DISCONTINUED USE 2424143 ADAPTER O2 SWVL CHRISTMAS TREE GRN

## (undated) DEVICE — GLOVE ORANGE PI 7   MSG9070

## (undated) DEVICE — SNARE ENDOSCP L240CM LOOP W13MM DIA2.4MM SHT THROW SM OVL

## (undated) DEVICE — JELLY,LUBE,STERILE,FLIP TOP,TUBE,2-OZ: Brand: MEDLINE

## (undated) DEVICE — AIRLIFE™ OXYGEN TUBING 7 FEET (2.1 M) CRUSH RESISTANT OXYGEN TUBING, VINYL TIPPED: Brand: AIRLIFE™

## (undated) DEVICE — AIRLIFE™ NASAL OXYGEN CANNULA CURVED, FLARED TIP, WITH 7 FEET (2.1 M) CRUSH RESISTANT TUBING, OVER-THE-EAR STYLE: Brand: AIRLIFE™

## (undated) DEVICE — GOWN,POLY REINFORCED,LG: Brand: MEDLINE

## (undated) DEVICE — SYRINGE MED 50ML LUERSLIP TIP

## (undated) DEVICE — Z INACTIVE USE 2525529 CONNECTOR TBNG FOR O2

## (undated) DEVICE — CANNULA NSL AD TBNG L7FT PVC STR NONFLARED PRNG O2 DEL W STD